# Patient Record
Sex: MALE | Race: BLACK OR AFRICAN AMERICAN | Employment: OTHER | URBAN - METROPOLITAN AREA
[De-identification: names, ages, dates, MRNs, and addresses within clinical notes are randomized per-mention and may not be internally consistent; named-entity substitution may affect disease eponyms.]

---

## 2019-03-20 ENCOUNTER — APPOINTMENT (OUTPATIENT)
Dept: CT IMAGING | Age: 79
End: 2019-03-20
Payer: MEDICARE

## 2019-03-20 ENCOUNTER — HOSPITAL ENCOUNTER (EMERGENCY)
Age: 79
Discharge: HOME | End: 2019-03-20
Attending: EMERGENCY MEDICINE
Payer: MEDICARE

## 2019-03-20 ENCOUNTER — APPOINTMENT (OUTPATIENT)
Dept: GENERAL RADIOLOGY | Age: 79
End: 2019-03-20
Payer: MEDICARE

## 2019-03-20 VITALS
HEART RATE: 72 BPM | SYSTOLIC BLOOD PRESSURE: 142 MMHG | OXYGEN SATURATION: 97 % | WEIGHT: 169 LBS | BODY MASS INDEX: 21.69 KG/M2 | RESPIRATION RATE: 18 BRPM | HEIGHT: 74 IN | DIASTOLIC BLOOD PRESSURE: 88 MMHG | TEMPERATURE: 98.1 F

## 2019-03-20 DIAGNOSIS — I95.1 ORTHOSTATIC HYPOTENSION: ICD-10-CM

## 2019-03-20 DIAGNOSIS — R42 DIZZINESS: Primary | ICD-10-CM

## 2019-03-20 DIAGNOSIS — R91.8 LUNG MASS: ICD-10-CM

## 2019-03-20 LAB
ALBUMIN SERPL-MCNC: 4 G/DL (ref 3.5–5.2)
ALP BLD-CCNC: 82 U/L (ref 40–129)
ALT SERPL-CCNC: 11 U/L (ref 0–40)
ANION GAP SERPL CALCULATED.3IONS-SCNC: 9 MMOL/L (ref 7–16)
AST SERPL-CCNC: 30 U/L (ref 0–39)
BILIRUB SERPL-MCNC: 0.5 MG/DL (ref 0–1.2)
BUN BLDV-MCNC: 18 MG/DL (ref 8–23)
CALCIUM SERPL-MCNC: 9 MG/DL (ref 8.6–10.2)
CHLORIDE BLD-SCNC: 101 MMOL/L (ref 98–107)
CO2: 29 MMOL/L (ref 22–29)
CREAT SERPL-MCNC: 1 MG/DL (ref 0.7–1.2)
D DIMER: 441 NG/ML DDU
GFR AFRICAN AMERICAN: >60
GFR NON-AFRICAN AMERICAN: >60 ML/MIN/1.73
GLUCOSE BLD-MCNC: 150 MG/DL (ref 74–99)
HCT VFR BLD CALC: 43.9 % (ref 37–54)
HEMOGLOBIN: 13.8 G/DL (ref 12.5–16.5)
MCH RBC QN AUTO: 29.5 PG (ref 26–35)
MCHC RBC AUTO-ENTMCNC: 31.4 % (ref 32–34.5)
MCV RBC AUTO: 93.8 FL (ref 80–99.9)
PDW BLD-RTO: 17.6 FL (ref 11.5–15)
PLATELET # BLD: 175 E9/L (ref 130–450)
PMV BLD AUTO: 11 FL (ref 7–12)
POTASSIUM SERPL-SCNC: 3.8 MMOL/L (ref 3.5–5)
RBC # BLD: 4.68 E12/L (ref 3.8–5.8)
SODIUM BLD-SCNC: 139 MMOL/L (ref 132–146)
TOTAL PROTEIN: 7.6 G/DL (ref 6.4–8.3)
TROPONIN: <0.01 NG/ML (ref 0–0.03)
WBC # BLD: 5.6 E9/L (ref 4.5–11.5)

## 2019-03-20 PROCEDURE — 2580000003 HC RX 258: Performed by: RADIOLOGY

## 2019-03-20 PROCEDURE — 71046 X-RAY EXAM CHEST 2 VIEWS: CPT

## 2019-03-20 PROCEDURE — 80053 COMPREHEN METABOLIC PANEL: CPT

## 2019-03-20 PROCEDURE — 36415 COLL VENOUS BLD VENIPUNCTURE: CPT

## 2019-03-20 PROCEDURE — 2580000003 HC RX 258: Performed by: EMERGENCY MEDICINE

## 2019-03-20 PROCEDURE — 99284 EMERGENCY DEPT VISIT MOD MDM: CPT

## 2019-03-20 PROCEDURE — 6360000004 HC RX CONTRAST MEDICATION: Performed by: RADIOLOGY

## 2019-03-20 PROCEDURE — 85378 FIBRIN DEGRADE SEMIQUANT: CPT

## 2019-03-20 PROCEDURE — 84484 ASSAY OF TROPONIN QUANT: CPT

## 2019-03-20 PROCEDURE — 85027 COMPLETE CBC AUTOMATED: CPT

## 2019-03-20 PROCEDURE — 71275 CT ANGIOGRAPHY CHEST: CPT

## 2019-03-20 RX ORDER — SODIUM CHLORIDE 0.9 % (FLUSH) 0.9 %
10 SYRINGE (ML) INJECTION ONCE
Status: COMPLETED | OUTPATIENT
Start: 2019-03-20 | End: 2019-03-20

## 2019-03-20 RX ORDER — AMLODIPINE BESYLATE 5 MG/1
5 TABLET ORAL DAILY
Refills: 0 | COMMUNITY
Start: 2019-02-21

## 2019-03-20 RX ORDER — 0.9 % SODIUM CHLORIDE 0.9 %
1000 INTRAVENOUS SOLUTION INTRAVENOUS ONCE
Status: COMPLETED | OUTPATIENT
Start: 2019-03-20 | End: 2019-03-20

## 2019-03-20 RX ADMIN — SODIUM CHLORIDE 1000 ML: 9 INJECTION, SOLUTION INTRAVENOUS at 12:25

## 2019-03-20 RX ADMIN — IOPAMIDOL 60 ML: 755 INJECTION, SOLUTION INTRAVENOUS at 11:24

## 2019-03-20 RX ADMIN — Medication 10 ML: at 11:24

## 2019-03-26 PROBLEM — R91.8 MASS OF LINGULA OF LUNG: Status: ACTIVE | Noted: 2019-03-26

## 2019-03-26 PROBLEM — J44.9 COPD, GROUP B, BY GOLD 2017 CLASSIFICATION (HCC): Status: ACTIVE | Noted: 2019-03-26

## 2019-03-26 LAB
EKG ATRIAL RATE: 85 BPM
EKG P AXIS: 70 DEGREES
EKG P-R INTERVAL: 168 MS
EKG Q-T INTERVAL: 354 MS
EKG QRS DURATION: 70 MS
EKG QTC CALCULATION (BAZETT): 421 MS
EKG R AXIS: 79 DEGREES
EKG T AXIS: 85 DEGREES
EKG VENTRICULAR RATE: 85 BPM

## 2019-04-01 RX ORDER — SODIUM CHLORIDE 0.9 % (FLUSH) 0.9 %
10 SYRINGE (ML) INJECTION PRN
Status: CANCELLED | OUTPATIENT
Start: 2019-04-01

## 2019-04-04 ENCOUNTER — HOSPITAL ENCOUNTER (OUTPATIENT)
Dept: GENERAL RADIOLOGY | Age: 79
Discharge: HOME | End: 2019-04-06
Payer: MEDICARE

## 2019-04-04 ENCOUNTER — HOSPITAL ENCOUNTER (OUTPATIENT)
Dept: CT IMAGING | Age: 79
Setting detail: OBSERVATION
Discharge: HOME | End: 2019-04-06
Attending: INTERNAL MEDICINE | Admitting: INTERNAL MEDICINE
Payer: MEDICARE

## 2019-04-04 DIAGNOSIS — R91.8 MASS OF LINGULA OF LUNG: ICD-10-CM

## 2019-04-04 DIAGNOSIS — Z01.812 ENCOUNTER FOR PREPROCEDURAL LABORATORY EXAMINATION: ICD-10-CM

## 2019-04-04 DIAGNOSIS — J94.2 HEMOTHORAX ON LEFT: ICD-10-CM

## 2019-04-04 PROBLEM — I10 HTN (HYPERTENSION): Status: ACTIVE | Noted: 2019-04-04

## 2019-04-04 LAB
ALBUMIN SERPL-MCNC: 4.1 G/DL (ref 3.5–5.2)
ALP BLD-CCNC: 89 U/L (ref 40–129)
ALT SERPL-CCNC: 16 U/L (ref 0–40)
ANION GAP SERPL CALCULATED.3IONS-SCNC: 9 MMOL/L (ref 7–16)
APTT: 37.1 SEC (ref 24.5–35.1)
AST SERPL-CCNC: 37 U/L (ref 0–39)
BILIRUB SERPL-MCNC: 0.3 MG/DL (ref 0–1.2)
BUN BLDV-MCNC: 15 MG/DL (ref 8–23)
CALCIUM SERPL-MCNC: 9.1 MG/DL (ref 8.6–10.2)
CHLORIDE BLD-SCNC: 98 MMOL/L (ref 98–107)
CO2: 30 MMOL/L (ref 22–29)
CREAT SERPL-MCNC: 1 MG/DL (ref 0.7–1.2)
GFR AFRICAN AMERICAN: >60
GFR NON-AFRICAN AMERICAN: >60 ML/MIN/1.73
GLUCOSE BLD-MCNC: 113 MG/DL (ref 74–99)
INR BLD: 1.1
PLATELET # BLD: 176 E9/L (ref 130–450)
POTASSIUM SERPL-SCNC: 4.1 MMOL/L (ref 3.5–5)
PROTHROMBIN TIME: 12 SEC (ref 9.3–12.4)
SODIUM BLD-SCNC: 137 MMOL/L (ref 132–146)
TOTAL PROTEIN: 8.2 G/DL (ref 6.4–8.3)

## 2019-04-04 PROCEDURE — 85610 PROTHROMBIN TIME: CPT

## 2019-04-04 PROCEDURE — 7100000010 HC PHASE II RECOVERY - FIRST 15 MIN

## 2019-04-04 PROCEDURE — 36415 COLL VENOUS BLD VENIPUNCTURE: CPT

## 2019-04-04 PROCEDURE — 32405 CT NEEDLE BIOPSY LUNG PERCUTANEOUS: CPT

## 2019-04-04 PROCEDURE — 88341 IMHCHEM/IMCYTCHM EA ADD ANTB: CPT

## 2019-04-04 PROCEDURE — 88305 TISSUE EXAM BY PATHOLOGIST: CPT

## 2019-04-04 PROCEDURE — 88342 IMHCHEM/IMCYTCHM 1ST ANTB: CPT

## 2019-04-04 PROCEDURE — G0378 HOSPITAL OBSERVATION PER HR: HCPCS

## 2019-04-04 PROCEDURE — 71045 X-RAY EXAM CHEST 1 VIEW: CPT

## 2019-04-04 PROCEDURE — 85730 THROMBOPLASTIN TIME PARTIAL: CPT

## 2019-04-04 PROCEDURE — 2580000003 HC RX 258: Performed by: INTERNAL MEDICINE

## 2019-04-04 PROCEDURE — 85049 AUTOMATED PLATELET COUNT: CPT

## 2019-04-04 PROCEDURE — 77012 CT SCAN FOR NEEDLE BIOPSY: CPT

## 2019-04-04 PROCEDURE — 6360000002 HC RX W HCPCS: Performed by: RADIOLOGY

## 2019-04-04 PROCEDURE — 80053 COMPREHEN METABOLIC PANEL: CPT

## 2019-04-04 PROCEDURE — 7100000011 HC PHASE II RECOVERY - ADDTL 15 MIN

## 2019-04-04 RX ORDER — FENTANYL CITRATE 50 UG/ML
50 INJECTION, SOLUTION INTRAMUSCULAR; INTRAVENOUS ONCE
Status: COMPLETED | OUTPATIENT
Start: 2019-04-04 | End: 2019-04-04

## 2019-04-04 RX ORDER — ONDANSETRON 2 MG/ML
4 INJECTION INTRAMUSCULAR; INTRAVENOUS EVERY 6 HOURS PRN
Status: DISCONTINUED | OUTPATIENT
Start: 2019-04-04 | End: 2019-04-06 | Stop reason: HOSPADM

## 2019-04-04 RX ORDER — MIDAZOLAM HYDROCHLORIDE 1 MG/ML
1 INJECTION INTRAMUSCULAR; INTRAVENOUS ONCE
Status: COMPLETED | OUTPATIENT
Start: 2019-04-04 | End: 2019-04-04

## 2019-04-04 RX ORDER — SODIUM CHLORIDE 0.9 % (FLUSH) 0.9 %
10 SYRINGE (ML) INJECTION EVERY 12 HOURS SCHEDULED
Status: DISCONTINUED | OUTPATIENT
Start: 2019-04-04 | End: 2019-04-06 | Stop reason: HOSPADM

## 2019-04-04 RX ORDER — SODIUM CHLORIDE 9 MG/ML
INJECTION, SOLUTION INTRAVENOUS CONTINUOUS
Status: DISCONTINUED | OUTPATIENT
Start: 2019-04-04 | End: 2019-04-05

## 2019-04-04 RX ORDER — AMLODIPINE BESYLATE 5 MG/1
5 TABLET ORAL DAILY
Status: DISCONTINUED | OUTPATIENT
Start: 2019-04-05 | End: 2019-04-06 | Stop reason: HOSPADM

## 2019-04-04 RX ORDER — SODIUM CHLORIDE 0.9 % (FLUSH) 0.9 %
10 SYRINGE (ML) INJECTION PRN
Status: DISCONTINUED | OUTPATIENT
Start: 2019-04-04 | End: 2019-04-06 | Stop reason: HOSPADM

## 2019-04-04 RX ORDER — ACETAMINOPHEN 325 MG/1
650 TABLET ORAL EVERY 4 HOURS PRN
Status: DISCONTINUED | OUTPATIENT
Start: 2019-04-04 | End: 2019-04-06 | Stop reason: HOSPADM

## 2019-04-04 RX ADMIN — Medication 10 ML: at 21:27

## 2019-04-04 RX ADMIN — FENTANYL CITRATE 50 MCG: 50 INJECTION INTRAMUSCULAR; INTRAVENOUS at 12:56

## 2019-04-04 RX ADMIN — MIDAZOLAM HYDROCHLORIDE 1 MG: 1 INJECTION, SOLUTION INTRAMUSCULAR; INTRAVENOUS at 12:56

## 2019-04-04 RX ADMIN — SODIUM CHLORIDE: 9 INJECTION, SOLUTION INTRAVENOUS at 21:27

## 2019-04-04 ASSESSMENT — PAIN SCALES - GENERAL
PAINLEVEL_OUTOF10: 0
PAINLEVEL_OUTOF10: 4
PAINLEVEL_OUTOF10: 0

## 2019-04-04 NOTE — BRIEF OP NOTE
Brief Postoperative Note    Janette Torres  YOB: 1940  [id number]    Pre-operative Diagnosis: Left lung nodule    Post-operative Diagnosis: Same    Procedure: Left lung nodule biopsy    Anesthesia: Moderate Sedation    Surgeons/Assistants: Elio    Estimated Blood Loss: less than 187 mL    Complications: None    Specimens: Was Obtained: Cores    Findings: See PACS    Electronically signed by Trena Otero MD on 4/4/2019 at 1:54 PM

## 2019-04-04 NOTE — PROGRESS NOTES
1030 Patient here for left lung biopsy. Patient identified chart reviewed allergies verified. VSS. Blood work taken, sent to lab and IV placed. 46 Dr. Mariama Bernal in to speak to patient regarding procedure, risks and benefits and a physical assessment was completed  1150 Dr. Abraham Skinner called and speaking to patient via telephone per Dr. Cheko Goldberg request.  2306+ Consent signed by patient. Patient positioned supine on Cat Scan table @ 1250  with O2 and monitoring devices attached. Patient scanned and images reviewed by Dr Mariama Bernal . Time out called @ 1315. Patient prepped and draped. With the guidance of Cat Scan, needle inserted left lung and  biopsy x3 taken by . Patient coughing up bright red blood. Patient suctioned. Mask applied. Patient re-scanned and images reviewed. Patient assisted off ct table. .  Puncture site cleansed and dry dressing applied. Procedure completed @4405  Patient transported to recovery. Report given to RP RN  Biopsy sample taken to laboratory. 1410 Report received from RP RN. CXR completed  1600 2nd CXR completed and reviewed by Dr. Mariama Bernal spoke to Dr. Abraham Skinner. Decision to admit patient made. Spoke with Dr. Steffan Eisenmenger, he will admit patient under observation  Report called to [facility] on 8WE.  Transport requested
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
## 2019-04-04 NOTE — PRE SEDATION
(Versed) intravenously and fentanyl intravenously    Patient is an appropriate candidate for plan of sedation: yes    Electronically signed by Jocelyn Farris MD on 4/4/2019 at 11:36 AM

## 2019-04-04 NOTE — PROGRESS NOTES
1340 Received pt via cart to angio recovery room. O2 per non rebreather in use  1350 Dr Linda Kevin visits  [id number] 1914 O2 per simple mask.  Pt with moist non productive cough

## 2019-04-04 NOTE — POST SEDATION
Sedation Post Procedure Note    Patient Name: Conner Herr   YOB: 1940  Room/Bed: Room/bed info not found  Medical Record Number: [id number]  Date: 4/4/2019   Time: 2:04 PM         Physicians/Assistants: Jocelyn Farris MD    Procedure Performed:  Left lung nodule biopsy    Post-Sedation Vital Signs:  Vitals:    04/04/19 1355   BP: (!) 154/73   Pulse: 67   Resp: 22   Temp:    SpO2: 97%               Post-Sedation Exam: Hemoptysis. Otherwise stable. Complications: None. Pt. denies SOB and chest pain.      Electronically signed by Jocelyn Farris MD on 4/4/2019 at 2:04 PM

## 2019-04-05 ENCOUNTER — APPOINTMENT (OUTPATIENT)
Dept: GENERAL RADIOLOGY | Age: 79
End: 2019-04-05
Attending: INTERNAL MEDICINE
Payer: MEDICARE

## 2019-04-05 LAB
ANION GAP SERPL CALCULATED.3IONS-SCNC: 8 MMOL/L (ref 7–16)
BUN BLDV-MCNC: 19 MG/DL (ref 8–23)
CALCIUM SERPL-MCNC: 8.7 MG/DL (ref 8.6–10.2)
CHLORIDE BLD-SCNC: 101 MMOL/L (ref 98–107)
CO2: 28 MMOL/L (ref 22–29)
CREAT SERPL-MCNC: 1 MG/DL (ref 0.7–1.2)
GFR AFRICAN AMERICAN: >60
GFR NON-AFRICAN AMERICAN: >60 ML/MIN/1.73
GLUCOSE BLD-MCNC: 102 MG/DL (ref 74–99)
HCT VFR BLD CALC: 39.3 % (ref 37–54)
HEMOGLOBIN: 12.7 G/DL (ref 12.5–16.5)
MCH RBC QN AUTO: 30.3 PG (ref 26–35)
MCHC RBC AUTO-ENTMCNC: 32.3 % (ref 32–34.5)
MCV RBC AUTO: 93.8 FL (ref 80–99.9)
PDW BLD-RTO: 17.2 FL (ref 11.5–15)
PLATELET # BLD: 153 E9/L (ref 130–450)
PMV BLD AUTO: 10.8 FL (ref 7–12)
POTASSIUM REFLEX MAGNESIUM: 4.4 MMOL/L (ref 3.5–5)
RBC # BLD: 4.19 E12/L (ref 3.8–5.8)
SODIUM BLD-SCNC: 137 MMOL/L (ref 132–146)
WBC # BLD: 8.9 E9/L (ref 4.5–11.5)

## 2019-04-05 PROCEDURE — 96372 THER/PROPH/DIAG INJ SC/IM: CPT

## 2019-04-05 PROCEDURE — 80048 BASIC METABOLIC PNL TOTAL CA: CPT

## 2019-04-05 PROCEDURE — 85027 COMPLETE CBC AUTOMATED: CPT

## 2019-04-05 PROCEDURE — 2580000003 HC RX 258: Performed by: INTERNAL MEDICINE

## 2019-04-05 PROCEDURE — 71045 X-RAY EXAM CHEST 1 VIEW: CPT

## 2019-04-05 PROCEDURE — 36415 COLL VENOUS BLD VENIPUNCTURE: CPT

## 2019-04-05 PROCEDURE — G0378 HOSPITAL OBSERVATION PER HR: HCPCS

## 2019-04-05 PROCEDURE — 6360000002 HC RX W HCPCS: Performed by: INTERNAL MEDICINE

## 2019-04-05 RX ADMIN — SODIUM CHLORIDE: 9 INJECTION, SOLUTION INTRAVENOUS at 13:46

## 2019-04-05 RX ADMIN — ENOXAPARIN SODIUM 40 MG: 40 INJECTION, SOLUTION INTRAVENOUS; SUBCUTANEOUS at 12:43

## 2019-04-05 RX ADMIN — Medication 10 ML: at 21:09

## 2019-04-05 RX ADMIN — Medication 10 ML: at 12:43

## 2019-04-05 ASSESSMENT — PAIN SCALES - GENERAL: PAINLEVEL_OUTOF10: 0

## 2019-04-05 NOTE — PLAN OF CARE
Patient knows to ask for help when getting up to restroom and to keep hospital socks on. Will continue to monitor patient's skin integrity. Problem: Risk for Impaired Skin Integrity  Goal: Tissue integrity - skin and mucous membranes  Description  Structural intactness and normal physiological function of skin and  mucous membranes.   4/5/2019 1325 by Edu Yang RN  Outcome: Met This Shift     Problem: Falls - Risk of:  Goal: Will remain free from falls  Description  Will remain free from falls  4/5/2019 1325 by Edu Yang RN  Outcome: Met This Shift     Problem: Falls - Risk of:  Goal: Absence of physical injury  Description  Absence of physical injury  4/5/2019 1325 by Edu Yang RN  Outcome: Met This Shift

## 2019-04-05 NOTE — PROGRESS NOTES
Patient is willing to have home oxygen now after speaking with him numerous times today and social work spoke with him as well.

## 2019-04-05 NOTE — PROGRESS NOTES
Follow up from IR regarding lung biopsy completed on 4/4. Per chart, patient is still on 4 L nasal cannula. Pulm consulted and needs to see. Spoke with nurse today, no apparent issues relayed. CXR ordered and needs to be done. Please call ext [phone] with any questions.

## 2019-04-05 NOTE — H&P
cyanosis or edema  Pulses: 2+ and symmetric  Skin: Skin color, texture, turgor normal. No rashes or lesions  Neurologic: Grossly normal    Results      Component Value Units   XR CHEST PORTABLE [[id number]] Resulted: 19 3467   Updated: 19 0907    Narrative:     Patient MRN:  [id number]  : 1940  Age: [de-identified] years  Gender: Male    Order Date:  2019 7:45 AM    Exam: XR CHEST PORTABLE    Number of views: Portable upright AP view of the chest, 1 image(s)    Indication: Status post biopsy of left lingular lung mass. Comparison: Radiographs dated 2019 and 3/20/2019. Procedural CT  images dated 2019. Chest CT dated 3/20/2019. FINDINGS:    There is improving interstitial and airspace opacification in the  lingular region of the left lung, compatible with postbiopsy  hemorrhage, as seen on the postbiopsy CT images from 2019 and  subsequently followed radiographically the same day. The relatively  dense residual focal opacity in this region appears to be due to the  underlying lung mass itself, which can be appreciated by comparison  with the chest radiographs of 3/20/2019 and coronal CT reconstructions  of 3/20/2019. There is no apparent pneumothorax or radiographically  significant hemothorax. No pleural effusion is seen. The cardiac  silhouette is unremarkable. Impression:       Improving postbiopsy opacities in the left lung. Left lingular lung mass. No pneumothorax. Basic Metabolic Panel w/ Reflex to MG [[id number]] (Abnormal) Collected: 19 0649   Updated: 19 0836     Sodium 137 mmol/L    Potassium reflex Magnesium 4.4 mmol/L    Chloride 101 mmol/L    CO2 28 mmol/L    Anion Gap 8 mmol/L    Glucose 102High  mg/dL    BUN 19 mg/dL    CREATININE 1.0 mg/dL    GFR Non-African American >60 mL/min/1.73    Comment: Chronic Kidney Disease: less than 60 ml/min/1.73 sq. m.         Kidney Failure: less than 15 ml/min/1.73 sq.m. Results valid for patients 18 years and older. GFR African American >60    Calcium 8.7 mg/dL   CBC [[id number]] (Abnormal) Collected: 19 0649   Updated: 1912     WBC 8.9 E9/L    RBC 4.19 E12/L    Hemoglobin 12.7 g/dL    Hematocrit 39.3 %    MCV 93.8 fL    MCH 30.3 pg    MCHC 32.3 %    RDW 17.2High  fL    Platelets 312 O9/T    MPV 10.8 fL   Surgical Pathology [[id number]] Resulted: 19 0807   Updated: 19    XR CHEST PORTABLE [[id number]] Resulted: 19 1619   Updated: 19 1621    Narrative:     Patient MRN:  [id number]  : 1940  Age: [de-identified] years  Gender: Male    Order Date:  2019 4:00 PM    Exam: XR CHEST PORTABLE    Number of views: Portable upright AP view of the chest, 1 image(s)    Indication: Post left lung nodule biopsy. Comparison: Chest radiograph dated 2019 1406 hours. FINDINGS:    Seen again is increased density in the lingula, compatible with  postbiopsy hemorrhage and the presence of a pulmonary nodule, without  significant change in appearance from the prior study. No pneumothorax  is seen. There is no pleural effusion. The cardiomediastinal  silhouette is unremarkable. Impression:       Stable lingular density, compatible with postbiopsy hemorrhage and  underlying lung nodule. No pneumothorax. Findings discussed with Dr. Gloria Nova at approximately 33 64 74 hours on  2019. CT NEEDLE BIOPSY LUNG PERCUTANEOUS [[id number]] Resulted: 19 1517   Updated: 19 151    Narrative:     Patient MRN:  [id number]  : 1940  Age: [de-identified] years  Gender: Male    Order Date:  2019 10:43 AM    Exam: CT GUIDED NEEDLE PLACEMENT, CT NEEDLE BIOPSY LUNG PERCUTANEOUS    Procedure: CT-guided core biopsy of the left lung nodule    Number of Images:  109    Indication: R91.8 Mass of lingula of lung   left lung mass     Comparison: CT study dated 3/20/2019. Anesthesia: Moderate sedation. Medications: Midazolam 1 mg IV, fentanyl 50 mcg IV. Lidocaine (2%)  subcutaneous.     Contrast: None.    Intraprocedural time: 30 minutes    PROCEDURE DETAILS AND FINDINGS:   Informed consent for the procedure was obtained from the patient. The  details of the procedure and its risks, benefits, and alternatives  were explained. These risks include, but are not limited to,  pneumothorax (possibly requiring chest tube insertion), bleeding,  hemoptysis, and infection. The patient was given the opportunity to  ask questions, which were answered, and indicated understanding. The  consent form was signed and witnessed. The procedure was performed under moderate sedation. The patient's  vital signs were visually displayed, and the patient was continuously  monitored throughout the procedure. Axial CT images of the chest  were obtained, again demonstrating a  spiculated nodule in the lingula of the left lung. Based on the CT  images, a biopsy trajectory was planned, and the appropriate site was  marked on the skin. The left chest  was sterilely prepped and draped. A preprocedure  timeout was performed. The intended percutaneous entry site was  locally anesthetized with subcutaneously administered 2% lidocaine,  and a small incision was made in the skin. Under CT guidance, a  17-gauge coaxial needle guide was advanced into the lung nodule. Core  biopsy of the lesion was performed through the coaxial needle guide  with a VIOSOince 18-gauge biopsy needle. The core specimens were  immediately placed in formalin solution for submission to the  pathology lab. To ensure hemostasis, Gelfoam mixed with sterile normal  saline was injected through the coaxial needle guide as the needle  guide was removed. A sterile, occlusive dressing was applied. A  postprocedure CT scan of the chest was performed, demonstrating  postbiopsy changes, including lingular airspace opacification  secondary to hemorrhage, and no pneumothorax. Impression:       CT-guided core biopsy of a left lung nodule, as described.    CT GUIDED NEEDLE 18-gauge biopsy needle. The core specimens were  immediately placed in formalin solution for submission to the  pathology lab. To ensure hemostasis, Gelfoam mixed with sterile normal  saline was injected through the coaxial needle guide as the needle  guide was removed. A sterile, occlusive dressing was applied. A  postprocedure CT scan of the chest was performed, demonstrating  postbiopsy changes, including lingular airspace opacification  secondary to hemorrhage, and no pneumothorax. Impression:       CT-guided core biopsy of a left lung nodule, as described. XR CHEST PORTABLE [[id number]] Resulted: 19 1436   Updated: 19    Narrative:     Patient MRN:  [id number]  : 1940  Age: [de-identified] years  Gender: Male    Order Date:  2019 1:45 PM    Exam: XR CHEST PORTABLE    Number of views: Portable upright AP view of the chest, 1 image(s)    Indication: Post lung biopsy. Comparison: Procedural CT images dated 2019. Chest radiographs  dated 3/20/2019. FINDINGS:    The cardiomediastinal silhouette is unremarkable. There is increased lingular density, compatible with pulmonary  hemorrhage, as seen on the postbiopsy CT images, and the presence of a  pulmonary nodule seen on prior studies. No pleural effusion or  pneumothorax is seen. Impression:       Lingular density, compatible with postbiopsy pulmonary hemorrhage. No  pneumothorax. Platelet count [[id number]] Collected: 19   Updated: 19 1201     Platelets 346 E7/M   Comprehensive Metabolic Panel [[id number]] (Abnormal) Collected: 19 104   Updated: 19 1128    Specimen Source: Blood     Sodium 137 mmol/L    Potassium 4.1 mmol/L    Comment: Specimen is moderately Hemolyzed. Result may be artificially increased.        Chloride 98 mmol/L    CO2 30High  mmol/L    Anion Gap 9 mmol/L    Glucose 113High  mg/dL    BUN 15 mg/dL    CREATININE 1.0 mg/dL    GFR Non-African American >60 mL/min/1.73    Comment: Chronic Kidney Disease: less than 60 ml/min/1.73 sq. m.         Kidney Failure: less than 15 ml/min/1.73 sq.m. Results valid for patients 18 years and older. GFR  >60    Calcium 9.1 mg/dL    Total Protein 8.2 g/dL    Alb 4.1 g/dL    Total Bilirubin 0.3 mg/dL    Alkaline Phosphatase 89 U/L    ALT 16 U/L    AST 37 U/L    Comment: Specimen is moderately Hemolyzed. Result may be artificially increased. APTT [[id number]] (Abnormal) Collected: 04/04/19 1045   Updated: 04/04/19 1116     aPTT 37.1High  sec   Protime-INR [[id number]] Collected: 04/04/19 1045   Updated: 04/04/19 1114     Protime 12.0 sec    INR 1.1           Problem list:    Patient Active Problem List   Diagnosis    Mass of lingula of lung    COPD, group B, by GOLD 2017 classification ([facility] [address]    Hemothorax on left    HTN (hypertension)         ASSESSMENT:      1. Traumatic left hemothorax secondary to lung biopsy with hypoxia    2. Mass of lingula of lung status post biopsy, path pending    3. COPD, group B, by Gold 2017 classification    4. Essential hypertension    5. History of prostate cancer 2009    PLAN:     1. Continue oxygen supplementation    2. Discussed with radiology, actually improving    3. Pulmonary consultation pending    4.  Check pulse oximetry on room air with ambulation to assess for home oxygen    Martina Hawkins D.O.  10:06 AM  4/5/2019

## 2019-04-05 NOTE — PROGRESS NOTES
Physical Therapy    Facility/Department: [facility] MED SURG ONC      NAME: Tonya Mcburney  : 1940  MRN: [id number]     PT order received. Evaluation attempted but pt refused adamantly. Will hold and check later.         Priscila Sorensen PT, DPT [id number]
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
## 2019-04-05 NOTE — CARE COORDINATION
Social work made aware patient needs home O2 per pulse ox testing. Will need DME order. Social work went to discuss with patient. Patient was agitated. Social work provided patient with DME list.  Patient not agreeable to O2 at home. He will call his sister to discuss. SW/MAMTA can follow up if patient agrees.   Electronically signed by WENDY Jacobson on 4/5/2019 at 3:59 PM

## 2019-04-05 NOTE — CARE COORDINATION
Social work went to meet with patient to discuss discharge planning and transition of care. Patient was agitated. Patient is independent. If needs Home O2, needs DME order and pulse ox testing.     Electronically signed by WENDY Ch on 4/5/2019 at 1:56 PM

## 2019-04-06 VITALS
TEMPERATURE: 98.5 F | DIASTOLIC BLOOD PRESSURE: 73 MMHG | OXYGEN SATURATION: 94 % | RESPIRATION RATE: 16 BRPM | SYSTOLIC BLOOD PRESSURE: 136 MMHG | HEART RATE: 86 BPM

## 2019-04-06 PROCEDURE — 6370000000 HC RX 637 (ALT 250 FOR IP): Performed by: INTERNAL MEDICINE

## 2019-04-06 PROCEDURE — 6360000002 HC RX W HCPCS: Performed by: INTERNAL MEDICINE

## 2019-04-06 PROCEDURE — 96372 THER/PROPH/DIAG INJ SC/IM: CPT

## 2019-04-06 PROCEDURE — G0378 HOSPITAL OBSERVATION PER HR: HCPCS

## 2019-04-06 PROCEDURE — 2700000000 HC OXYGEN THERAPY PER DAY

## 2019-04-06 PROCEDURE — 2580000003 HC RX 258: Performed by: INTERNAL MEDICINE

## 2019-04-06 RX ADMIN — Medication 10 ML: at 09:31

## 2019-04-06 RX ADMIN — AMLODIPINE BESYLATE 5 MG: 5 TABLET ORAL at 09:30

## 2019-04-06 RX ADMIN — ENOXAPARIN SODIUM 40 MG: 40 INJECTION, SOLUTION INTRAVENOUS; SUBCUTANEOUS at 09:30

## 2019-04-06 ASSESSMENT — PAIN SCALES - GENERAL
PAINLEVEL_OUTOF10: 0

## 2019-04-06 NOTE — PROGRESS NOTES
PULSE OX ON ROOM AIR SITTING 75%   PULSE OX ON ROOM AIR AMBULATING 80%  PULSE OX ON 4 LITERS SITTING RECOVERY 92%  PULSE OX ON 4 LITERS AMBULATING RECOVERY 93%

## 2019-04-06 NOTE — DISCHARGE SUMMARY
Physician Discharge Summary     Patient ID:  Conner Herr  [id number]  86 y.o.  1940    Admit date: 4/4/2019    Discharge date and time: 4/6/2019  6:56 PM     Admission Diagnoses: Hemothorax on left [J94.2]    Discharge Diagnoses:        1. Traumatic left hemothorax secondary to lung biopsy with hypoxia     2. Mass of lingula of lung status post biopsy, path pending     3. COPD, group B, by Gold 2017 classification     4. Essential hypertension     5. History of prostate cancer 2009        Consults: pulmonary/intensive care    Procedures:     Jocelyn Farris MD   Physician   Radiology   Brief Op Note   Signed   Date of Service:  4/4/2019  1:54 PM               Signed                 Show:Clear all  [x]Manual[x]Template[]Copied    Added by:  [x]Chong Suazo Ace, MD      []Moi for details      Brief Postoperative Note     Conner Herr  YOB: 1940  [id number]     Pre-operative Diagnosis: Left lung nodule     Post-operative Diagnosis: Same     Procedure: Left lung nodule biopsy     Anesthesia: Moderate Sedation     Surgeons/Assistants: Elio     Estimated Blood Loss: less than 639 mL     Complications: None     Specimens: Was Obtained: Cores     Findings: See PACS     Electronically signed by Jocelyn Farris MD on 4/4/2019 at 1:54 PM                      Laboratory:   Last 3 BMP  Recent Labs     04/04/19  1045 04/05/19  0649    137   K 4.1 4.4   CL 98 101   CO2 30* 28   BUN 15 19   CREATININE 1.0 1.0   GLUCOSE 113* 102*   CALCIUM 9.1 8.7       Last 3 CBC:  Recent Labs     04/04/19  1045 04/05/19  0649   WBC  --  8.9   RBC  --  4.19   HGB  --  12.7   HCT  --  39.3   MCV  --  93.8   MCH  --  30.3   MCHC  --  32.3   RDW  --  17.2*    153   MPV  --  10.8           Hospital Course: The patient is a [de-identified] y.o. male patient of Dr Mustapha Christian who presents with cough and shortness of breath after lung biopsy. He originally presented to the emergency room on March 20 with dizziness.  In the

## 2019-04-06 NOTE — PROGRESS NOTES
Upon assessment patient had nasal cannula off. Pt was satting low 70's. Pt stated that somebody took him off of oxygen. Pt education provided and patient placed on Nasal cannula. Pt returned to 92%. Pt maintained nasal cannula in place.

## 2019-04-06 NOTE — PROGRESS NOTES
Amira Gonzalez M.D.,[facility]  Azul Real D.O., F.A.C.O.I., Coco Ortega M.D. Gracy Boast, M.D., Chuck Beavers M.D. Daily Pulmonary Progress Note    Patient:  Alexia Dash [de-identified] y.o. male MRN: [id number]     Date of Service: 4/6/2019      Synopsis     We are following patient for post left lingula node biopsy and hypoxia    \"CC\" hemoptysis    Code status: Full code      Subjective      Patient was seen and examined. He is laying in bed in no acute distress. He has had no further hemoptysis this morning. Has slight discomfort at the site of his biopsy which is improving. He is currently on 4 L of oxygen saturating 94%. .      Review of Systems:  Constitutional: Denies fever, weight loss, night sweats, and fatigue  Skin: Denies pigmentation, dark lesions, and rashes   HEENT: Denies hearing loss, tinnitus, ear drainage, epistaxis, sore throat, and hoarseness. Cardiovascular: Denies palpitations, chest pain, and chest pressure. Respiratory: Denies cough, dyspnea at rest, hemoptysis, apnea, and choking.   Gastrointestinal: Denies nausea, vomiting, poor appetite, diarrhea, heartburn or reflux  Genitourinary: Denies dysuria, frequency, urgency or hematuria  Musculoskeletal: Denies myalgias, muscle weakness, and bone pain  Neurological: Denies dizziness, vertigo, headache, and focal weakness  Psychological: Denies anxiety and depression  Endocrine: Denies heat intolerance and cold intolerance  Hematopoietic/Lymphatic: Denies bleeding problems and blood transfusions    24-hour events:  No acute events overnight    Objective   Vitals: /73   Pulse 86   Temp 98.5 °F (36.9 °C) (Temporal)   Resp 16   SpO2 94%     I/O:    Intake/Output Summary (Last 24 hours) at 4/6/2019 1548  Last data filed at 4/6/2019 1421  Gross per 24 hour   Intake 905 ml   Output 550 ml   Net 355 ml       Vent Information  FiO2 : 15 %                Physical Exam:  General Appearance: appears comfortable in no acute distress. HEENT: Normocephalic atraumatic without obvious abnormality   Neck: Lips, mucosa, and tongue normal.  Supple, symmetrical, trachea midline, no adenopathy;thyroid:  no enlargement/tenderness/nodules or JVD. Lung: Breath sounds CTA. Respirations   unlabored. Symmetrical expansion. Heart: RRR, normal S1, S2. No MRG  Abdomen: Soft, NT, ND. BS present x 4 quadrants. No bruit or organomegaly. Extremities: Pedal pulses 2+ symmetric b/l. Extremities normal, no cyanosis, clubbing, or edema. Musculokeletal: No joint swelling, no muscle tenderness. ROM normal in all joints of extremities. Neurologic: Mental status: Alert and Oriented X3 . Pertinent/ New Labs and Imaging Studies     Imaging Personally Reviewed:        Labs:  Lab Results   Component Value Date    WBC 8.9 04/05/2019    HGB 12.7 04/05/2019    HCT 39.3 04/05/2019    MCV 93.8 04/05/2019    MCH 30.3 04/05/2019    MCHC 32.3 04/05/2019    RDW 17.2 04/05/2019     04/05/2019    MPV 10.8 04/05/2019     Lab Results   Component Value Date     04/05/2019    K 4.4 04/05/2019     04/05/2019    CO2 28 04/05/2019    BUN 19 04/05/2019    CREATININE 1.0 04/05/2019    LABALBU 4.1 04/04/2019    CALCIUM 8.7 04/05/2019    GFRAA >60 04/05/2019    LABGLOM >60 04/05/2019     Lab Results   Component Value Date    PROTIME 12.0 04/04/2019    INR 1.1 04/04/2019     No results for input(s): PROBNP in the last 72 hours. No results for input(s): PROCAL in the last 72 hours. This SmartLink has not been configured with any valid records. Micro:  No results for input(s): CULTRESP in the last 72 hours. No results for input(s): LABGRAM in the last 72 hours. No results for input(s): LEGUR in the last 72 hours. No results for input(s): STREPNEUMAGU in the last 72 hours. No results for input(s): LP1UAG in the last 72 hours. Assessment:    1. Mass of the left lingula lung status post biopsy 4/4/19  2.  Hemoptysis- resolved  3. COPD  4. Active tobacco abuse, 55-pack-year smoker  5. Unintentional weight loss  6. Previous occupational exposures, including asbestos  7. Hx prostate cancer treated with radiation therapy          Plan:   1. Discussed with our nursing staff and we repeated his ambulatory pulse oximetry. Patient will require 4 L of supplemental oxygen at rest and with ambulation. 2. He can be discharged from a pulmonary standpoint  3. Follow-up in office with Dr. Jeffrey Suazo for discussion of his biopsy results on 4/23/19.       This plan of care was reviewed in collaboration with    Electronically signed by Judd Núñez MD on 4/6/2019 at 3:48 PM

## 2019-04-06 NOTE — CARE COORDINATION
Social work made aware patient decided he would like O2 at d/c.  DME order and pulse ox test noted. Patient had list for DME from yesterday but unsure what agency. SW made referral to [facility] with [facility] ([phone]).   Electronically signed by WENDY Lopez on 4/6/2019 at 12:32 PM

## 2019-04-23 ENCOUNTER — HOSPITAL ENCOUNTER (OUTPATIENT)
Dept: NUCLEAR MEDICINE | Age: 79
Discharge: HOME | End: 2019-04-25
Payer: MEDICARE

## 2019-04-23 DIAGNOSIS — C34.92 MALIGNANT NEOPLASM OF LEFT LUNG, UNSPECIFIED PART OF LUNG (HCC): ICD-10-CM

## 2019-04-23 DIAGNOSIS — C34.12 MALIGNANT NEOPLASM OF UPPER LOBE, LEFT BRONCHUS OR LUNG (HCC): ICD-10-CM

## 2019-04-23 PROCEDURE — 78815 PET IMAGE W/CT SKULL-THIGH: CPT

## 2019-04-23 PROCEDURE — 3430000000 HC RX DIAGNOSTIC RADIOPHARMACEUTICAL: Performed by: RADIOLOGY

## 2019-04-23 PROCEDURE — A9552 F18 FDG: HCPCS | Performed by: RADIOLOGY

## 2019-04-23 RX ORDER — FLUDEOXYGLUCOSE F 18 200 MCI/ML
10 INJECTION, SOLUTION INTRAVENOUS
Status: COMPLETED | OUTPATIENT
Start: 2019-04-23 | End: 2019-04-23

## 2019-04-23 RX ADMIN — FLUDEOXYGLUCOSE F 18 10 MILLICURIE: 200 INJECTION, SOLUTION INTRAVENOUS at 09:34

## 2019-05-13 PROBLEM — C34.12 MALIGNANT NEOPLASM OF UPPER LOBE OF LEFT LUNG (HCC): Status: ACTIVE | Noted: 2019-05-13

## 2019-05-28 ENCOUNTER — INITIAL CONSULT (OUTPATIENT)
Dept: CARDIOTHORACIC SURGERY | Age: 79
End: 2019-05-28
Payer: MEDICARE

## 2019-05-28 VITALS
DIASTOLIC BLOOD PRESSURE: 86 MMHG | HEIGHT: 73 IN | BODY MASS INDEX: 19.88 KG/M2 | SYSTOLIC BLOOD PRESSURE: 143 MMHG | WEIGHT: 150 LBS | HEART RATE: 81 BPM

## 2019-05-28 DIAGNOSIS — C34.12 MALIGNANT NEOPLASM OF UPPER LOBE OF LEFT LUNG (HCC): Primary | ICD-10-CM

## 2019-05-28 PROCEDURE — 4004F PT TOBACCO SCREEN RCVD TLK: CPT | Performed by: THORACIC SURGERY (CARDIOTHORACIC VASCULAR SURGERY)

## 2019-05-28 PROCEDURE — 99204 OFFICE O/P NEW MOD 45 MIN: CPT | Performed by: THORACIC SURGERY (CARDIOTHORACIC VASCULAR SURGERY)

## 2019-05-28 PROCEDURE — G8427 DOCREV CUR MEDS BY ELIG CLIN: HCPCS | Performed by: THORACIC SURGERY (CARDIOTHORACIC VASCULAR SURGERY)

## 2019-05-28 PROCEDURE — G8420 CALC BMI NORM PARAMETERS: HCPCS | Performed by: THORACIC SURGERY (CARDIOTHORACIC VASCULAR SURGERY)

## 2019-05-28 PROCEDURE — 4040F PNEUMOC VAC/ADMIN/RCVD: CPT | Performed by: THORACIC SURGERY (CARDIOTHORACIC VASCULAR SURGERY)

## 2019-05-28 PROCEDURE — 1123F ACP DISCUSS/DSCN MKR DOCD: CPT | Performed by: THORACIC SURGERY (CARDIOTHORACIC VASCULAR SURGERY)

## 2019-05-28 NOTE — PROGRESS NOTES
Subjective:      Chief Complaint   Patient presents with    Consultation     referral Dr Antonia Schmidt lung mass       Patient ID: Damian East is a [de-identified] y.o. male who presents to office upon referral for continued evaluation and recommendations regarding his MICHELA lung CA. Patient states he had a routine CXR done which showed a suspected left lung mass, he then underwent CT can which showed a 3cm  Peripheral left lung mass. 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 PET scan on 4/23/19 showed increased activity in the MICHELA as well as left hilum  He does admit to intermittent cough which at times is productive of clear sputum. He denies SOB at rest but does admit to JACKSON with mild activity. He denies wheezing, fevers, chills, night sweats, hemoptysis, CP. The patient currently smokes one packs of cigarettes per day, and  denied having set a quit date. . 55 pkyr history. HPI    Review of Systems   Constitutional: Positive for unexpected weight change. Negative for chills and fever. Respiratory: Positive for cough and shortness of breath. Negative for wheezing and stridor. Cardiovascular: Negative for chest pain and palpitations. Objective:   Physical Exam   Constitutional: He is oriented to person, place, and time. He appears well-developed and well-nourished. Cardiovascular: Normal rate, regular rhythm and normal heart sounds. Pulmonary/Chest: Effort normal and breath sounds normal.   Abdominal: Soft. Bowel sounds are normal.   Musculoskeletal: Normal range of motion. Neurological: He is alert and oriented to person, place, and time. Assessment:      Lung CA MICHELA, positive hilum      Plan:      His lung function is quite poor and he states he gets SOB with mild to moderate activity. I do not feel that taking half his left lung with a positive hilum is in his best interest.  I feel he should be evaluated for SBRT and chemo.

## 2019-05-29 ASSESSMENT — ENCOUNTER SYMPTOMS
SHORTNESS OF BREATH: 1
COUGH: 1
WHEEZING: 0
STRIDOR: 0

## 2019-06-10 ENCOUNTER — HOSPITAL ENCOUNTER (OUTPATIENT)
Dept: RADIATION ONCOLOGY | Age: 79
Discharge: HOME | End: 2019-06-10
Payer: MEDICARE

## 2019-06-10 ENCOUNTER — TELEPHONE (OUTPATIENT)
Dept: CASE MANAGEMENT | Age: 79
End: 2019-06-10

## 2019-06-10 DIAGNOSIS — C34.92 MALIGNANT NEOPLASM OF LEFT LUNG, UNSPECIFIED PART OF LUNG (HCC): Primary | ICD-10-CM

## 2019-06-10 PROCEDURE — 99205 OFFICE O/P NEW HI 60 MIN: CPT

## 2019-06-10 PROCEDURE — 99205 OFFICE O/P NEW HI 60 MIN: CPT | Performed by: RADIOLOGY
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
## 2019-06-10 NOTE — PATIENT INSTRUCTIONS
LAYTON Parish.  Martir Nunes MD [address] [place] [address]  Radiation Oncology  [place]:  [phone]   FAX:    [phone] [facility] Street: [address] [place] [address] Road:  [phone]
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
## 2019-06-10 NOTE — TELEPHONE ENCOUNTER
Met with patient and sister during his  initial consultation with Dr. Osbaldo Agudelo  for his  recent Lung cancer diagnosis. Introduced myself and explained my role with patients receiving treatment at our center. Patient was friendly and receptive. He states he is doing ok. He denies any questions or needs at this time. He does have his sister to help with things and a couple close friends that help. He states he is on SSI and Disability and has limited income. Given information on Financial Navigator. Will forward to [facility]. Instructed in detail on his pathology findings including cancer type. Instructed on next steps including potential radiation treatments per Dr. Contreras Fan recommendations and follow up care. Provided with  literature Patient Resource Lung Cancer, Patient Point-Non-Small Cell Lung Cancer and Lung Cancer Nutrition Tips. Provided with my contact information and instructed patient to call me with questions or concerns. Verbalizes understanding. Patient appreciative of visit. Will continue to follow.

## 2019-06-11 ENCOUNTER — TELEPHONE (OUTPATIENT)
Dept: CASE MANAGEMENT | Age: 79
End: 2019-06-11

## 2019-06-17 ENCOUNTER — TELEPHONE (OUTPATIENT)
Dept: RADIATION ONCOLOGY | Age: 79
End: 2019-06-17

## 2019-06-17 NOTE — TELEPHONE ENCOUNTER
SW received return call from pt today who states that his greatest concern is his financial issues that he is having. Notes reviewed which indicate financial navigator did reach out to pt, but he was not receptive at that time. He states that he needs help with his hospital bills otherwise he will not be able to pay them. He states that he contacted the number for assistance but was told he would need to obtain a copy of his SS statement which he did have at one point, but gave it to [facility] when he was applying for assistance there. He would like to speak with navigator more to determine if there are other documents he can provide including bank statements. SW will forward request to financial navigator. Pt states that he has a close relationship with his sister who has been helping him as needed and he is anxious to start radiation treatment. SW discussed role and scope of practice and pt is receptive to speaking further as needed. He will retain SW contact information for follow up.  Howard Berg, CIERA, ELVIA-S  Oncology Social Worker

## 2019-06-19 ENCOUNTER — TELEPHONE (OUTPATIENT)
Dept: INFUSION THERAPY | Age: 79
End: 2019-06-19

## 2019-06-19 ENCOUNTER — HOSPITAL ENCOUNTER (OUTPATIENT)
Dept: RADIATION ONCOLOGY | Age: 79
Discharge: HOME | End: 2019-06-19
Attending: RADIOLOGY
Payer: MEDICARE

## 2019-06-19 ENCOUNTER — TELEPHONE (OUTPATIENT)
Dept: RADIATION ONCOLOGY | Age: 79
End: 2019-06-19

## 2019-06-19 PROCEDURE — 77334 RADIATION TREATMENT AID(S): CPT | Performed by: RADIOLOGY

## 2019-06-19 NOTE — TELEPHONE ENCOUNTER
Attempted to contact patient per referral, re: weight loss. Patient unavailable, voicemail left, encouraged return call as able.  Lenny Dandy, RD,,lD

## 2019-06-19 NOTE — TELEPHONE ENCOUNTER
Augusta Payton  6/19/2019  Wt Readings from Last 10 Encounters:   05/28/19 150 lb (68 kg)   05/13/19 150 lb (68 kg)   03/26/19 152 lb (68.9 kg)   03/20/19 169 lb (76.7 kg)   05/01/12 169 lb (76.7 kg)     Ht Readings from Last 1 Encounters:   05/28/19 6' 1\" (1.854 m)     There is no height or weight on file to calculate BMI. This clinician met with patient today prior to CT Sim. He is here with his sister. He reports he is doing fair. He has noted weight loss of about 15# in the past 3mo. He reports his hunger/appetite is not as strong and he can easily skip meals. He also does not cook for himself, so he relies on family to make extra food. He does do easy foods, but when he is not as hungry, this is not as important to him. He denies n/v/d/c. He is not drinking a protein supplement because it is too expensive. Spoke with patient about the goal for weight maintenance during radiation, and the need for his body to get the proper calories, protein and vitamins for strength and healing. Reviewed the importance of a supplement to take between meals to aid in this, and provided him with samples of Boost. Also reviewed cheaper options, such as whole milk, non-fat powdered milk and Enoree Instant breakfast. Reviewed options for easy meals such as pot-pies, canned soup, beans, etc. He was encouraged to make each meal a priority and be mindful of not skipping meals. He will be followed through treatment for compliance and need for further education. Contact information provided for any questions that may arise. Weight change: 15# loss in 3mo (9%)  Appetite: fair  N/V/D/C: none  Calculated Needs if applicable: 60-4120XITO (68x32), 88-95gm PRO (68x1.30), 22-2300ml (68x32)    Pre-Hab Eligible?: no        Recommendations: Add Ensure Enlive, boost Plus or Enoree Instant Breakfast BID between meals in supplement- provides ~480-700kcal, 20-40gm PRO in supplement;  Aim for routine meals and add protein through

## 2019-07-03 ENCOUNTER — HOSPITAL ENCOUNTER (OUTPATIENT)
Dept: RADIATION ONCOLOGY | Age: 79
Discharge: HOME | End: 2019-07-03
Attending: RADIOLOGY
Payer: MEDICARE

## 2019-07-03 PROCEDURE — 77301 RADIOTHERAPY DOSE PLAN IMRT: CPT | Performed by: RADIOLOGY

## 2019-07-03 PROCEDURE — 77300 RADIATION THERAPY DOSE PLAN: CPT | Performed by: RADIOLOGY

## 2019-07-03 PROCEDURE — 77293 RESPIRATOR MOTION MGMT SIMUL: CPT | Performed by: RADIOLOGY

## 2019-07-03 PROCEDURE — 77338 DESIGN MLC DEVICE FOR IMRT: CPT | Performed by: RADIOLOGY

## 2019-07-08 ENCOUNTER — APPOINTMENT (OUTPATIENT)
Dept: RADIATION ONCOLOGY | Age: 79
End: 2019-07-08
Attending: RADIOLOGY
Payer: MEDICARE

## 2019-07-08 ENCOUNTER — HOSPITAL ENCOUNTER (OUTPATIENT)
Dept: RADIATION ONCOLOGY | Age: 79
Discharge: HOME | End: 2019-07-08
Attending: RADIOLOGY
Payer: MEDICARE

## 2019-07-08 PROCEDURE — 77373 STRTCTC BDY RAD THER TX DLVR: CPT | Performed by: RADIOLOGY

## 2019-07-09 ENCOUNTER — APPOINTMENT (OUTPATIENT)
Dept: RADIATION ONCOLOGY | Age: 79
End: 2019-07-09
Attending: RADIOLOGY
Payer: MEDICARE

## 2019-07-10 ENCOUNTER — HOSPITAL ENCOUNTER (OUTPATIENT)
Dept: RADIATION ONCOLOGY | Age: 79
Discharge: HOME | End: 2019-07-10
Attending: RADIOLOGY
Payer: MEDICARE

## 2019-07-10 VITALS
HEART RATE: 81 BPM | BODY MASS INDEX: 19.39 KG/M2 | SYSTOLIC BLOOD PRESSURE: 128 MMHG | WEIGHT: 147 LBS | OXYGEN SATURATION: 91 % | DIASTOLIC BLOOD PRESSURE: 72 MMHG

## 2019-07-10 DIAGNOSIS — C34.90 MALIGNANT NEOPLASM OF LUNG, UNSPECIFIED LATERALITY, UNSPECIFIED PART OF LUNG (HCC): Primary | ICD-10-CM

## 2019-07-10 PROCEDURE — 77373 STRTCTC BDY RAD THER TX DLVR: CPT | Performed by: RADIOLOGY

## 2019-07-10 PROCEDURE — 99999 PR OFFICE/OUTPT VISIT,PROCEDURE ONLY: CPT | Performed by: RADIOLOGY

## 2019-07-10 NOTE — PROGRESS NOTES
Radiation Oncology          Mr.William LULY Payton underwent fractionated external beam radiation therapy using a SBRT / SABR technique. I was personally present for the treatment planning (+/- 4D CT, W/WO Ab compression) imaging and pt setup to ensure appropriate immobilization to meet current TATO standards. After a detailed review of the treatment plan and appropriate physics QA / oversight this pt was scheduled and underwent hypo fractionated treatment as noted per the D/I in [facility]. Typical fractionation schemes include but are not limited to 50 Gy in 5 fractions. The NCCN guidelines and pt workup including a detailed discussion of the risks, benefits and alternative were discussed previously [RTOG dose constraints met per plan- see Mosaiq]. The pt verbalized understanding for the risks of this procedure today prior to Tx. Today, after a dual identification time out (including a brief plan overview )- I personally reviewed the complex multifaceted immobilization apparatus W/WO compression +/- Synergy (PRN), patient position, and 4D imaging (if applicable) prior to the treatment delivery to ensure accuracy. The SBRT team, including myself, were all present for the set up CT scan and delivery of the entire fraction (the latter on a PRN basis). The patient successfully completed the procedure today in stable condition; this procedure was well tolerated today. Pancho Pruitt has now received 2000 cGy in 2/5 fractions directed to the left lung mass. Nicol Onofre.  Thalia Tsang MD [place] [address] Oncology  Cell: [phone]    [facility]:  [phone]   FAX: [phone]  [facility]:  [address] [place] [address] Avenue:    [phone]  [address] [place] [address] Road:  [address] [place] [address] Road:  [phone]

## 2019-07-11 ENCOUNTER — APPOINTMENT (OUTPATIENT)
Dept: RADIATION ONCOLOGY | Age: 79
End: 2019-07-11
Attending: RADIOLOGY
Payer: MEDICARE

## 2019-07-12 ENCOUNTER — HOSPITAL ENCOUNTER (OUTPATIENT)
Dept: RADIATION ONCOLOGY | Age: 79
Discharge: HOME | End: 2019-07-12
Attending: RADIOLOGY
Payer: MEDICARE

## 2019-07-12 ENCOUNTER — APPOINTMENT (OUTPATIENT)
Dept: RADIATION ONCOLOGY | Age: 79
End: 2019-07-12
Attending: RADIOLOGY
Payer: MEDICARE

## 2019-07-12 PROCEDURE — 77373 STRTCTC BDY RAD THER TX DLVR: CPT | Performed by: RADIOLOGY

## 2019-07-15 ENCOUNTER — HOSPITAL ENCOUNTER (OUTPATIENT)
Dept: RADIATION ONCOLOGY | Age: 79
Discharge: HOME | End: 2019-07-15
Attending: RADIOLOGY
Payer: MEDICARE

## 2019-07-15 PROCEDURE — 77373 STRTCTC BDY RAD THER TX DLVR: CPT | Performed by: RADIOLOGY

## 2019-07-17 ENCOUNTER — HOSPITAL ENCOUNTER (OUTPATIENT)
Dept: RADIATION ONCOLOGY | Age: 79
Discharge: HOME | End: 2019-07-17
Attending: RADIOLOGY
Payer: MEDICARE

## 2019-07-17 ENCOUNTER — TELEPHONE (OUTPATIENT)
Dept: CASE MANAGEMENT | Age: 79
End: 2019-07-17

## 2019-07-17 VITALS
WEIGHT: 147 LBS | BODY MASS INDEX: 19.39 KG/M2 | DIASTOLIC BLOOD PRESSURE: 68 MMHG | OXYGEN SATURATION: 95 % | SYSTOLIC BLOOD PRESSURE: 110 MMHG | HEART RATE: 72 BPM

## 2019-07-17 DIAGNOSIS — C34.10 MALIGNANT NEOPLASM OF UPPER LOBE OF LUNG, UNSPECIFIED LATERALITY (HCC): Primary | ICD-10-CM

## 2019-07-17 PROCEDURE — 77336 RADIATION PHYSICS CONSULT: CPT | Performed by: RADIOLOGY

## 2019-07-17 PROCEDURE — 77373 STRTCTC BDY RAD THER TX DLVR: CPT | Performed by: RADIOLOGY

## 2019-07-17 PROCEDURE — 99999 PR OFFICE/OUTPT VISIT,PROCEDURE ONLY: CPT | Performed by: RADIOLOGY

## 2019-07-17 NOTE — TELEPHONE ENCOUNTER
Met with patient prior to his daily radiation therapy treatment for follow up for radiation therapy completion. Patient has 1 more treatment to go today. Upon inquiring, states that he is doing well with the treatments but is tired. He will see Dr Cristofer Bella in an office visit today after treatment. He will also be given discharge instructions and a follow up appointment. Answered  questions regarding follow up schedule to his apparent satisfaction. Provided support and encouragement. Aware that he will receive a follow up appointment with Danielle Acosta NP or Dr. Cristofer Bella after completion today. Encouraged to call with any questions or concerns between office visits. Declines any current needs for assistance. Patient appreciative of visit. Will continue to follow.

## 2019-07-18 ENCOUNTER — APPOINTMENT (OUTPATIENT)
Dept: RADIATION ONCOLOGY | Age: 79
End: 2019-07-18
Attending: RADIOLOGY
Payer: MEDICARE

## 2019-07-19 ENCOUNTER — APPOINTMENT (OUTPATIENT)
Dept: RADIATION ONCOLOGY | Age: 79
End: 2019-07-19
Attending: RADIOLOGY
Payer: MEDICARE

## 2019-08-08 PROBLEM — I71.40 ABDOMINAL AORTIC ANEURYSM (AAA): Status: ACTIVE | Noted: 2019-08-08

## 2019-08-08 PROBLEM — C61 CA OF PROSTATE (HCC): Status: ACTIVE | Noted: 2019-08-08

## 2019-08-08 PROBLEM — N40.0 BENIGN PROSTATIC HYPERPLASIA: Status: ACTIVE | Noted: 2019-08-08

## 2019-08-15 ENCOUNTER — HOSPITAL ENCOUNTER (OUTPATIENT)
Dept: CT IMAGING | Age: 79
Discharge: HOME | End: 2019-08-17
Payer: MEDICARE

## 2019-08-15 DIAGNOSIS — C79.9 METASTATIC DISEASE (HCC): ICD-10-CM

## 2019-08-15 PROCEDURE — 70450 CT HEAD/BRAIN W/O DYE: CPT

## 2019-08-26 ENCOUNTER — HOSPITAL ENCOUNTER (OUTPATIENT)
Dept: RADIATION ONCOLOGY | Age: 79
Discharge: HOME | End: 2019-08-26
Attending: RADIOLOGY
Payer: MEDICARE

## 2019-08-26 VITALS
DIASTOLIC BLOOD PRESSURE: 60 MMHG | HEIGHT: 73 IN | HEART RATE: 86 BPM | BODY MASS INDEX: 19.12 KG/M2 | WEIGHT: 144.25 LBS | SYSTOLIC BLOOD PRESSURE: 92 MMHG | TEMPERATURE: 98.2 F

## 2019-08-26 DIAGNOSIS — C34.10 MALIGNANT NEOPLASM OF UPPER LOBE OF LUNG, UNSPECIFIED LATERALITY (HCC): Primary | ICD-10-CM

## 2019-08-26 PROCEDURE — 99999 PR OFFICE/OUTPT VISIT,PROCEDURE ONLY: CPT | Performed by: NURSE PRACTITIONER

## 2019-08-27 ENCOUNTER — TELEPHONE (OUTPATIENT)
Dept: RADIATION ONCOLOGY | Age: 79
End: 2019-08-27

## 2019-10-11 ENCOUNTER — HOSPITAL ENCOUNTER (OUTPATIENT)
Dept: CT IMAGING | Age: 79
Discharge: HOME | End: 2019-10-13
Payer: MEDICARE

## 2019-10-11 DIAGNOSIS — C34.92 MALIGNANT NEOPLASM OF LEFT LUNG, UNSPECIFIED PART OF LUNG (HCC): ICD-10-CM

## 2019-10-11 PROCEDURE — 71250 CT THORAX DX C-: CPT

## 2019-10-18 ENCOUNTER — HOSPITAL ENCOUNTER (OUTPATIENT)
Dept: CT IMAGING | Age: 79
Discharge: HOME | End: 2019-10-20
Payer: MEDICARE

## 2019-10-18 ENCOUNTER — HOSPITAL ENCOUNTER (OUTPATIENT)
Age: 79
Discharge: HOME | End: 2019-10-18
Payer: MEDICARE

## 2019-10-18 DIAGNOSIS — R31.0 HEMATURIA, GROSS: ICD-10-CM

## 2019-10-18 LAB
BUN BLDV-MCNC: 21 MG/DL (ref 8–23)
CREAT SERPL-MCNC: 1.2 MG/DL (ref 0.7–1.2)
GFR AFRICAN AMERICAN: >60
GFR NON-AFRICAN AMERICAN: >60 ML/MIN/1.73

## 2019-10-18 PROCEDURE — 36415 COLL VENOUS BLD VENIPUNCTURE: CPT

## 2019-10-18 PROCEDURE — 84520 ASSAY OF UREA NITROGEN: CPT

## 2019-10-18 PROCEDURE — 6360000004 HC RX CONTRAST MEDICATION: Performed by: RADIOLOGY

## 2019-10-18 PROCEDURE — 74177 CT ABD & PELVIS W/CONTRAST: CPT

## 2019-10-18 PROCEDURE — 2580000003 HC RX 258: Performed by: RADIOLOGY

## 2019-10-18 PROCEDURE — 82565 ASSAY OF CREATININE: CPT

## 2019-10-18 RX ORDER — SODIUM CHLORIDE 0.9 % (FLUSH) 0.9 %
10 SYRINGE (ML) INJECTION ONCE
Status: COMPLETED | OUTPATIENT
Start: 2019-10-18 | End: 2019-10-18

## 2019-10-18 RX ADMIN — Medication 10 ML: at 19:14

## 2019-10-18 RX ADMIN — IOPAMIDOL 110 ML: 755 INJECTION, SOLUTION INTRAVENOUS at 19:14

## 2019-11-11 ENCOUNTER — HOSPITAL ENCOUNTER (OUTPATIENT)
Age: 79
Discharge: HOME | End: 2019-11-13
Payer: MEDICARE

## 2019-11-11 LAB — PROSTATE SPECIFIC ANTIGEN: 0.92 NG/ML (ref 0–4)

## 2019-11-11 PROCEDURE — 84153 ASSAY OF PSA TOTAL: CPT

## 2019-11-29 ENCOUNTER — APPOINTMENT (OUTPATIENT)
Dept: RADIATION ONCOLOGY | Age: 79
End: 2019-11-29
Attending: RADIOLOGY
Payer: MEDICARE

## 2019-12-04 ENCOUNTER — TELEPHONE (OUTPATIENT)
Dept: RADIATION ONCOLOGY | Age: 79
End: 2019-12-04

## 2019-12-05 ENCOUNTER — HOSPITAL ENCOUNTER (OUTPATIENT)
Dept: RADIATION ONCOLOGY | Age: 79
Discharge: HOME | End: 2019-12-05
Attending: RADIOLOGY
Payer: MEDICARE

## 2019-12-05 VITALS
RESPIRATION RATE: 20 BRPM | BODY MASS INDEX: 19.33 KG/M2 | WEIGHT: 148.56 LBS | SYSTOLIC BLOOD PRESSURE: 138 MMHG | TEMPERATURE: 98.1 F | DIASTOLIC BLOOD PRESSURE: 80 MMHG | HEART RATE: 83 BPM

## 2019-12-05 DIAGNOSIS — C34.12 MALIGNANT NEOPLASM OF UPPER LOBE OF LEFT LUNG (HCC): ICD-10-CM

## 2019-12-05 DIAGNOSIS — R91.8 MASS OF LINGULA OF LUNG: Primary | ICD-10-CM

## 2019-12-05 PROCEDURE — 99214 OFFICE O/P EST MOD 30 MIN: CPT | Performed by: RADIOLOGY

## 2019-12-05 PROCEDURE — 99212 OFFICE O/P EST SF 10 MIN: CPT

## 2020-01-03 RX ORDER — SOLIFENACIN SUCCINATE 10 MG/1
10 TABLET, FILM COATED ORAL EVERY OTHER DAY
Refills: 0 | COMMUNITY
Start: 2019-11-11

## 2020-01-08 ENCOUNTER — PREP FOR PROCEDURE (OUTPATIENT)
Dept: UROLOGY | Age: 80
End: 2020-01-08

## 2020-01-08 RX ORDER — SODIUM CHLORIDE 0.9 % (FLUSH) 0.9 %
10 SYRINGE (ML) INJECTION EVERY 12 HOURS SCHEDULED
Status: CANCELLED | OUTPATIENT
Start: 2020-01-08

## 2020-01-08 RX ORDER — SODIUM CHLORIDE 0.9 % (FLUSH) 0.9 %
10 SYRINGE (ML) INJECTION PRN
Status: CANCELLED | OUTPATIENT
Start: 2020-01-08

## 2020-01-09 ENCOUNTER — APPOINTMENT (OUTPATIENT)
Dept: GENERAL RADIOLOGY | Age: 80
End: 2020-01-09
Attending: UROLOGY
Payer: MEDICARE

## 2020-01-09 ENCOUNTER — ANESTHESIA EVENT (OUTPATIENT)
Dept: OPERATING ROOM | Age: 80
End: 2020-01-09
Payer: MEDICARE

## 2020-01-09 ENCOUNTER — ANESTHESIA (OUTPATIENT)
Dept: OPERATING ROOM | Age: 80
End: 2020-01-09
Payer: MEDICARE

## 2020-01-09 ENCOUNTER — HOSPITAL ENCOUNTER (OUTPATIENT)
Age: 80
Setting detail: OUTPATIENT SURGERY
Discharge: HOME | End: 2020-01-09
Attending: UROLOGY | Admitting: UROLOGY
Payer: MEDICARE

## 2020-01-09 VITALS
HEIGHT: 74 IN | SYSTOLIC BLOOD PRESSURE: 186 MMHG | TEMPERATURE: 97.4 F | OXYGEN SATURATION: 99 % | DIASTOLIC BLOOD PRESSURE: 80 MMHG | HEART RATE: 54 BPM | RESPIRATION RATE: 16 BRPM | BODY MASS INDEX: 19.12 KG/M2 | WEIGHT: 149 LBS

## 2020-01-09 VITALS
SYSTOLIC BLOOD PRESSURE: 113 MMHG | RESPIRATION RATE: 11 BRPM | DIASTOLIC BLOOD PRESSURE: 74 MMHG | OXYGEN SATURATION: 100 %

## 2020-01-09 PROBLEM — N30.41 IRRADIATION CYSTITIS WITH HEMATURIA: Status: ACTIVE | Noted: 2020-01-09

## 2020-01-09 PROCEDURE — 6360000002 HC RX W HCPCS: Performed by: UROLOGY

## 2020-01-09 PROCEDURE — 6370000000 HC RX 637 (ALT 250 FOR IP): Performed by: UROLOGY

## 2020-01-09 PROCEDURE — 2709999900 HC NON-CHARGEABLE SUPPLY: Performed by: UROLOGY

## 2020-01-09 PROCEDURE — 88112 CYTOPATH CELL ENHANCE TECH: CPT

## 2020-01-09 PROCEDURE — 74420 UROGRAPHY RTRGR +-KUB: CPT

## 2020-01-09 PROCEDURE — 2580000003 HC RX 258: Performed by: UROLOGY

## 2020-01-09 PROCEDURE — 7100000010 HC PHASE II RECOVERY - FIRST 15 MIN: Performed by: UROLOGY

## 2020-01-09 PROCEDURE — 87088 URINE BACTERIA CULTURE: CPT

## 2020-01-09 PROCEDURE — 87186 SC STD MICRODIL/AGAR DIL: CPT

## 2020-01-09 PROCEDURE — 3600000012 HC SURGERY LEVEL 2 ADDTL 15MIN: Performed by: UROLOGY

## 2020-01-09 PROCEDURE — C1758 CATHETER, URETERAL: HCPCS | Performed by: UROLOGY

## 2020-01-09 PROCEDURE — 3700000000 HC ANESTHESIA ATTENDED CARE: Performed by: UROLOGY

## 2020-01-09 PROCEDURE — 7100000011 HC PHASE II RECOVERY - ADDTL 15 MIN: Performed by: UROLOGY

## 2020-01-09 PROCEDURE — 6360000002 HC RX W HCPCS: Performed by: ANESTHESIOLOGIST ASSISTANT

## 2020-01-09 PROCEDURE — 3700000001 HC ADD 15 MINUTES (ANESTHESIA): Performed by: UROLOGY

## 2020-01-09 PROCEDURE — 87077 CULTURE AEROBIC IDENTIFY: CPT

## 2020-01-09 PROCEDURE — 3600000002 HC SURGERY LEVEL 2 BASE: Performed by: UROLOGY

## 2020-01-09 RX ORDER — FENTANYL CITRATE 50 UG/ML
INJECTION, SOLUTION INTRAMUSCULAR; INTRAVENOUS PRN
Status: DISCONTINUED | OUTPATIENT
Start: 2020-01-09 | End: 2020-01-09 | Stop reason: SDUPTHER

## 2020-01-09 RX ORDER — LIDOCAINE HYDROCHLORIDE 20 MG/ML
INJECTION, SOLUTION INTRAVENOUS PRN
Status: DISCONTINUED | OUTPATIENT
Start: 2020-01-09 | End: 2020-01-09 | Stop reason: SDUPTHER

## 2020-01-09 RX ORDER — PHENAZOPYRIDINE HYDROCHLORIDE 200 MG/1
200 TABLET, FILM COATED ORAL 2 TIMES DAILY PRN
Qty: 10 TABLET | Refills: 2 | Status: SHIPPED | OUTPATIENT
Start: 2020-01-09 | End: 2021-01-06 | Stop reason: ALTCHOICE

## 2020-01-09 RX ORDER — CEFAZOLIN SODIUM 2 G/50ML
2 SOLUTION INTRAVENOUS
Status: COMPLETED | OUTPATIENT
Start: 2020-01-09 | End: 2020-01-09

## 2020-01-09 RX ORDER — SODIUM CHLORIDE 9 MG/ML
INJECTION INTRAVENOUS PRN
Status: DISCONTINUED | OUTPATIENT
Start: 2020-01-09 | End: 2020-01-09 | Stop reason: ALTCHOICE

## 2020-01-09 RX ORDER — PROPOFOL 10 MG/ML
INJECTION, EMULSION INTRAVENOUS CONTINUOUS PRN
Status: DISCONTINUED | OUTPATIENT
Start: 2020-01-09 | End: 2020-01-09 | Stop reason: SDUPTHER

## 2020-01-09 RX ORDER — MIDAZOLAM HYDROCHLORIDE 1 MG/ML
INJECTION INTRAMUSCULAR; INTRAVENOUS PRN
Status: DISCONTINUED | OUTPATIENT
Start: 2020-01-09 | End: 2020-01-09 | Stop reason: SDUPTHER

## 2020-01-09 RX ORDER — ATROPA BELLADONNA AND OPIUM 16.2; 6 MG/1; MG/1
SUPPOSITORY RECTAL PRN
Status: DISCONTINUED | OUTPATIENT
Start: 2020-01-09 | End: 2020-01-09 | Stop reason: ALTCHOICE

## 2020-01-09 RX ORDER — SODIUM CHLORIDE 9 MG/ML
INJECTION, SOLUTION INTRAVENOUS CONTINUOUS
Status: DISCONTINUED | OUTPATIENT
Start: 2020-01-09 | End: 2020-01-09 | Stop reason: HOSPADM

## 2020-01-09 RX ORDER — SODIUM CHLORIDE 0.9 % (FLUSH) 0.9 %
10 SYRINGE (ML) INJECTION EVERY 12 HOURS SCHEDULED
Status: DISCONTINUED | OUTPATIENT
Start: 2020-01-09 | End: 2020-01-09 | Stop reason: HOSPADM

## 2020-01-09 RX ORDER — CEPHALEXIN 500 MG/1
500 CAPSULE ORAL 2 TIMES DAILY
Qty: 10 CAPSULE | Refills: 1 | Status: SHIPPED | OUTPATIENT
Start: 2020-01-09 | End: 2020-01-14

## 2020-01-09 RX ORDER — SODIUM CHLORIDE 0.9 % (FLUSH) 0.9 %
10 SYRINGE (ML) INJECTION PRN
Status: DISCONTINUED | OUTPATIENT
Start: 2020-01-09 | End: 2020-01-09 | Stop reason: HOSPADM

## 2020-01-09 RX ORDER — ONDANSETRON 2 MG/ML
4 INJECTION INTRAMUSCULAR; INTRAVENOUS EVERY 6 HOURS PRN
Status: DISCONTINUED | OUTPATIENT
Start: 2020-01-09 | End: 2020-01-09 | Stop reason: HOSPADM

## 2020-01-09 RX ORDER — MEPERIDINE HYDROCHLORIDE 50 MG/ML
12.5 INJECTION INTRAMUSCULAR; INTRAVENOUS; SUBCUTANEOUS EVERY 5 MIN PRN
Status: DISCONTINUED | OUTPATIENT
Start: 2020-01-09 | End: 2020-01-09 | Stop reason: HOSPADM

## 2020-01-09 RX ADMIN — MIDAZOLAM 2 MG: 1 INJECTION INTRAMUSCULAR; INTRAVENOUS at 12:53

## 2020-01-09 RX ADMIN — SODIUM CHLORIDE: 9 INJECTION, SOLUTION INTRAVENOUS at 12:43

## 2020-01-09 RX ADMIN — FENTANYL CITRATE 50 MCG: 50 INJECTION, SOLUTION INTRAMUSCULAR; INTRAVENOUS at 12:53

## 2020-01-09 RX ADMIN — FENTANYL CITRATE 25 MCG: 50 INJECTION, SOLUTION INTRAMUSCULAR; INTRAVENOUS at 12:57

## 2020-01-09 RX ADMIN — PROPOFOL 75 MCG/KG/MIN: 10 INJECTION, EMULSION INTRAVENOUS at 12:53

## 2020-01-09 RX ADMIN — LIDOCAINE HYDROCHLORIDE 100 MG: 20 INJECTION, SOLUTION INTRAVENOUS at 12:53

## 2020-01-09 RX ADMIN — SODIUM CHLORIDE: 9 INJECTION, SOLUTION INTRAVENOUS at 10:21

## 2020-01-09 RX ADMIN — FENTANYL CITRATE 25 MCG: 50 INJECTION, SOLUTION INTRAMUSCULAR; INTRAVENOUS at 13:03

## 2020-01-09 RX ADMIN — CEFAZOLIN SODIUM 2 G: 2 SOLUTION INTRAVENOUS at 12:54

## 2020-01-09 ASSESSMENT — PULMONARY FUNCTION TESTS
PIF_VALUE: 0
PIF_VALUE: 1
PIF_VALUE: 0

## 2020-01-09 ASSESSMENT — PAIN - FUNCTIONAL ASSESSMENT: PAIN_FUNCTIONAL_ASSESSMENT: 0-10

## 2020-01-09 ASSESSMENT — PAIN SCALES - GENERAL
PAINLEVEL_OUTOF10: 0
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
## 2020-01-09 NOTE — PROGRESS NOTES
Dr Claudio Billings notified patient does not response to verbal commands, VSS, respirations unlabored.  HOB elevated

## 2020-01-10 NOTE — OP NOTE
[address]                  [address] [place],  [facility]                                OPERATIVE REPORT    PATIENT NAME: Ortega Pang                :        1940  MED REC NO:   [id number]                            ROOM:  ACCOUNT NO:   [de-identified]                           ADMIT DATE: 2020  PROVIDER:     Sulema Llamas MD    DATE OF PROCEDURE:  2020    PREOPERATIVE DIAGNOSES:  History of radiation cystitis; stage 0  prostatic carcinoma; evaluate upper tract overactive bladder, urgency  and stress incontinence. POSTOPERATIVE DIAGNOSES:  Normal upper tract, resolved hemorrhagic  cystitis, overactive bladder associated with radiation cystitis, stage 0  prostate cancer. OPERATION:  Cystopanendoscopy, bilateral retrograde pyelograms, and  Botox injection 100 units. ANESTHESIA:  Monitored sedation and B and O suppository given at the end  of the case. CONDITION:  Stable. BLOOD LOSS:  5 mL. DISPOSITION:  PACU, then home. DRAINS:    No Contreras. SURGEON:  Sulema Llamas MD    DESCRIPTION OF PROCEDURE:  The timeout was read by me, the Anesthesia  and the operating staff; reviewed history, physical, allergy and  medications. All in agreement. 2 gm of Ancef upon induction. The  patient was placed in lithotomy position, prepped and draped in usual  fashion. No undue tension on the hips, knees, or buttocks. #21  panendoscope and obturator inserted into the urethra. No strictures,  false passages, abrasions, ulcerations, or cystic or solid lesions. The  verumontanum was intact. Prostatic fossa was patulous. The bladder was  entered and urine obtained for culture and cytology. Careful  examination of the bladder demonstrates no stone, clots or foreign  bodies in the lumen. Bladder is trabeculated with early cellule, but no  diverticular formation. Bladder capacity is under 300 mL.   There was no  cystitis cystica, enterovesical fistula or extrinsic imprints. There  was nothing in the mucosa needed to be biopsied. There was no lesions  _____ that were inflamed or solid or cystic. The trigone was poorly  developed. The right ureteral orifice and left ureteral orifice had  around rather a slit-like appearance.  film of the abdomen  demonstrates no stones in the estimated area of the kidneys or ureters. An Olive-tip catheter was used on the right and I was able to  demonstrate somewhat of a tortuous but normal ureter. No intrinsic or  extrinsic abnormalities. The UPJ, the infundibulum and the calyces were  intact. No splaying of the infundibulum calyces on the left side. I  had difficulty instrumenting the orifice. I tried a Glidewire and an  open-ended catheter, eventually I used a cone catheter and I was able to  inject contrast.  Again, a somewhat tortuous ureter. No ureterectasis,  no ureteral deviation, no upper tract abnormalities, nothing that would  be accounted for hematuria. There was no splaying of the calyces,  infundibulum, and the UPJ, and all was intact. The patient had Botox  injected 0.5 mL, total of 100 units was done and _____. No Contreras was  necessary. Rectal:  Good anal tone. No hemorrhoids, no mass, no  impaction. I felt the patient might be uncomfortable with the  instrumentation and given a B and O suppository. He was advised that he  would not expect the improvement with the Botox for about two weeks. His prostate is involuted and no sign of nodules or residual prostate  cancer.         Michelle Zavaleta MD    D: 01/09/2020 13:47:21       T: 01/09/2020 13:56:07     [email or web address]  Job#: [id number]     Doc#: [id number]    CC:  Melody Reddy MD

## 2020-01-11 LAB
ORGANISM: ABNORMAL
URINE CULTURE, ROUTINE: ABNORMAL

## 2020-02-24 ENCOUNTER — HOSPITAL ENCOUNTER (OUTPATIENT)
Age: 80
Discharge: HOME | End: 2020-02-26
Payer: MEDICARE

## 2020-02-24 PROCEDURE — 87088 URINE BACTERIA CULTURE: CPT

## 2020-02-24 PROCEDURE — 87186 SC STD MICRODIL/AGAR DIL: CPT

## 2020-02-24 PROCEDURE — 87077 CULTURE AEROBIC IDENTIFY: CPT

## 2020-02-27 LAB
ORGANISM: ABNORMAL
URINE CULTURE, ROUTINE: ABNORMAL
URINE CULTURE, ROUTINE: ABNORMAL

## 2020-03-10 ENCOUNTER — TELEPHONE (OUTPATIENT)
Dept: RADIATION ONCOLOGY | Age: 80
End: 2020-03-10

## 2020-03-10 NOTE — TELEPHONE ENCOUNTER
Patient's sister called regarding follow up appointment with Dr Tere Torres on 3/19 @ 1pm-prior to follow up, patient will need to have blood work drawn with CT of Chest completed on Tuesday, 3/17/2020 to arrive @ [facility] at 2pm-instruction r/t npo 4 hours prior to imaging given to patient's sister, Stormy Rogers. All questions answered with Stormy Rogers able to reiterate appointment times and instructions without difficulty-office phone number given should there be any further questions.

## 2020-03-16 ENCOUNTER — HOSPITAL ENCOUNTER (OUTPATIENT)
Age: 80
Discharge: HOME | End: 2020-03-16
Payer: MEDICARE

## 2020-03-16 LAB
BUN BLDV-MCNC: 24 MG/DL (ref 8–23)
CREAT SERPL-MCNC: 1.2 MG/DL (ref 0.7–1.2)
GFR AFRICAN AMERICAN: >60
GFR NON-AFRICAN AMERICAN: >60 ML/MIN/1.73

## 2020-03-16 PROCEDURE — 36415 COLL VENOUS BLD VENIPUNCTURE: CPT

## 2020-03-16 PROCEDURE — 84520 ASSAY OF UREA NITROGEN: CPT

## 2020-03-16 PROCEDURE — 82565 ASSAY OF CREATININE: CPT

## 2020-03-17 ENCOUNTER — HOSPITAL ENCOUNTER (OUTPATIENT)
Dept: CT IMAGING | Age: 80
Discharge: HOME | End: 2020-03-19
Payer: MEDICARE

## 2020-03-17 PROCEDURE — 71260 CT THORAX DX C+: CPT

## 2020-03-17 PROCEDURE — 6360000004 HC RX CONTRAST MEDICATION: Performed by: RADIOLOGY

## 2020-03-17 PROCEDURE — 2580000003 HC RX 258: Performed by: RADIOLOGY

## 2020-03-17 RX ORDER — SODIUM CHLORIDE 0.9 % (FLUSH) 0.9 %
10 SYRINGE (ML) INJECTION ONCE
Status: COMPLETED | OUTPATIENT
Start: 2020-03-17 | End: 2020-03-17

## 2020-03-17 RX ADMIN — Medication 10 ML: at 14:15

## 2020-03-17 RX ADMIN — IOPAMIDOL 90 ML: 755 INJECTION, SOLUTION INTRAVENOUS at 14:13

## 2020-05-18 ENCOUNTER — HOSPITAL ENCOUNTER (OUTPATIENT)
Age: 80
Discharge: HOME | End: 2020-05-20
Payer: MEDICARE

## 2020-05-18 PROCEDURE — 87088 URINE BACTERIA CULTURE: CPT

## 2020-05-20 LAB — URINE CULTURE, ROUTINE: NORMAL

## 2020-06-23 ENCOUNTER — TELEPHONE (OUTPATIENT)
Dept: RADIATION ONCOLOGY | Age: 80
End: 2020-06-23

## 2020-06-23 ENCOUNTER — HOSPITAL ENCOUNTER (OUTPATIENT)
Dept: RADIATION ONCOLOGY | Age: 80
Discharge: HOME | End: 2020-06-23
Payer: MEDICARE

## 2020-06-23 VITALS — WEIGHT: 149 LBS | BODY MASS INDEX: 19.13 KG/M2

## 2020-06-23 PROCEDURE — 99442 PR PHYS/QHP TELEPHONE EVALUATION 11-20 MIN: CPT | Performed by: RADIOLOGY

## 2020-06-23 NOTE — PROGRESS NOTES
Procedure Laterality Date    COLONOSCOPY      COLONOSCOPY  2012    CYSTOSCOPY N/A 2020    CYSTOSCOPY RETROGRADE, BOTOX  INJECTION 100 UNITS performed by Eddie Garnica MD at [address] OTHER SURGICAL HISTORY  13    cysto         Social History     Socioeconomic History    Marital status: Single     Spouse name: Not on file    Number of children: Not on file    Years of education: Not on file    Highest education level: Not on file   Occupational History    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[address] resource strain: Not on file    Food insecurity     Worry: Not on file     Inability: Not on file   [employer] needs     Medical: Not on file     Non-medical: Not on file   Tobacco Use    Smoking status: Former Smoker     Packs/day: 0.75     Years: 60.00     Pack years: 45.00     Types: Cigarettes     Start date: 1959     Last attempt to quit: 2019     Years since quittin.9    Smokeless tobacco: Never Used   Substance and Sexual Activity    Alcohol use: No    Drug use: Never    Sexual activity: Not Currently   Lifestyle    Physical activity     Days per week: Not on file     Minutes per session: Not on file    Stress: Not on file   Relationships    Social connections     Talks on phone: Not on file     Gets together: Not on file     Attends Hinduism service: Not on file     Active member of club or organization: Not on file     Attends meetings of clubs or organizations: Not on file     Relationship status: Not on file    Intimate partner violence     Fear of current or ex partner: Not on file     Emotionally abused: Not on file     Physically abused: Not on file     Forced sexual activity: Not on file   Other Topics Concern    Not on file   Social History Narrative    Lives in [facility] alone. Retired from [employer].          Family History   Problem Relation Age of Onset    Heart Failure Mother         aortic anyeursm    Cancer Father         prostate ca    Cancer Maternal Aunt 60        breast       Allergies:   Codeine      Current Outpatient Medications   Medication Sig Dispense Refill    tamsulosin (FLOMAX) 0.4 MG capsule Take 0.4 mg by mouth 4 times daily as needed      baclofen (LIORESAL) 20 MG tablet Take 20 mg by mouth nightly      phenazopyridine (PYRIDIUM) 200 MG tablet Take 1 tablet by mouth 2 times daily as needed for Pain 10 tablet 2    solifenacin (VESICARE) 10 MG tablet Take 10 mg by mouth every other day   0    budesonide-formoterol (SYMBICORT) 160-4.5 MCG/ACT AERO Inhale 2 puffs into the lungs 2 times daily 1 Inhaler 6    albuterol (PROVENTIL) (2.5 MG/3ML) 0.083% nebulizer solution Take 3 mLs by nebulization every 6 hours as needed for Wheezing DX: COPD J44.9, Lung Cancer 120 each 3    vitamin D (CHOLECALCIFEROL) 1000 UNIT TABS tablet Take 2,000 Units by mouth daily       amLODIPine (NORVASC) 5 MG tablet Take 5 mg by mouth daily  0     No current facility-administered medications for this encounter. REVIEW OF SYSTEMS    Negative except for JACKSON. -PE   -NA        A/P:        -indication / CC: Lung CA  -Pt status:  New lesion   -SE:  -  -FU plans:  PET   -orders to sched: PET and FU with me  1 mo. *I spent at least 15 MIN on this case  (as above) and with this patient today including personally performing/reviewing the chart review, history, ROS, and providing a summary and description of the detailed medical decision making as a basis for any and all recommendations made today (+/- a pertinent RBA discussion): literature and radiology reviews were performed as noted and applicable (00% or more time was spent in direct patient ) - as well as appropriate FU orders. -PET and FU  after PET to evaluate new lesion. Efrem Patel.  Ekaterina Montanez MD [facility] [address]  Radiation Oncology  Cell:

## 2020-06-23 NOTE — TELEPHONE ENCOUNTER
RN placed call to The [address] [place] [address] to request most recent office visit/physician's note-patient was a no-call/no-show last November-patient has not been seen by Dr Misa Roberts since August 2019. Dr Negra Gomez updated.

## 2020-06-29 ENCOUNTER — HOSPITAL ENCOUNTER (OUTPATIENT)
Dept: PET IMAGING | Age: 80
Discharge: HOME | End: 2020-07-01
Payer: MEDICARE

## 2020-06-29 LAB — METER GLUCOSE: 109 MG/DL (ref 74–99)

## 2020-06-29 PROCEDURE — 78815 PET IMAGE W/CT SKULL-THIGH: CPT

## 2020-06-29 PROCEDURE — A9552 F18 FDG: HCPCS | Performed by: RADIOLOGY

## 2020-06-29 PROCEDURE — 82962 GLUCOSE BLOOD TEST: CPT

## 2020-06-29 PROCEDURE — 3430000000 HC RX DIAGNOSTIC RADIOPHARMACEUTICAL: Performed by: RADIOLOGY

## 2020-06-29 RX ORDER — FLUDEOXYGLUCOSE F 18 200 MCI/ML
15 INJECTION, SOLUTION INTRAVENOUS
Status: COMPLETED | OUTPATIENT
Start: 2020-06-29 | End: 2020-06-29

## 2020-06-29 RX ADMIN — FLUDEOXYGLUCOSE F 18 15 MILLICURIE: 200 INJECTION, SOLUTION INTRAVENOUS at 10:54

## 2020-08-18 PROBLEM — R41.3 MEMORY IMPAIRMENT: Status: ACTIVE | Noted: 2020-08-18

## 2020-08-27 ENCOUNTER — HOSPITAL ENCOUNTER (OUTPATIENT)
Dept: RADIATION ONCOLOGY | Age: 80
Discharge: HOME | End: 2020-08-27
Payer: MEDICARE

## 2020-08-27 VITALS — BODY MASS INDEX: 19.13 KG/M2 | WEIGHT: 149 LBS

## 2020-08-27 PROCEDURE — 99442 PR PHYS/QHP TELEPHONE EVALUATION 11-20 MIN: CPT | Performed by: RADIOLOGY
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
## 2020-08-27 NOTE — PATIENT INSTRUCTIONS
PULM    FU, possible SBRT      Lenny Nassar. Tadeo Gonzales MD [place] [address] Oncology  Cell: [phone]    [facility]:  [phone]   FAX: [phone] [address] [facility]:  [address] [place] [address] Avenue:    [phone]  [address] [place] [address] Road:  [phone]   FAX:  [phone]  Email: [email or web address]

## 2020-09-22 ENCOUNTER — HOSPITAL ENCOUNTER (OUTPATIENT)
Age: 80
Discharge: HOME | End: 2020-09-22
Payer: MEDICARE

## 2020-09-22 LAB
ANION GAP SERPL CALCULATED.3IONS-SCNC: 10 MMOL/L (ref 7–16)
BUN BLDV-MCNC: 26 MG/DL (ref 8–23)
CALCIUM SERPL-MCNC: 9.4 MG/DL (ref 8.6–10.2)
CHLORIDE BLD-SCNC: 99 MMOL/L (ref 98–107)
CO2: 30 MMOL/L (ref 22–29)
CREAT SERPL-MCNC: 1.1 MG/DL (ref 0.7–1.2)
GFR AFRICAN AMERICAN: >60
GFR NON-AFRICAN AMERICAN: >60 ML/MIN/1.73
GLUCOSE BLD-MCNC: 141 MG/DL (ref 74–99)
HCT VFR BLD CALC: 46.1 % (ref 37–54)
HEMOGLOBIN: 14.1 G/DL (ref 12.5–16.5)
MCH RBC QN AUTO: 25.8 PG (ref 26–35)
MCHC RBC AUTO-ENTMCNC: 30.6 % (ref 32–34.5)
MCV RBC AUTO: 84.4 FL (ref 80–99.9)
PDW BLD-RTO: 20.9 FL (ref 11.5–15)
PLATELET # BLD: 102 E9/L (ref 130–450)
PMV BLD AUTO: ABNORMAL FL (ref 7–12)
POTASSIUM SERPL-SCNC: 3.8 MMOL/L (ref 3.5–5)
PROSTATE SPECIFIC ANTIGEN: 0.46 NG/ML (ref 0–4)
RBC # BLD: 5.46 E12/L (ref 3.8–5.8)
SODIUM BLD-SCNC: 139 MMOL/L (ref 132–146)
TESTOSTERONE TOTAL: 432.4 NG/DL
WBC # BLD: 4 E9/L (ref 4.5–11.5)

## 2020-09-22 PROCEDURE — 85027 COMPLETE CBC AUTOMATED: CPT

## 2020-09-22 PROCEDURE — 84153 ASSAY OF PSA TOTAL: CPT

## 2020-09-22 PROCEDURE — 36415 COLL VENOUS BLD VENIPUNCTURE: CPT

## 2020-09-22 PROCEDURE — 80048 BASIC METABOLIC PNL TOTAL CA: CPT

## 2020-09-22 PROCEDURE — 84403 ASSAY OF TOTAL TESTOSTERONE: CPT

## 2020-10-08 ENCOUNTER — APPOINTMENT (OUTPATIENT)
Dept: RADIATION ONCOLOGY | Age: 80
End: 2020-10-08
Payer: MEDICARE

## 2020-10-09 ENCOUNTER — APPOINTMENT (OUTPATIENT)
Dept: RADIATION ONCOLOGY | Age: 80
End: 2020-10-09
Payer: MEDICARE

## 2020-10-12 ENCOUNTER — HOSPITAL ENCOUNTER (OUTPATIENT)
Dept: RADIATION ONCOLOGY | Age: 80
Discharge: HOME | End: 2020-10-12
Payer: MEDICARE

## 2020-10-12 VITALS
HEART RATE: 60 BPM | BODY MASS INDEX: 19.79 KG/M2 | WEIGHT: 150 LBS | RESPIRATION RATE: 18 BRPM | DIASTOLIC BLOOD PRESSURE: 70 MMHG | OXYGEN SATURATION: 86 % | SYSTOLIC BLOOD PRESSURE: 120 MMHG

## 2020-10-12 PROCEDURE — 99215 OFFICE O/P EST HI 40 MIN: CPT | Performed by: RADIOLOGY

## 2020-10-12 PROCEDURE — 99215 OFFICE O/P EST HI 40 MIN: CPT

## 2020-10-12 NOTE — PROGRESS NOTES
Julius Pantera Payton  1940 78 y.o. Referring Physician: Dr Roxanna Yanez    PCP: Shania Stafford MD     There were no vitals filed for this visit. Wt Readings from Last 3 Encounters:   09/21/20 150 lb (68 kg)   08/27/20 149 lb (67.6 kg)   06/23/20 149 lb (67.6 kg)        There is no height or weight on file to calculate BMI. Chief Complaint: No chief complaint on file. Cancer Staging  No matching staging information was found for the patient. Prior Radiation Therapy? 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 NO    Prior Chemotherapy? NO    Prior Hormonal Therapy? NO, pt unaware at this time    Head and Neck Cancer? No, patient does NOT have HN cancer. Current Outpatient Medications   Medication Sig Dispense Refill    Cholecalciferol (VITAMIN D3) 50 MCG (2000 UT) TABS Take 2,000 Units by mouth daily      tamsulosin (FLOMAX) 0.4 MG capsule Take 0.4 mg by mouth 4 times daily as needed      baclofen (LIORESAL) 20 MG tablet Take 20 mg by mouth nightly      phenazopyridine (PYRIDIUM) 200 MG tablet Take 1 tablet by mouth 2 times daily as needed for Pain 10 tablet 2    solifenacin (VESICARE) 10 MG tablet Take 10 mg by mouth every other day   0    budesonide-formoterol (SYMBICORT) 160-4.5 MCG/ACT AERO Inhale 2 puffs into the lungs 2 times daily 1 Inhaler 6    albuterol (PROVENTIL) (2.5 MG/3ML) 0.083% nebulizer solution Take 3 mLs by nebulization every 6 hours as needed for Wheezing DX: COPD J44.9, Lung Cancer 120 each 3    amLODIPine (NORVASC) 5 MG tablet Take 5 mg by mouth daily  0     No current facility-administered medications for this encounter.         Past Medical History:   Diagnosis Date    Cancer [facility]) 2009    Prostate treated with irradiation.     Hypertension     Lung disease     Memory impairment 2020    Other accident     PUNCTURED LUNG       Past Surgical History:   Procedure Laterality Date    COLONOSCOPY      COLONOSCOPY  2012    CYSTOSCOPY N/A 2020    CYSTOSCOPY RETROGRADE, BOTOX  INJECTION 100 UNITS performed by Janet Daley MD at [address]    RIGHT HAND TRAUMA    OTHER SURGICAL HISTORY  13    cysto       Family History   Problem Relation Age of Onset    Heart Failure Mother         aortic anyeursm    Cancer Father         prostate ca    Cancer Maternal Aunt 61        breast       Social History     Socioeconomic History    Marital status: Single     Spouse name: Not on file    Number of children: Not on file    Years of education: Not on file    Highest education level: Not on file   Occupational History    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[employer] resource strain: Not on file    Food insecurity     Worry: Not on file     Inability: Not on file   [employer] needs     Medical: Not on file     Non-medical: Not on file   Tobacco Use    Smoking status: Former Smoker     Packs/day: 0.75     Years: 60.00     Pack years: 45.00     Types: Cigarettes     Start date: 1959     Last attempt to quit: 2019     Years since quittin.2    Smokeless tobacco: Never Used   Substance and Sexual Activity    Alcohol use: No    Drug use: Never    Sexual activity: Not Currently   Lifestyle    Physical activity     Days per week: Not on file     Minutes per session: Not on file    Stress: Not on file   Relationships    Social connections     Talks on phone: Not on file     Gets together: Not on file     Attends Bahai service: Not on file     Active member of club or organization: Not on file     Attends meetings of clubs or organizations: Not on file     Relationship status: Not on file   [employer] Intimate partner violence     Fear of current or ex partner: Not on file     Emotionally abused: Not on file     Physically abused: Not on file     Forced sexual activity: Not on file   Other Topics Concern    Not on file   Social History Narrative    [place]. Retired from [employer]. Occupation: disabled  Retired:  NO        REVIEW OF SYSTEMS: <<For Level 5, 10 or more systems>> Approximately 220 minutes was spent with patient and family member, Anthony Schooling slides and handouts, related to receiving radiation therapy to new Pet Positive Lung Lesion as noted on Pet Scan completed on 6/29/2020 to [facility] with peak SUV of 29.6. Patient follows with Dr Mily Duarte, last office visit being 9/21/2020 with next appointment now scheduled for 10/14/2020 as family member would like patient to have access to supplemental oxygen therapy in his home. Oxygen was previously ordered but patient did not feel an need for it at that time. As to receiving radiation therapy, patient is unsure if he is interested in again receiving treatment. Patient voices concerns regarding dying quickly and heaven verses that of living with current times. RN attempted encouraging patient regarding supplemental oxygen as patient's SaO2 registered 86%-patient denied feeling short of breath or any other difficulties related to low oxygen saturation. RN answered all patient's questions to the best of her ability, attempting to remain upbeat and encouraging. Patient expressed understanding regarding the radiation therapy treatment process. Pacemaker/Defibulator/ICD:  No    Mediport: No        FALLS RISK SCREENING ASSESSMENT    Instructions:  Assess the patient and enter the appropriate indicators that are present for fall risk identification. Total the numbers entered and assign a fall risk score from Table 2.  Reassess patient at a minimum every 12 weeks or with status change. Assessment   Date  10/12/2020     1.   Mental Ability: confusion/cognitively impaired No - 0       2. Elimination Issues: incontinence, frequency No - 0       3. Ambulatory: use of assistive devices (walker, cane, off-loading devices), attached to equipment (IV pole, oxygen) No - 0     4. Sensory Limitations: dizziness, vertigo, impaired vision Yes - 3       5. Age 72 years or greater - 1       10. Medication: diuretics, strong analgesics, hypnotics, sedatives, antihypertensive agents   No - 0   7. Falls:  recent history of falls within the last 3 months (not to include slipping or tripping)   No - 0   TOTAL 4    If score of 4 or greater was education given? Yes       TABLE 2   Risk Score Risk Level Plan of Care   0-3 Little or  No Risk 1. Provide assistance as indicated for ambulation activities  2. Reorient confused/cognitively impaired patient  3. Call-light/bell within patient's reach  4. Chair/bed in low position, stretcher/bed with siderails up except when performing patient care activities  5. Educate patient/family/caregiver on falls prevention  6.  Reassess in 12 weeks or with any noted change in patient condition which places them at a risk for a fall   4-6 Moderate Risk 1. Provide assistance as indicated for ambulation activities  2. Reorient confused/cognitively impaired patient  3. Call-light/bell within patient's reach  4. Chair/bed in low position, stretcher/bed with siderails up except when performing patient care activities  5. Educate patient/family/caregiver on falls prevention  6. Falls risk precaution (Yellow sticker Level II) placed on patient chart   7 or   Higher High Risk 1. Place patient in easily observable treatment room  2. Patient attended at all times by family member or staff  3. Provide assistance as indicated for ambulation activities  4. Reorient confused/cognitively impaired patient  5. Call-light/bell within patient's reach  6.   Chair/bed in low position, stretcher/bed with siderails up except when performing patient care activities  7. Educate patient/family/caregiver on falls prevention  8. Falls risk precaution (Yellow sticker Level III) placed on patient chart           MALNUTRITION RISK SCREENING ASSESSMENT    Instructions:  Assess the patient and enter the appropriate indicators that are present for nutrition risk identification. Total the numbers entered and assign a risk score. Follow the appropriate action for total score listed below. Assessment   Date  10/12/2020     1. Have you lost weight without trying? 3- Yes, 10.1 kg to 15 kg     2. Have you been eating poorly because of a decreased appetite? 1- Yes   3. Do you have a diagnosis of head and neck cancer?       0- No                                                                                    TOTAL 4          Score of 0-1: No action  Score 2 or greater:  · For Non-Diabetic Patient: Recommend adding Ensure Complete 2 x daily and provide patient with Ensure wellness bag with coupons  · For Diabetic Patient: Recommend adding Glucerna Shake 2 x daily and provide patient with Glucerna Wellness bag with coupons  · Route to the dietitian via [address]    · Are you having  difficulty performing daily routine tasks  due to fatigue or weakness (ie: bathing/showering, dressing, housework, meal prep, work, child Kristopher Angle): No     · Do you have any arm flexibility/ROM restrictions, swelling or pain that limit activity: No     · Any changes in memory, attention/focus that impact daily activities: No     · Do you avoid participation in leisure/social activity due weakness, fatigue or pain: No     ARE ANY OF THE ABOVE ARE ANSWERED YES: No          PT ASSESSMENT FOR REFERRAL    · Have you had any recent falls in past 2 months: No     · Do you have difficulty  going up/down stairs: No     · Are you having difficulty walking: No     · Do you often hold onto furniture/environmental supports or feel off balance when you are walking: Yes     · Do you need to take rest breaks when you are walking: Yes     · Any pain on scale of 1-10 that limits your mobility: No 0/10    ARE ANY OF THE ABOVE ARE ANSWERED YES: Yes - but NO PT referral request sent due to patient refusal.           LYMPHEDEMA SCREENING ASSESSMENT FOR PATIENTS WITH BREAST CANCER    The patient reports the following signs/symptoms of lymphedema: None    Please ask the provider to assess patient for lymphedema for any reported signs or symptoms so a referral to Lymphedema Therapy can be considered. PREHAB AUDIOLOGY REFERRAL    - Is patient planned to receive Cisplatin? No. This patient is not planned to start Cisplatin. - Is patient planned to receive radiation therapy that may be directed toward auditory canals or nerves? No. Patient is not planned to start radiation therapy to auditory canals or nerves. - Is patient complaining of new onset hearing loss? No. Patient is not complaining of new onset hearing loss. Patient education given on SBRT to Pet Positive MICHELA . The patient expresses understanding and acceptance of instructions.  Shannon Ball 10/12/2020 2:10 PM           Shannon Ball

## 2020-10-12 NOTE — PROGRESS NOTES
Radiation Oncology      Je Larson. Kerri Starr  [address] [place]   [phone]               Referring Physician: Dr. 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 This lesions was a SCC, cT1c cNo cMo / AJCC SG IA3 and responded well to definitive intent treatment with serial imaging per EPIC. Recently FU imaging noted a new left lung lesions, not the previously treated lesion. PET 6/29/20. The pt has seen PULM and declined a [address] consult. Jazmín Marti does not desires a biopsy or a surgical eval.  We did discuss the NCCN with the pt and his sister today. The patient presents today to discuss fractionated external beam radiation therapy as a component of multidisciplinary, definitive management. We reviewed the available medical records including the complete medical history of this pt today prior to consultation. Epic -CE and available scanned documents per the Epic Media tab were reviewed PRN. A complete ROS was also performed today and is noted below. During consultation today I personally discussed the pts workup to date; including but not limited to applicable imaging studies, Pathology reports, and interventions. The NCCN guidelines, as pertaining to the above diagnosis were also recapped for the pt today in brief. Today, Timbo Greene  notes Sx that include SOB and cough at BL. KPS 70. Past Medical History:   Diagnosis Date    Cancer [facility]) 2009    Prostate treated with irradiation.     Hypertension     Lung disease     Memory impairment 8/18/2020    Other accident 1999    PUNCTURED LUNG Past Surgical History:   Procedure Laterality Date    COLONOSCOPY  2003    COLONOSCOPY  05/01/2012    CYSTOSCOPY N/A 1/9/2020    CYSTOSCOPY RETROGRADE, BOTOX  INJECTION 100 UNITS performed by Aminta Killian MD at [address]    OTHER SURGICAL HISTORY  4/17/13    cysto       Family History   Problem Relation Age of Onset    Heart Failure Mother         aortic anyeursm    Cancer Father         prostate ca    Cancer Maternal Aunt 60        breast       Current Outpatient Medications   Medication Sig Dispense Refill    Cholecalciferol (VITAMIN D3) 50 MCG (2000 UT) TABS Take 2,000 Units by mouth daily      tamsulosin (FLOMAX) 0.4 MG capsule Take 0.4 mg by mouth 4 times daily as needed      baclofen (LIORESAL) 20 MG tablet Take 20 mg by mouth nightly      phenazopyridine (PYRIDIUM) 200 MG tablet Take 1 tablet by mouth 2 times daily as needed for Pain 10 tablet 2    solifenacin (VESICARE) 10 MG tablet Take 10 mg by mouth every other day   0    budesonide-formoterol (SYMBICORT) 160-4.5 MCG/ACT AERO Inhale 2 puffs into the lungs 2 times daily 1 Inhaler 6    albuterol (PROVENTIL) (2.5 MG/3ML) 0.083% nebulizer solution Take 3 mLs by nebulization every 6 hours as needed for Wheezing DX: COPD J44.9, Lung Cancer 120 each 3    amLODIPine (NORVASC) 5 MG tablet Take 5 mg by mouth daily  0     No current facility-administered medications for this encounter.         Allergies   Allergen Reactions    Codeine Dermatitis         Social History     Socioeconomic History    Marital status: Single     Spouse name: None    Number of children: None    Years of education: None    Highest education level: None   Occupational History    Occupation: retired- [employer] [address] [place] [address] Financial resource strain: None    Food insecurity     Worry: None     Inability: None    Transportation needs     Medical: None     Non-medical: None   Tobacco Use    Smoking Pupils are equal, round, and reactive to light. Neck:      Musculoskeletal: Normal range of motion. Cardiovascular:      Rate and Rhythm: Normal rate and regular rhythm. Pulses: Normal pulses. Pulmonary:      Effort: Pulmonary effort is normal.   Abdominal:      General: Abdomen is flat. Palpations: Abdomen is soft. Musculoskeletal: Normal range of motion. Skin:     General: Skin is warm. Neurological:      General: No focal deficit present. Mental Status: He is alert and oriented to person, place, and time. Psychiatric:         Mood and Affect: Mood normal.         Behavior: Behavior normal.         Thought Content: Thought content normal.         Judgment: Judgment normal.             Imaging reviewed:        PET 6/29/20:  Impression    1.  FDG avid tracer uptake at the level the lingula exceeding the    threshold SUV and ectatic pattern compatible with neoplasm.  There is    otherwise tracer uptake seen which is likely physiologic    2.  Abdominal aortic aneurysm, which appears to have increased    significantly in size on the nondiagnostic CT scan when compared to    the previous CT images               Radiation Safety and Treatment Support:  -previous Radiation history: SBRT left lung   -history of connective tissue disease: No  -history of autoimmune disease: No  -pregnant: not applicable  -fertility conservation and /or contraception discussed: no  -nutrition consult prior to [address]: Yes  -PEG: No  -Dental evaluation prior to treatment:No  -Social Work requested: No  -Oncology Nurse Navigator requested: Yes  -pre + post treatment PT / Rehab / PM+R evaluation considered: Yes  -ICD: No   -ICD brand: -  -[facility] patient navigator: Master Osborne  -Nurse Practitioners for Radiation Oncology:    ---Lynette Ayers, CATY, RN, FNP-C   ---Jimmy Henson, MSN, RN, FNP-BC        Assessment and Plan: Matteo Clark is a pleasant and cooperative 78year old with a recent diagnosis of AJCC stage group I left cancer, this is a second primary. We again recommend definitive intent fractionated external beam radiation therapy with a 3-5 fraction SBRT approach [NCCN NSCLC 4.2019 NSCLC-C 7/10, 3/10 ]. The risks, benefits, alternatives, process and logistics of stereotactic body radiation therapy (SBRT / SABR) were reviewed and specifically discussed in great detail - (risks discussed today include but are not limited to radiation pneumonitis, worsening PULM function, hemoptysis, lack of response, rib fracture, chest wall pain, oxygen dependence, esophagitis, esophageal structure, perforation, radiation induced neuropathies, vascular injury, bleeding and death, paralysis, second malignancy). SBRT is typically considered safe in terms of acute and chronic pulmonary toxicity, even for patients with severe PULM comobidities [Candy et al. Deon Craig. 2012 Mar; 7(3): 133-657 / Int J Radiat Oncol Biol Phys. 2018 Feb 1;100(2):462-469 ]. A second opinion was offered to this pt and was declined today at consult. Guidelines applicable to this pt reviewed and applied to HANCOCK and medical decision making as available and applicable [Pract Radiat Oncol. 2017 Sep - Oct;7(5):295-301]. We answered all of the patient's questions to the best of our ability. The patient verbalized understanding and seemed satisfied, desiring definitive intent SBRT. Radiation planning will commence within 7-14 days; the next step in management being the simulation scan, with external beam radiation to commence in a timely fashion thereafter. Светлана Taisha [facility]) declines OBS and a/another biopsy attempt [Lung Cancer. 2014 Sep;85(3):390-4] although the standard of care was discussed as noted above per NCCN and PRO. Please reference PFTs per the Media tab / PULM documentation. We asked the pt to be compliant with recs as he was difficult to schedule today.    It was a pleasure meeting Huntington today and we appreciate the referral and opportunity to be involved in his care. We had an extensive discussion today regarding the course to date (including a focused review of theapplicable radiographic and laboratory information), multidisciplinary approach to cancer care, and indications for external beam radiation therapy as a component therein. A literature review and multidisciplinary discussion was performed after seeing this patient due to the complexity of the medical decision making in this case. I personally spent greater than 85 minutes on this case and with this patient. I performed the complete history and physical as above at today's visit, at least 45 minutes was in direct discussion and  regarding disease management.          -note prev Tx  -sim for SBRT left lingula   [RN ELHAM]        Selvin Cage. Shashank Bal MD [place] [address] Oncology  Cell: [phone]    [facility]:  [facility]: [phone]  [address] [place] [address] Street:  [phone]   FAX:    [phone]  [address] [place] [address] Road:  [phone]   FAX:  [phone]        NOTE: This report was transcribed using voice recognition software. Every effort was made to ensure accuracy; however, inadvertent computerized transcription errors may be present.

## 2020-10-21 ENCOUNTER — HOSPITAL ENCOUNTER (OUTPATIENT)
Dept: RADIATION ONCOLOGY | Age: 80
Discharge: HOME | End: 2020-10-21
Attending: RADIOLOGY
Payer: MEDICARE

## 2020-10-21 PROCEDURE — 77334 RADIATION TREATMENT AID(S): CPT | Performed by: RADIOLOGY

## 2020-10-21 PROCEDURE — 77263 THER RADIOLOGY TX PLNG CPLX: CPT | Performed by: RADIOLOGY

## 2020-11-03 ENCOUNTER — HOSPITAL ENCOUNTER (OUTPATIENT)
Dept: RADIATION ONCOLOGY | Age: 80
Discharge: HOME | End: 2020-11-03
Attending: RADIOLOGY
Payer: MEDICARE

## 2020-11-03 PROCEDURE — 77293 RESPIRATOR MOTION MGMT SIMUL: CPT | Performed by: RADIOLOGY

## 2020-11-03 PROCEDURE — 99999 PR OFFICE/OUTPT VISIT,PROCEDURE ONLY: CPT | Performed by: RADIOLOGY

## 2020-11-03 PROCEDURE — 77338 DESIGN MLC DEVICE FOR IMRT: CPT | Performed by: RADIOLOGY

## 2020-11-03 PROCEDURE — 77301 RADIOTHERAPY DOSE PLAN IMRT: CPT | Performed by: RADIOLOGY

## 2020-11-03 PROCEDURE — 77300 RADIATION THERAPY DOSE PLAN: CPT | Performed by: RADIOLOGY

## 2020-11-19 ENCOUNTER — HOSPITAL ENCOUNTER (OUTPATIENT)
Dept: RADIATION ONCOLOGY | Age: 80
Discharge: HOME | End: 2020-11-19
Attending: RADIOLOGY
Payer: MEDICARE

## 2020-11-19 PROCEDURE — 77293 RESPIRATOR MOTION MGMT SIMUL: CPT | Performed by: RADIOLOGY

## 2020-11-19 PROCEDURE — 77300 RADIATION THERAPY DOSE PLAN: CPT | Performed by: RADIOLOGY

## 2020-11-19 PROCEDURE — 77338 DESIGN MLC DEVICE FOR IMRT: CPT | Performed by: RADIOLOGY

## 2020-11-19 PROCEDURE — 77301 RADIOTHERAPY DOSE PLAN IMRT: CPT | Performed by: RADIOLOGY

## 2020-11-30 ENCOUNTER — HOSPITAL ENCOUNTER (OUTPATIENT)
Dept: RADIATION ONCOLOGY | Age: 80
Discharge: HOME | End: 2020-11-30
Attending: RADIOLOGY
Payer: MEDICARE

## 2020-11-30 PROCEDURE — 77014 PR CT GUIDANCE PLACEMENT RAD THERAPY FIELDS: CPT | Performed by: RADIOLOGY

## 2020-11-30 PROCEDURE — 77386 HC NTSTY MODUL RAD TX DLVR CPLX: CPT | Performed by: RADIOLOGY

## 2020-12-01 ENCOUNTER — HOSPITAL ENCOUNTER (OUTPATIENT)
Dept: RADIATION ONCOLOGY | Age: 80
Discharge: HOME | End: 2020-12-01
Attending: RADIOLOGY
Payer: MEDICARE

## 2020-12-01 PROCEDURE — 77386 HC NTSTY MODUL RAD TX DLVR CPLX: CPT | Performed by: RADIOLOGY

## 2020-12-01 PROCEDURE — 77014 PR CT GUIDANCE PLACEMENT RAD THERAPY FIELDS: CPT | Performed by: RADIOLOGY

## 2020-12-02 ENCOUNTER — HOSPITAL ENCOUNTER (OUTPATIENT)
Dept: RADIATION ONCOLOGY | Age: 80
Discharge: HOME | End: 2020-12-02
Attending: RADIOLOGY
Payer: MEDICARE

## 2020-12-02 VITALS
WEIGHT: 147.9 LBS | TEMPERATURE: 97.1 F | DIASTOLIC BLOOD PRESSURE: 68 MMHG | SYSTOLIC BLOOD PRESSURE: 142 MMHG | BODY MASS INDEX: 19.25 KG/M2 | HEART RATE: 76 BPM | RESPIRATION RATE: 18 BRPM

## 2020-12-02 PROCEDURE — 77014 PR CT GUIDANCE PLACEMENT RAD THERAPY FIELDS: CPT | Performed by: RADIOLOGY

## 2020-12-02 PROCEDURE — 77386 HC NTSTY MODUL RAD TX DLVR CPLX: CPT | Performed by: RADIOLOGY

## 2020-12-02 NOTE — PROGRESS NOTES
Juan Payton  12/2/2020  Wt Readings from Last 3 Encounters:   12/02/20 147 lb 14.4 oz (67.1 kg)   10/14/20 150 lb 3.2 oz (68.1 kg)   10/12/20 150 lb (68 kg)     Body mass index is 19.25 kg/m². Treatment Area  lung    Patient was seen today for weekly visit. Comfort Alteration  KPS:70%  Fatigue: None    Ventilation Alterations  Cough: No  Hemoptysis: No  Mucus Color: white  Dyspnea: No  O2 Sat: unable to get, fingers very cold and multiple tries made. Pt said forget it for now    Nutritional Alteration  Anorexia: No  Nausea: No   Vomiting: No     Skin Alteration   Sensation:no complaints at this time    Radiation Dermatitis:  Same as above    Mucous Membrane Alteration  Voice Changes/ Stridor/Larynx: no  Pharynx & Esophagus: na    Elimination Alterations  Constipation: yes  Diarrhea:  no      Emotional  Coping: effective      Injury, potential bleeding or infection: na    Other:    Lab Results   Component Value Date    WBC 4.0 (L) 09/22/2020     (L) 09/22/2020         BP (!) 142/68   Pulse 76   Temp 97.1 °F (36.2 °C) (Skin)   Resp 18   Wt 147 lb 14.4 oz (67.1 kg)   BMI 19.25 kg/m²   BP within normal range?  yes          Assessment/Plan: 3/10 fx, 1500/5000cGY     Barbara Escalona

## 2020-12-02 NOTE — PROGRESS NOTES
Kostas Chun Tata  12/2/2020  2:29 PM      No chief complaint on file. Wt Readings from Last 3 Encounters:   12/02/20 147 lb 14.4 oz (67.1 kg)   10/14/20 150 lb 3.2 oz (68.1 kg)   10/12/20 150 lb (68 kg)       Comments: There was 1500 cGy with SBRT to the lung. Patient is doing good. She has no complaints. She has no hemoptysis. She has no dysphagia. He has no change in his breathing. He denies any headache nausea or vomiting. He has no pain. Patient is tolerating treatment well.     Radiation to continue as planned radiation to continue as planned      Plan: Patient to continue as planned      Electronically signed by Boyd Molina MD on 12/2/20 at 2:29 PM EST

## 2020-12-03 ENCOUNTER — HOSPITAL ENCOUNTER (OUTPATIENT)
Dept: RADIATION ONCOLOGY | Age: 80
Discharge: HOME | End: 2020-12-03
Attending: RADIOLOGY
Payer: MEDICARE

## 2020-12-03 PROCEDURE — 77014 PR CT GUIDANCE PLACEMENT RAD THERAPY FIELDS: CPT | Performed by: RADIOLOGY

## 2020-12-03 PROCEDURE — 77386 HC NTSTY MODUL RAD TX DLVR CPLX: CPT | Performed by: RADIOLOGY

## 2020-12-04 ENCOUNTER — HOSPITAL ENCOUNTER (OUTPATIENT)
Dept: RADIATION ONCOLOGY | Age: 80
Discharge: HOME | End: 2020-12-04
Attending: RADIOLOGY
Payer: MEDICARE

## 2020-12-04 PROCEDURE — 77386 HC NTSTY MODUL RAD TX DLVR CPLX: CPT | Performed by: RADIOLOGY

## 2020-12-04 PROCEDURE — 77427 RADIATION TX MANAGEMENT X5: CPT | Performed by: RADIOLOGY

## 2020-12-04 PROCEDURE — 77014 PR CT GUIDANCE PLACEMENT RAD THERAPY FIELDS: CPT | Performed by: RADIOLOGY

## 2020-12-04 PROCEDURE — 77336 RADIATION PHYSICS CONSULT: CPT | Performed by: RADIOLOGY

## 2020-12-07 ENCOUNTER — HOSPITAL ENCOUNTER (OUTPATIENT)
Dept: RADIATION ONCOLOGY | Age: 80
Discharge: HOME | End: 2020-12-07
Attending: RADIOLOGY
Payer: MEDICARE

## 2020-12-07 PROCEDURE — 77386 HC NTSTY MODUL RAD TX DLVR CPLX: CPT | Performed by: RADIOLOGY

## 2020-12-07 PROCEDURE — 77014 PR CT GUIDANCE PLACEMENT RAD THERAPY FIELDS: CPT | Performed by: RADIOLOGY

## 2020-12-08 ENCOUNTER — HOSPITAL ENCOUNTER (OUTPATIENT)
Dept: RADIATION ONCOLOGY | Age: 80
Discharge: HOME | End: 2020-12-08
Attending: RADIOLOGY
Payer: MEDICARE

## 2020-12-08 PROCEDURE — 77386 HC NTSTY MODUL RAD TX DLVR CPLX: CPT | Performed by: RADIOLOGY

## 2020-12-08 PROCEDURE — 77014 PR CT GUIDANCE PLACEMENT RAD THERAPY FIELDS: CPT | Performed by: RADIOLOGY

## 2020-12-09 ENCOUNTER — HOSPITAL ENCOUNTER (OUTPATIENT)
Dept: RADIATION ONCOLOGY | Age: 80
Discharge: HOME | End: 2020-12-09
Attending: RADIOLOGY
Payer: MEDICARE

## 2020-12-09 VITALS
DIASTOLIC BLOOD PRESSURE: 78 MMHG | RESPIRATION RATE: 18 BRPM | HEART RATE: 68 BPM | WEIGHT: 148.7 LBS | TEMPERATURE: 97.8 F | BODY MASS INDEX: 19.35 KG/M2 | SYSTOLIC BLOOD PRESSURE: 132 MMHG

## 2020-12-09 PROCEDURE — 77386 HC NTSTY MODUL RAD TX DLVR CPLX: CPT | Performed by: RADIOLOGY

## 2020-12-09 PROCEDURE — 77014 PR CT GUIDANCE PLACEMENT RAD THERAPY FIELDS: CPT | Performed by: RADIOLOGY

## 2020-12-09 NOTE — PROGRESS NOTES
Andrea Payton  12/9/2020  Wt Readings from Last 3 Encounters:   12/09/20 148 lb 11.2 oz (67.4 kg)   12/02/20 147 lb 14.4 oz (67.1 kg)   10/14/20 150 lb 3.2 oz (68.1 kg)     Body mass index is 19.35 kg/m². Treatment Area: itv lingula sbrt    Patient was seen today for weekly visit. Comfort Alteration  KPS:70%  Fatigue: Moderate    Ventilation Alterations  Cough: No  Hemoptysis: No  Mucus Color: clear  Dyspnea: No  O2 Sat: 70%  Pt states that his saturation is always low    Nutritional Alteration  Anorexia: No  Nausea: No   Vomiting: No     Skin Alteration   Sensation: instructed to use lotion, dry to area    Radiation Dermatitis:  Same as above    Mucous Membrane Alteration  Voice Changes/ Stridor/Larynx: no  Pharynx & Esophagus: na    Elimination Alterations  Constipation: no  Diarrhea:  no      Emotional  Coping: effective      Injury, potential bleeding or infection: na    Other:na    Lab Results   Component Value Date    WBC 4.0 (L) 09/22/2020     (L) 09/22/2020         /78   Pulse 68   Temp 97.8 °F (36.6 °C) (Skin)   Resp 18   Wt 148 lb 11.2 oz (67.4 kg)   BMI 19.35 kg/m²   BP within normal range?  yes          Assessment/Plan:   8/10fx     4000/5000cGY    Cristóbal Hernandez

## 2020-12-09 NOTE — PROGRESS NOTES
Julius Payton  12/9/2020  2:27 PM      No chief complaint on file. Wt Readings from Last 3 Encounters:   12/09/20 148 lb 11.2 oz (67.4 kg)   12/02/20 147 lb 14.4 oz (67.1 kg)   10/14/20 150 lb 3.2 oz (68.1 kg)       Comments: Dose 4000 cGy with SBRT to the lung. Patient is doing very well without any complaints. He still does have some shortness of breath. He has no cough or hemoptysis. He has no dysphagia. He has no headache nausea or vomiting. On examination the skin over the treated area looks good.     Patient is tolerating treatment well      Plan: Radiation to continue as planned      Electronically signed by Heidy Barrientos MD on 12/9/20 at 2:27 PM EST

## 2020-12-10 ENCOUNTER — HOSPITAL ENCOUNTER (OUTPATIENT)
Dept: RADIATION ONCOLOGY | Age: 80
Discharge: HOME | End: 2020-12-10
Attending: RADIOLOGY
Payer: MEDICARE

## 2020-12-10 PROCEDURE — 77014 PR CT GUIDANCE PLACEMENT RAD THERAPY FIELDS: CPT | Performed by: RADIOLOGY

## 2020-12-10 PROCEDURE — 77386 HC NTSTY MODUL RAD TX DLVR CPLX: CPT | Performed by: RADIOLOGY

## 2020-12-11 ENCOUNTER — HOSPITAL ENCOUNTER (OUTPATIENT)
Dept: RADIATION ONCOLOGY | Age: 80
Discharge: HOME | End: 2020-12-11
Attending: RADIOLOGY
Payer: MEDICARE

## 2020-12-11 PROCEDURE — 77014 PR CT GUIDANCE PLACEMENT RAD THERAPY FIELDS: CPT | Performed by: RADIOLOGY

## 2020-12-11 PROCEDURE — 77336 RADIATION PHYSICS CONSULT: CPT | Performed by: RADIOLOGY

## 2020-12-11 PROCEDURE — 77386 HC NTSTY MODUL RAD TX DLVR CPLX: CPT | Performed by: RADIOLOGY

## 2020-12-11 PROCEDURE — 77427 RADIATION TX MANAGEMENT X5: CPT | Performed by: RADIOLOGY

## 2020-12-11 NOTE — PROGRESS NOTES
Barak Carvajalchris Payton  12/11/2020  7:29 AM          Current Outpatient Medications   Medication Sig Dispense Refill    Cholecalciferol (VITAMIN D3) 50 MCG (2000 UT) TABS Take 2,000 Units by mouth daily      tamsulosin (FLOMAX) 0.4 MG capsule Take 0.4 mg by mouth 4 times daily as needed      baclofen (LIORESAL) 20 MG tablet Take 20 mg by mouth nightly      phenazopyridine (PYRIDIUM) 200 MG tablet Take 1 tablet by mouth 2 times daily as needed for Pain 10 tablet 2    solifenacin (VESICARE) 10 MG tablet Take 10 mg by mouth every other day   0    budesonide-formoterol (SYMBICORT) 160-4.5 MCG/ACT AERO Inhale 2 puffs into the lungs 2 times daily 1 Inhaler 6    albuterol (PROVENTIL) (2.5 MG/3ML) 0.083% nebulizer solution Take 3 mLs by nebulization every 6 hours as needed for Wheezing DX: COPD J44.9, Lung Cancer (Patient not taking: Reported on 10/14/2020) 120 each 3    amLODIPine (NORVASC) 5 MG tablet Take 5 mg by mouth daily  0     No current facility-administered medications for this encounter. This is an up-to-date medication list.    Please take this list to your next care provider, and discard any previous medication lists.

## 2020-12-31 ENCOUNTER — APPOINTMENT (OUTPATIENT)
Dept: RADIATION ONCOLOGY | Age: 80
End: 2020-12-31
Attending: RADIOLOGY
Payer: MEDICARE

## 2021-01-06 ENCOUNTER — HOSPITAL ENCOUNTER (OUTPATIENT)
Dept: RADIATION ONCOLOGY | Age: 81
Discharge: HOME | End: 2021-01-06
Attending: RADIOLOGY
Payer: MEDICARE

## 2021-01-06 VITALS
WEIGHT: 146.2 LBS | HEART RATE: 86 BPM | TEMPERATURE: 97.1 F | RESPIRATION RATE: 18 BRPM | SYSTOLIC BLOOD PRESSURE: 138 MMHG | BODY MASS INDEX: 19.03 KG/M2 | OXYGEN SATURATION: 81 % | DIASTOLIC BLOOD PRESSURE: 68 MMHG

## 2021-01-06 DIAGNOSIS — C34.82 MALIGNANT NEOPLASM OF OVERLAPPING SITES OF LEFT LUNG (HCC): Primary | ICD-10-CM

## 2021-01-06 DIAGNOSIS — C34.12 MALIGNANT NEOPLASM OF LINGULA OF LEFT LUNG (HCC): ICD-10-CM

## 2021-01-06 DIAGNOSIS — C34.12 MALIGNANT NEOPLASM OF LINGULA OF LEFT LUNG (HCC): Primary | ICD-10-CM

## 2021-01-06 DIAGNOSIS — Z01.812 BLOOD TESTS PRIOR TO TREATMENT OR PROCEDURE: ICD-10-CM

## 2021-01-06 DIAGNOSIS — C34.12 MALIGNANT NEOPLASM OF UPPER LOBE OF LEFT LUNG (HCC): ICD-10-CM

## 2021-01-06 DIAGNOSIS — R91.8 MASS OF LINGULA OF LUNG: ICD-10-CM

## 2021-01-06 PROCEDURE — 99999 PR OFFICE/OUTPT VISIT,PROCEDURE ONLY: CPT | Performed by: NURSE PRACTITIONER

## 2021-01-06 NOTE — PROGRESS NOTES
Alexandra Altamiranos  1/6/2021  1:35 PM      Vitals:    01/06/21 1329   BP: 138/68   Pulse: 86   Resp: 18   Temp: 97.1 °F (36.2 °C)   SpO2: (!) 81%    : Wt Readings from Last 3 Encounters:   01/06/21 146 lb 3.2 oz (66.3 kg)   12/09/20 148 lb 11.2 oz (67.4 kg)   12/02/20 147 lb 14.4 oz (67.1 kg)                Current Outpatient Medications:     Cholecalciferol (VITAMIN D3) 50 MCG (2000 UT) TABS, Take 2,000 Units by mouth daily, Disp: , Rfl:     tamsulosin (FLOMAX) 0.4 MG capsule, Take 0.4 mg by mouth 4 times daily as needed, Disp: , Rfl:     baclofen (LIORESAL) 20 MG tablet, Take 20 mg by mouth nightly, Disp: , Rfl:     phenazopyridine (PYRIDIUM) 200 MG tablet, Take 1 tablet by mouth 2 times daily as needed for Pain, Disp: 10 tablet, Rfl: 2    solifenacin (VESICARE) 10 MG tablet, Take 10 mg by mouth every other day , Disp: , Rfl: 0    budesonide-formoterol (SYMBICORT) 160-4.5 MCG/ACT AERO, Inhale 2 puffs into the lungs 2 times daily, Disp: 1 Inhaler, Rfl: 6    albuterol (PROVENTIL) (2.5 MG/3ML) 0.083% nebulizer solution, Take 3 mLs by nebulization every 6 hours as needed for Wheezing DX: COPD J44.9, Lung Cancer (Patient not taking: Reported on 10/14/2020), Disp: 120 each, Rfl: 3    amLODIPine (NORVASC) 5 MG tablet, Take 5 mg by mouth daily, Disp: , Rfl: 0      Patient is seen today in follow up for ITV lingula SBRT with Holger Bautista NP. Completed treatment 12/11/2020 and patient has lost weight and is refusing to wear O2 even though his oximeter is reading 81%. His sister is with him and he is very discouraged and feels wearing the oxygen is a waste of his time and feels nothing from using it. Encouraged to use it and he gets very agitated with the thoughts of it.         FALLS RISK SCREENING ASSESSMENT Higher High Risk 1. Place patient in easily observable treatment room  2. Patient attended at all times by family member or staff  3. Provide assistance as indicated for ambulation activities  4. Reorient confused/cognitively impaired patient  5. Call-light/bell within patient's reach  6. Chair/bed in low position, stretcher/bed with siderails up except when performing patient care activities  7. Educate patient/family/caregiver on falls prevention  8. Falls risk precaution (Yellow sticker Level III) placed on patient chart           MALNUTRITION RISK SCREENING ASSESSMENT    1/6/2021   Patient:  Mouna Mathew  Sex:  male    Instructions:  Assess the patient and enter the appropriate indicators that are present for nutrition risk identification. Total the numbers entered and assign a risk score. Follow the appropriate action for total score listed below. Assessment   Date  1/6/2021     1. Have you lost weight without trying? 2- Unsure     2. Have you been eating poorly because of a decreased appetite? 0- No   3. Do you have a diagnosis of head and neck cancer?       0- No                                                                                    TOTAL 2          Score of 0-1: No action  Score 2 or greater:  · For Non-Diabetic Patient: Recommend adding Ensure Complete 2 x daily and provide patient with Ensure wellness bag with coupons  · For Diabetic Patient: Recommend adding Glucerna Shake 2 x daily and provide patient with Glucerna Wellness bag with coupons  · Route to the dietitian via [address] [place] [address]

## 2021-01-06 NOTE — PROGRESS NOTES
RADIATION ONCOLOGY  4 week follow up         1/7/2021      NAME:  Vidal Payton    YOB: 1940    CC: Pt returns today for re-assessment of radiation treatment area. Diagnosis:  cT1 left lingula lung CA, metachronous primary     Subjective: Lyubov Ramirez completed SBRT directed to left lingula lung CA on 12/11/2020. Received 5000 cGy/ 10 Fx. Lyubov Ramirez who likes to be called \"Federico\" is seen today in post SBRT completion symptom management. He is accompanied by his sister Kevyn Huerta into exam room. The patient denies skin complaints or pain complaints to treatment site. No chest pain, pleuritic pain, wheezing or dyspnea at rest. He reports dyspnea with moderate exertion at baseline no progression. Sputum productive cough white mucous  ongoing, unchanged. No hemoptysis. The patient is not wearing oxygen as prescribed at home or utilizing portable tank when leaving house. On initial check today his pulse oximetry reading 81% per oncology RN check. Of note the patient's fingers were cold on arrival, later recheck pulse oximetry reading 89-90% on RA. The patient is using his Rx nebulizer at home but he never filled and/or received Rx Symbicort inhaler. The patient medical history includes prostate cancer, post XRT 04/08/2009. The patient continues to complain of nocturia. No dysuria or hematuria. No flank pain, fevers or chills. Most recent PSA: 0.46- WNL on 09/22/2020 per EMR. Patient is following with:    Primary Care - Dr. Selena Anaya. Appt March 2021. Urology- Dr. Michael Loyd. Appt March 2021. Pulmonary - Dr. Briana Mccoy. No follow-up scheduled at this time. Colon Laura Conti Fraction last visit 08/28/2019. Verified today again patient is not following with Medical Oncology (per patient's choice) and states further he will not receive chemo/ immunotherapy if clinically indicated. Pain: No pain complaints. Past medical, surgical, social and family histories reviewed and updated as indicated. Significant Medical History-  · Prostate cancer. Post XRT completion 04/08/2009. Follows with Dr. Patricia Morris. · 60 pack years tobacco cigarette smoking, quit 07/11/2019   · COPD   · MICHELA lung nodule: post bronchoscopy with biopsy, 04/04/2019. Biopsy: invasive poorly differentiated squamous cell carcinoma. PD-L1 status 62%. · Medically unresectable. · SBRT to MICHELA lung nodule completed, 07/17/2019 (5000 cGy/ 5 Fx). · Mass of lingula of left lung noted on CT chest, 03/17/2020. 3.7 x 4.4 cm. · PET avid lesion, peak SUV 29.6 on PET/CT 06/29/2020. Increased size 5 x 4.4 cm. ALLERGIES:  Codeine         Current Outpatient Medications   Medication Sig Dispense Refill    Cholecalciferol (VITAMIN D3) 50 MCG (2000 UT) TABS Take 2,000 Units by mouth daily      tamsulosin (FLOMAX) 0.4 MG capsule Take 0.4 mg by mouth 4 times daily as needed      baclofen (LIORESAL) 20 MG tablet Take 20 mg by mouth nightly      solifenacin (VESICARE) 10 MG tablet Take 10 mg by mouth every other day   0    albuterol (PROVENTIL) (2.5 MG/3ML) 0.083% nebulizer solution Take 3 mLs by nebulization every 6 hours as needed for Wheezing DX: COPD J44.9, Lung Cancer 120 each 3    amLODIPine (NORVASC) 5 MG tablet Take 5 mg by mouth daily  0    budesonide-formoterol (SYMBICORT) 160-4.5 MCG/ACT AERO Inhale 2 puffs into the lungs 2 times daily (Patient not taking: Reported on 1/6/2021) 1 Inhaler 6     No current facility-administered medications for this encounter. Physical Examination:     Vitals:    01/06/21 1329   BP: 138/68   Pulse: 86   Resp: 18   Temp: 97.1 °F (36.2 °C)   SpO2: (!) 81%     SpO2: 89-90% room air    Wt Readings from Last 3 Encounters:   01/06/21 146 lb 3.2 oz (66.3 kg)   12/09/20 148 lb 11.2 oz (67.4 kg)   12/02/20 147 lb 14.4 oz (67.1 kg)       Alert and fully ambulatory. Pleasant and conversant. Irradiated skin shows no change. Lungs diminished breath sound. No wheezes, rhonchi or crackles. Breathing non labored, no accessory muscle use. No tachypnea. ASSESSMENT/PLAN:     Patient completed SBRT to lingula of left lung on 12/11/2020. PET avid mass with biopsy confirmed SCC to MICHELA in recent past.     Medical noncompliance with oxygen therapy and follow-up visits with all specialist. Discussed need for Medical compliance with Oxygen therapy and need to follow with all specialist. Cady Richeys says she will call to schedule Pulmonary follow-up visit for patient. NCCN guidelines NSCLC version 2.2021: (primary Tx included RT) H+P + chest CT +/- contrast every 3-6 mo for 3 yr, then chest CT +/- contrast every 6 month for 2 years then a low dose non contrast enhanced chest CT annually. Residual or new radiographic abnormalities may require more frequent imaging. Chest CT to be completed between dates March 12-20, 2021 (order placed). Radiation Oncology follow-up visit 3 months (1 week after surveillance imaging completed). The patient was given our contact number in the event that if at any time they change their mind and would like to return to the clinic to see either myself or one of the Radiation Oncologists, they can simply call us and we would be happy to see them sooner. Thank you for involving us in the management of this extremely pleasant patient. More than 15 min was in direct contact with pt coordinating/giving care. >50% of the visit was spent in counseling the pt on the following: Follow up care    Questions answered to apparent satisfaction.       Malcolm Quarles, MSN, APRN-CNP  Certified Nurse Practitioner for [address] [place] [address]   Phone: [phone]/ Fax: [phone]

## 2021-02-16 ENCOUNTER — APPOINTMENT (OUTPATIENT)
Dept: CT IMAGING | Age: 81
DRG: 175 | End: 2021-02-16
Payer: MEDICARE

## 2021-02-16 ENCOUNTER — HOSPITAL ENCOUNTER (INPATIENT)
Age: 81
LOS: 10 days | Discharge: NURSING, REHAB OR LONG-TERM CARE | DRG: 175 | End: 2021-02-26
Attending: EMERGENCY MEDICINE | Admitting: FAMILY MEDICINE
Payer: MEDICARE

## 2021-02-16 ENCOUNTER — APPOINTMENT (OUTPATIENT)
Dept: GENERAL RADIOLOGY | Age: 81
DRG: 175 | End: 2021-02-16
Payer: MEDICARE

## 2021-02-16 DIAGNOSIS — I71.40 AAA (ABDOMINAL AORTIC ANEURYSM) WITHOUT RUPTURE: ICD-10-CM

## 2021-02-16 DIAGNOSIS — N30.01 ACUTE CYSTITIS WITH HEMATURIA: ICD-10-CM

## 2021-02-16 DIAGNOSIS — E87.20 LACTIC ACIDOSIS: ICD-10-CM

## 2021-02-16 DIAGNOSIS — N17.9 AKI (ACUTE KIDNEY INJURY) (HCC): ICD-10-CM

## 2021-02-16 DIAGNOSIS — I26.94 MULTIPLE SUBSEGMENTAL PULMONARY EMBOLI WITHOUT ACUTE COR PULMONALE (HCC): Primary | ICD-10-CM

## 2021-02-16 DIAGNOSIS — R74.01 TRANSAMINITIS: ICD-10-CM

## 2021-02-16 DIAGNOSIS — R91.8 MASS OF LINGULA OF LUNG: ICD-10-CM

## 2021-02-16 PROBLEM — I26.99 PULMONARY EMBOLISM (HCC): Status: ACTIVE | Noted: 2021-02-16

## 2021-02-16 LAB
ALBUMIN SERPL-MCNC: 3.6 G/DL (ref 3.5–5.2)
ALP BLD-CCNC: 81 U/L (ref 40–129)
ALT SERPL-CCNC: 667 U/L (ref 0–40)
ANION GAP SERPL CALCULATED.3IONS-SCNC: 13 MMOL/L (ref 7–16)
ANISOCYTOSIS: ABNORMAL
AST SERPL-CCNC: 794 U/L (ref 0–39)
B.E.: -2.5 MMOL/L (ref -3–3)
BACTERIA: ABNORMAL /HPF
BASOPHILS ABSOLUTE: 0.04 E9/L (ref 0–0.2)
BASOPHILS RELATIVE PERCENT: 0.6 % (ref 0–2)
BILIRUB SERPL-MCNC: 1.3 MG/DL (ref 0–1.2)
BILIRUBIN URINE: ABNORMAL
BLOOD, URINE: ABNORMAL
BUN BLDV-MCNC: 43 MG/DL (ref 8–23)
BURR CELLS: ABNORMAL
CALCIUM SERPL-MCNC: 8.3 MG/DL (ref 8.6–10.2)
CHLORIDE BLD-SCNC: 101 MMOL/L (ref 98–107)
CLARITY: CLEAR
CO2: 26 MMOL/L (ref 22–29)
COHB: 1.7 % (ref 0–1.5)
COLOR: YELLOW
CREAT SERPL-MCNC: 1.6 MG/DL (ref 0.7–1.2)
CRITICAL: ABNORMAL
D DIMER: >5250 NG/ML DDU
DATE ANALYZED: ABNORMAL
DATE OF COLLECTION: ABNORMAL
EOSINOPHILS ABSOLUTE: 0 E9/L (ref 0.05–0.5)
EOSINOPHILS RELATIVE PERCENT: 0 % (ref 0–6)
EPITHELIAL CELLS, UA: ABNORMAL /HPF
GFR AFRICAN AMERICAN: 50
GFR NON-AFRICAN AMERICAN: 50 ML/MIN/1.73
GLUCOSE BLD-MCNC: 137 MG/DL (ref 74–99)
GLUCOSE URINE: NEGATIVE MG/DL
HCO3: 24.6 MMOL/L (ref 22–26)
HCT VFR BLD CALC: 42.2 % (ref 37–54)
HEMOGLOBIN: 12.4 G/DL (ref 12.5–16.5)
HHB: 27.3 % (ref 0–5)
IMMATURE GRANULOCYTES #: 0.26 E9/L
IMMATURE GRANULOCYTES %: 3.8 % (ref 0–5)
KETONES, URINE: NEGATIVE MG/DL
LAB: ABNORMAL
LACTIC ACID, SEPSIS: 3.2 MMOL/L (ref 0.5–1.9)
LACTIC ACID, SEPSIS: 9.2 MMOL/L (ref 0.5–1.9)
LEUKOCYTE ESTERASE, URINE: ABNORMAL
LYMPHOCYTES ABSOLUTE: 0.28 E9/L (ref 1.5–4)
LYMPHOCYTES RELATIVE PERCENT: 4.1 % (ref 20–42)
Lab: ABNORMAL
MCH RBC QN AUTO: 25.8 PG (ref 26–35)
MCHC RBC AUTO-ENTMCNC: 29.4 % (ref 32–34.5)
MCV RBC AUTO: 87.7 FL (ref 80–99.9)
METER GLUCOSE: 105 MG/DL (ref 74–99)
METHB: 0.3 % (ref 0–1.5)
MODE: ABNORMAL
MONOCYTES ABSOLUTE: 0.94 E9/L (ref 0.1–0.95)
MONOCYTES RELATIVE PERCENT: 13.7 % (ref 2–12)
NEUTROPHILS ABSOLUTE: 5.35 E9/L (ref 1.8–7.3)
NEUTROPHILS RELATIVE PERCENT: 77.8 % (ref 43–80)
NITRITE, URINE: NEGATIVE
O2 CONTENT: 11.9 ML/DL
O2 SATURATION: 72.1 % (ref 92–98.5)
O2HB: 70.7 % (ref 94–97)
OPERATOR ID: 7490
OVALOCYTES: ABNORMAL
PATIENT TEMP: 37 C
PCO2: 52.5 MMHG (ref 35–45)
PDW BLD-RTO: 22.7 FL (ref 11.5–15)
PH BLOOD GAS: 7.29 (ref 7.35–7.45)
PH UA: 5.5 (ref 5–9)
PLATELET # BLD: 123 E9/L (ref 130–450)
PMV BLD AUTO: ABNORMAL FL (ref 7–12)
PO2: 43.5 MMHG (ref 75–100)
POIKILOCYTES: ABNORMAL
POLYCHROMASIA: ABNORMAL
POTASSIUM REFLEX MAGNESIUM: 4.8 MMOL/L (ref 3.5–5)
PROTEIN UA: 100 MG/DL
RBC # BLD: 4.81 E12/L (ref 3.8–5.8)
RBC UA: ABNORMAL /HPF (ref 0–2)
REASON FOR REJECTION: NORMAL
REJECTED TEST: NORMAL
SARS-COV-2, NAAT: NOT DETECTED
SODIUM BLD-SCNC: 140 MMOL/L (ref 132–146)
SOURCE, BLOOD GAS: ABNORMAL
SPECIFIC GRAVITY UA: >=1.03 (ref 1–1.03)
TARGET CELLS: ABNORMAL
THB: 12 G/DL (ref 11.5–16.5)
TIME ANALYZED: 1643
TOTAL PROTEIN: 7.3 G/DL (ref 6.4–8.3)
TROPONIN: 0.03 NG/ML (ref 0–0.03)
UROBILINOGEN, URINE: 2 E.U./DL
WBC # BLD: 6.9 E9/L (ref 4.5–11.5)
WBC UA: ABNORMAL /HPF (ref 0–5)

## 2021-02-16 PROCEDURE — 6360000004 HC RX CONTRAST MEDICATION: Performed by: RADIOLOGY

## 2021-02-16 PROCEDURE — 87635 SARS-COV-2 COVID-19 AMP PRB: CPT

## 2021-02-16 PROCEDURE — 71045 X-RAY EXAM CHEST 1 VIEW: CPT

## 2021-02-16 PROCEDURE — 74177 CT ABD & PELVIS W/CONTRAST: CPT

## 2021-02-16 PROCEDURE — 83605 ASSAY OF LACTIC ACID: CPT

## 2021-02-16 PROCEDURE — 70450 CT HEAD/BRAIN W/O DYE: CPT

## 2021-02-16 PROCEDURE — 2060000000 HC ICU INTERMEDIATE R&B

## 2021-02-16 PROCEDURE — 84484 ASSAY OF TROPONIN QUANT: CPT

## 2021-02-16 PROCEDURE — 99284 EMERGENCY DEPT VISIT MOD MDM: CPT

## 2021-02-16 PROCEDURE — 80053 COMPREHEN METABOLIC PANEL: CPT

## 2021-02-16 PROCEDURE — 6360000002 HC RX W HCPCS: Performed by: STUDENT IN AN ORGANIZED HEALTH CARE EDUCATION/TRAINING PROGRAM

## 2021-02-16 PROCEDURE — 85378 FIBRIN DEGRADE SEMIQUANT: CPT

## 2021-02-16 PROCEDURE — 85025 COMPLETE CBC W/AUTO DIFF WBC: CPT

## 2021-02-16 PROCEDURE — 2580000003 HC RX 258: Performed by: STUDENT IN AN ORGANIZED HEALTH CARE EDUCATION/TRAINING PROGRAM

## 2021-02-16 PROCEDURE — 93005 ELECTROCARDIOGRAM TRACING: CPT | Performed by: STUDENT IN AN ORGANIZED HEALTH CARE EDUCATION/TRAINING PROGRAM

## 2021-02-16 PROCEDURE — 87077 CULTURE AEROBIC IDENTIFY: CPT

## 2021-02-16 PROCEDURE — 82962 GLUCOSE BLOOD TEST: CPT

## 2021-02-16 PROCEDURE — 96365 THER/PROPH/DIAG IV INF INIT: CPT

## 2021-02-16 PROCEDURE — 87088 URINE BACTERIA CULTURE: CPT

## 2021-02-16 PROCEDURE — 87040 BLOOD CULTURE FOR BACTERIA: CPT

## 2021-02-16 PROCEDURE — 82805 BLOOD GASES W/O2 SATURATION: CPT

## 2021-02-16 PROCEDURE — 71275 CT ANGIOGRAPHY CHEST: CPT

## 2021-02-16 PROCEDURE — 87186 SC STD MICRODIL/AGAR DIL: CPT

## 2021-02-16 PROCEDURE — 81001 URINALYSIS AUTO W/SCOPE: CPT

## 2021-02-16 PROCEDURE — 87150 DNA/RNA AMPLIFIED PROBE: CPT

## 2021-02-16 RX ORDER — ACETAMINOPHEN 650 MG/1
650 SUPPOSITORY RECTAL EVERY 6 HOURS PRN
Status: DISCONTINUED | OUTPATIENT
Start: 2021-02-16 | End: 2021-02-17

## 2021-02-16 RX ORDER — ALBUTEROL SULFATE 2.5 MG/3ML
2.5 SOLUTION RESPIRATORY (INHALATION) EVERY 6 HOURS PRN
Status: DISCONTINUED | OUTPATIENT
Start: 2021-02-16 | End: 2021-02-26 | Stop reason: HOSPADM

## 2021-02-16 RX ORDER — SODIUM CHLORIDE 0.9 % (FLUSH) 0.9 %
10 SYRINGE (ML) INJECTION ONCE
Status: DISCONTINUED | OUTPATIENT
Start: 2021-02-16 | End: 2021-02-16

## 2021-02-16 RX ORDER — AMLODIPINE BESYLATE 5 MG/1
5 TABLET ORAL DAILY
Status: DISCONTINUED | OUTPATIENT
Start: 2021-02-17 | End: 2021-02-26 | Stop reason: HOSPADM

## 2021-02-16 RX ORDER — BACLOFEN 10 MG/1
20 TABLET ORAL NIGHTLY
Status: DISCONTINUED | OUTPATIENT
Start: 2021-02-16 | End: 2021-02-26 | Stop reason: HOSPADM

## 2021-02-16 RX ORDER — BUDESONIDE 0.5 MG/2ML
0.5 INHALANT ORAL 2 TIMES DAILY
Status: DISCONTINUED | OUTPATIENT
Start: 2021-02-16 | End: 2021-02-26 | Stop reason: HOSPADM

## 2021-02-16 RX ORDER — SODIUM CHLORIDE 0.9 % (FLUSH) 0.9 %
10 SYRINGE (ML) INJECTION PRN
Status: DISCONTINUED | OUTPATIENT
Start: 2021-02-16 | End: 2021-02-26 | Stop reason: HOSPADM

## 2021-02-16 RX ORDER — TROSPIUM CHLORIDE 20 MG/1
20 TABLET, FILM COATED ORAL
Status: DISCONTINUED | OUTPATIENT
Start: 2021-02-17 | End: 2021-02-26 | Stop reason: HOSPADM

## 2021-02-16 RX ORDER — ARFORMOTEROL TARTRATE 15 UG/2ML
15 SOLUTION RESPIRATORY (INHALATION) 2 TIMES DAILY
Status: DISCONTINUED | OUTPATIENT
Start: 2021-02-16 | End: 2021-02-26 | Stop reason: HOSPADM

## 2021-02-16 RX ORDER — ACETAMINOPHEN 325 MG/1
650 TABLET ORAL EVERY 6 HOURS PRN
Status: DISCONTINUED | OUTPATIENT
Start: 2021-02-16 | End: 2021-02-17

## 2021-02-16 RX ORDER — BUDESONIDE AND FORMOTEROL FUMARATE DIHYDRATE 160; 4.5 UG/1; UG/1
2 AEROSOL RESPIRATORY (INHALATION) 2 TIMES DAILY
Status: DISCONTINUED | OUTPATIENT
Start: 2021-02-16 | End: 2021-02-16 | Stop reason: CLARIF

## 2021-02-16 RX ORDER — 0.9 % SODIUM CHLORIDE 0.9 %
500 INTRAVENOUS SOLUTION INTRAVENOUS ONCE
Status: COMPLETED | OUTPATIENT
Start: 2021-02-16 | End: 2021-02-16

## 2021-02-16 RX ORDER — PROMETHAZINE HYDROCHLORIDE 25 MG/1
12.5 TABLET ORAL EVERY 6 HOURS PRN
Status: DISCONTINUED | OUTPATIENT
Start: 2021-02-16 | End: 2021-02-26 | Stop reason: HOSPADM

## 2021-02-16 RX ORDER — SODIUM CHLORIDE 0.9 % (FLUSH) 0.9 %
10 SYRINGE (ML) INJECTION EVERY 12 HOURS SCHEDULED
Status: DISCONTINUED | OUTPATIENT
Start: 2021-02-16 | End: 2021-02-26 | Stop reason: HOSPADM

## 2021-02-16 RX ORDER — SODIUM CHLORIDE 9 MG/ML
INJECTION, SOLUTION INTRAVENOUS CONTINUOUS
Status: DISCONTINUED | OUTPATIENT
Start: 2021-02-16 | End: 2021-02-19

## 2021-02-16 RX ORDER — ONDANSETRON 2 MG/ML
4 INJECTION INTRAMUSCULAR; INTRAVENOUS EVERY 6 HOURS PRN
Status: DISCONTINUED | OUTPATIENT
Start: 2021-02-16 | End: 2021-02-26 | Stop reason: HOSPADM

## 2021-02-16 RX ORDER — CHOLECALCIFEROL (VITAMIN D3) 50 MCG
2000 TABLET ORAL DAILY
Status: DISCONTINUED | OUTPATIENT
Start: 2021-02-17 | End: 2021-02-26 | Stop reason: HOSPADM

## 2021-02-16 RX ORDER — POLYETHYLENE GLYCOL 3350 17 G/17G
17 POWDER, FOR SOLUTION ORAL DAILY PRN
Status: DISCONTINUED | OUTPATIENT
Start: 2021-02-16 | End: 2021-02-22

## 2021-02-16 RX ADMIN — PIPERACILLIN AND TAZOBACTAM 3375 MG: 3; .375 INJECTION, POWDER, LYOPHILIZED, FOR SOLUTION INTRAVENOUS; PARENTERAL at 20:30

## 2021-02-16 RX ADMIN — IOPAMIDOL 90 ML: 755 INJECTION, SOLUTION INTRAVENOUS at 19:35

## 2021-02-16 RX ADMIN — SODIUM CHLORIDE 500 ML: 9 INJECTION, SOLUTION INTRAVENOUS at 16:55

## 2021-02-16 ASSESSMENT — ENCOUNTER SYMPTOMS
DIARRHEA: 0
WHEEZING: 0
CONSTIPATION: 0
SHORTNESS OF BREATH: 1
TROUBLE SWALLOWING: 0
VOMITING: 0
COUGH: 1
NAUSEA: 0
ABDOMINAL PAIN: 0

## 2021-02-16 NOTE — ED PROVIDER NOTES
Appearance: He is not ill-appearing or diaphoretic. Comments: Thin, elderly -American male laying in bed comfortably. He is in mild respiratory distress, tachypneic, with accessory muscle use   HENT:      Head: Normocephalic and atraumatic. Right Ear: External ear normal.      Left Ear: External ear normal.      Nose: Nose normal.      Mouth/Throat:      Mouth: Mucous membranes are moist.   Eyes:      Extraocular Movements: Extraocular movements intact. Conjunctiva/sclera: Conjunctivae normal.      Pupils: Pupils are equal, round, and reactive to light. Neck:      Musculoskeletal: Normal range of motion and neck supple. Cardiovascular:      Rate and Rhythm: Normal rate and regular rhythm. Heart sounds: Normal heart sounds. Pulmonary:      Effort: Respiratory distress present. Breath sounds: Rales present. No wheezing or rhonchi. Comments: Mildly tachypneic, round 28 respirations per minute. Accessory muscle use with abdominal and intercostal muscle use. Patient is on 3 L of O2, his normal home oxygen. Chest:      Chest wall: No tenderness. Abdominal:      General: Abdomen is flat. There is no distension. Palpations: Abdomen is soft. Tenderness: There is no abdominal tenderness. There is no guarding or rebound. Musculoskeletal:         General: No swelling or deformity. Right lower leg: No edema. Left lower leg: No edema. Skin:     General: Skin is warm and dry. Capillary Refill: Capillary refill takes less than 2 seconds. Neurological:      General: No focal deficit present. Mental Status: He is alert and oriented to person, place, and time. Motor: No weakness. Psychiatric:         Mood and Affect: Mood normal.         Behavior: Behavior normal.         Thought Content:  Thought content normal.          Procedures     MDM  Number of Diagnoses or Management Options  AAA (abdominal aortic aneurysm) without rupture ([address] Acute cystitis with hematuria  CRISTEL (acute kidney injury) ([facility]  Lactic acidosis  Multiple subsegmental pulmonary emboli without acute cor pulmonale (HCC)  Transaminitis  Diagnosis management comments: Is an 49-year-old male with history of lung cancer, COPD, on home oxygen, presents emergency department for increasing shortness of breath. His sister is concerned of altered mental status, given concern for possible metastasis from his lung cancer, will obtain CT of head. Will evaluate for infectious or respiratory or metabolic causes of altered mental status. Work-up remarkable for bilateral subsegmental pulmonary emboli with no evidence of heart strain. Patient also has significant lactic acidosis of 9.2 with transaminitis, likely related to hypoxia. Lactic acidosis may also be related to sepsis secondary to acute cystitis. Patient does have an acute kidney injury, was given IV fluids. Patient was started on 1 mg/kg dose of Lovenox for his pulmonary emboli, started on Zosyn for treatment of his urinary tract infection, empiric coverage of possible intra-abdominal infection prior to results of CT of abdomen were resulted. Review of micro results reveal the patient has had prior urinary tract infections are resistant to ampicillin and cefazolin, there was no mention of resistance to Rocephin, however given uncertainty, severity of patient's illness, patient was started on Zosyn. Patient was incidentally found to have an abdominal aortic aneurysm as well, was made aware of this and necessity for potential further monitoring and/or treatment for this. patient will be admitted to the hospital for further treatment of his acute cystitis, CRISTEL, pulmonary emboli. Patient agreeable with this plan and all questions were answered.         ED Course as of Feb 17 0141   Tue Feb 16, 2021 Social History:  reports that he quit smoking about 19 months ago. His smoking use included cigarettes. He started smoking about 61 years ago. He has a 45.00 pack-year smoking history. He has never used smokeless tobacco. He reports that he does not drink alcohol or use drugs. Family History: family history includes Cancer in his father; Cancer (age of onset: 61) in his maternal aunt; Heart Failure in his mother. The patients home medications have been reviewed.     Allergies: Codeine    -------------------------------------------------- RESULTS -------------------------------------------------    LABS:  Results for orders placed or performed during the hospital encounter of 02/16/21   COVID-19, Rapid    Specimen: Nasopharyngeal Swab   Result Value Ref Range    SARS-CoV-2, NAAT Not Detected Not Detected   CBC Auto Differential   Result Value Ref Range    WBC 6.9 4.5 - 11.5 E9/L    RBC 4.81 3.80 - 5.80 E12/L    Hemoglobin 12.4 (L) 12.5 - 16.5 g/dL    Hematocrit 42.2 37.0 - 54.0 %    MCV 87.7 80.0 - 99.9 fL    MCH 25.8 (L) 26.0 - 35.0 pg    MCHC 29.4 (L) 32.0 - 34.5 %    RDW 22.7 (H) 11.5 - 15.0 fL    Platelets 110 (L) 341 - 450 E9/L    MPV NOT CALC 7.0 - 12.0 fL    Neutrophils % 77.8 43.0 - 80.0 %    Immature Granulocytes % 3.8 0.0 - 5.0 %    Lymphocytes % 4.1 (L) 20.0 - 42.0 %    Monocytes % 13.7 (H) 2.0 - 12.0 %    Eosinophils % 0.0 0.0 - 6.0 %    Basophils % 0.6 0.0 - 2.0 %    Neutrophils Absolute 5.35 1.80 - 7.30 E9/L    Immature Granulocytes # 0.26 E9/L    Lymphocytes Absolute 0.28 (L) 1.50 - 4.00 E9/L    Monocytes Absolute 0.94 0.10 - 0.95 E9/L    Eosinophils Absolute 0.00 (L) 0.05 - 0.50 E9/L    Basophils Absolute 0.04 0.00 - 0.20 E9/L    Anisocytosis 2+     Polychromasia 1+     Poikilocytes 2+     Somis Cells 2+     Ovalocytes 2+     Target Cells 1+    Urinalysis, reflex to microscopic   Result Value Ref Range    Color, UA Yellow Straw/Yellow    Clarity, UA Clear Clear Glucose, Ur Negative Negative mg/dL    Bilirubin Urine SMALL (A) Negative    Ketones, Urine Negative Negative mg/dL    Specific Gravity, UA >=1.030 1.005 - 1.030    Blood, Urine MODERATE (A) Negative    pH, UA 5.5 5.0 - 9.0    Protein,  (A) Negative mg/dL    Urobilinogen, Urine 2.0 (A) <2.0 E.U./dL    Nitrite, Urine Negative Negative    Leukocyte Esterase, Urine TRACE (A) Negative   Lactate, Sepsis   Result Value Ref Range    Lactic Acid, Sepsis 9.2 (HH) 0.5 - 1.9 mmol/L   Lactate, Sepsis   Result Value Ref Range    Lactic Acid, Sepsis 3.2 (H) 0.5 - 1.9 mmol/L   SPECIMEN REJECTION   Result Value Ref Range    Rejected Test dimer     Reason for Rejection see below    D-dimer, quantitative   Result Value Ref Range    D-Dimer, Quant >5250 ng/mL DDU   Comprehensive Metabolic Panel w/ Reflex to MG   Result Value Ref Range    Sodium 140 132 - 146 mmol/L    Potassium reflex Magnesium 4.8 3.5 - 5.0 mmol/L    Chloride 101 98 - 107 mmol/L    CO2 26 22 - 29 mmol/L    Anion Gap 13 7 - 16 mmol/L    Glucose 137 (H) 74 - 99 mg/dL    BUN 43 (H) 8 - 23 mg/dL    CREATININE 1.6 (H) 0.7 - 1.2 mg/dL    GFR Non-African American 50 >=60 mL/min/1.73    GFR African American 50     Calcium 8.3 (L) 8.6 - 10.2 mg/dL    Total Protein 7.3 6.4 - 8.3 g/dL    Albumin 3.6 3.5 - 5.2 g/dL    Total Bilirubin 1.3 (H) 0.0 - 1.2 mg/dL    Alkaline Phosphatase 81 40 - 129 U/L     (H) 0 - 40 U/L     (H) 0 - 39 U/L   Troponin   Result Value Ref Range    Troponin 0.03 0.00 - 0.03 ng/mL   Blood Gas, Arterial   Result Value Ref Range    Date Analyzed [id number]     Time Analyzed 1643     Source: Blood Arterial     pH, Blood Gas 7.288 (L) 7.350 - 7.450    PCO2 52.5 (H) 35.0 - 45.0 mmHg    PO2 43.5 (L) 75.0 - 100.0 mmHg    HCO3 24.6 22.0 - 26.0 mmol/L    B.E. -2.5 -3.0 - 3.0 mmol/L    O2 Sat 72.1 (L) 92.0 - 98.5 %    O2Hb 70.7 (L) 94.0 - 97.0 %    COHb 1.7 (H) 0.0 - 1.5 %    MetHb 0.3 0.0 - 1.5 %    O2 Content 11.9 mL/dL HHb 27.3 (H) 0.0 - 5.0 %    tHb (est) 12.0 11.5 - 16.5 g/dL    Mode NC-3  L     Date Of Collection      Time Collected      Pt Temp 37.0 C     ID [id number]     Lab [id number]     Critical(s) Notified . No Critical Values    Microscopic Urinalysis   Result Value Ref Range    WBC, UA 10-20 (A) 0 - 5 /HPF    RBC, UA 5-10 (A) 0 - 2 /HPF    Epithelial Cells, UA RARE /HPF    Bacteria, UA MANY (A) None Seen /HPF   POCT Glucose   Result Value Ref Range    Meter Glucose 105 (H) 74 - 99 mg/dL   EKG 12 Lead   Result Value Ref Range    Ventricular Rate 97 BPM    Atrial Rate 97 BPM    P-R Interval 162 ms    QRS Duration 82 ms    Q-T Interval 320 ms    QTc Calculation (Bazett) 406 ms    P Axis 72 degrees    R Axis 90 degrees    T Axis 49 degrees       RADIOLOGY:  CT ABDOMEN PELVIS W IV CONTRAST Additional Contrast? None   Final Result   Constipation with mild nonspecific thickening of the colonic wall. Clinical   assessment is recommended. Mild thickening of the urinary bladder. Cystitis is considered. Abdominal aortic aneurysm measuring 4.1 x 5 cm. RECOMMENDATIONS:   Abdominal aortic aneurysm. 6 month surveillance and vascular surgical   consult is recommended. CTA PULMONARY W CONTRAST   Final Result   Constipation with mild nonspecific thickening of the colonic wall. Clinical   assessment is recommended. Mild thickening of the urinary bladder. Cystitis is considered. Abdominal aortic aneurysm measuring 4.1 x 5 cm. RECOMMENDATIONS:   Abdominal aortic aneurysm. 6 month surveillance and vascular surgical   consult is recommended. CT HEAD WO CONTRAST   Final Result   1. No acute intracranial abnormality. 2. Near complete opacification of the partially visualized right maxillary   sinus. XR CHEST PORTABLE   Final Result   Advanced COPD with patchy bibasilar infiltrates concerning for pneumonia. Poor visualization of the previously noted nodule in the lingula.           EKG: This EKG is signed and interpreted by me. Rate: 97  Rhythm: Sinus  Interpretation: no acute changes  Comparison: stable as compared to patient's most recent EKG      ------------------------- NURSING NOTES AND VITALS REVIEWED ---------------------------  Date / Time Roomed:  2/16/2021  2:48 PM  ED Bed Assignment:  [id number]    The nursing notes within the ED encounter and vital signs as below have been reviewed. Patient Vitals for the past 24 hrs:   BP Temp Temp src Pulse Resp SpO2 Weight   02/16/21 2244 110/74 98.1 °F (36.7 °C) Oral 81 26 98 %    02/16/21 2130 122/79 97.6 °F (36.4 °C) Temporal 84 18 100 %    02/16/21 2021       155 lb (70.3 kg)   02/16/21 1855 114/69   87 16 98 %    02/16/21 1510 126/73         02/16/21 1509  97.5 °F (36.4 °C)  103 28 98 %        Oxygen Saturation Interpretation: Abnormal - but at baseline    ------------------------------------------ PROGRESS NOTES ------------------------------------------  Re-evaluation(s):  See ED COURSE    Counseling:  I have spoken with the patient and discussed todays results, in addition to providing specific details for the plan of care and counseling regarding the diagnosis and prognosis. Their questions are answered at this time and they are agreeable with the plan of admission.    --------------------------------- ADDITIONAL PROVIDER NOTES ---------------------------------  Consultations:  See ED COURSE  This patient's ED course included: a personal history and physicial examination, re-evaluation prior to disposition, multiple bedside re-evaluations, IV medications, cardiac monitoring, continuous pulse oximetry and complex medical decision making and emergency management    This patient has remained hemodynamically stable during their ED course. Diagnosis:  1. Multiple subsegmental pulmonary emboli without acute cor pulmonale (HCC)    2. CRISTEL (acute kidney injury) ([facility] [address]    3.  Transaminitis 4. Acute cystitis with hematuria    5. Lactic acidosis    6. AAA (abdominal aortic aneurysm) without rupture (HCC)        Disposition:  Patient's disposition: Admit to telemetry  Patient's condition is serious. [address] Kebede DO  Resident  02/17/21 1975    ATTENDING PROVIDER ATTESTATION:     Elena Rosado presented to the emergency department for evaluation of Shortness of Breath (hx of lung CA, non-compliant with 02, states he gets winded when he gets up to make food.)   and was initially evaluated by the Medical Resident. See Original ED Note for H&P and ED course above. I have reviewed and discussed the case, including pertinent history (medical, surgical, family and social) and exam findings with the Medical Resident assigned to Cassandrapoonam richmond. I have personally performed and/or participated in the history, exam, medical decision making, and procedures and agree with all pertinent clinical information. I have reviewed my findings and recommendations with the assigned Medical Resident, Elena Rosado and members of family present at the time of disposition. My findings/plan: The primary encounter diagnosis was Multiple subsegmental pulmonary emboli without acute cor pulmonale ([address] Diagnoses of CRISTEL (acute kidney injury) ([address] Transaminitis, Acute cystitis with hematuria, Lactic acidosis, and AAA (abdominal aortic aneurysm) without rupture ([address] were also pertinent to this visit.   Current Discharge Medication List        Critical Care Time: 33 minutes     MD Carol Nolasco MD  02/18/21 [id number]

## 2021-02-16 NOTE — ED NOTES
Bed: 10  Expected date:   Expected time:   Means of arrival:   Comments:  Cleve Hernandes 29, RN  02/16/21 [id number]

## 2021-02-17 ENCOUNTER — APPOINTMENT (OUTPATIENT)
Dept: ULTRASOUND IMAGING | Age: 81
DRG: 175 | End: 2021-02-17
Payer: MEDICARE

## 2021-02-17 LAB
ACINETOBACTER BAUMANNII BY PCR: NOT DETECTED
ACINETOBACTER BAUMANNII BY PCR: NOT DETECTED
ALBUMIN SERPL-MCNC: 3.5 G/DL (ref 3.5–5.2)
ALP BLD-CCNC: 94 U/L (ref 40–129)
ALT SERPL-CCNC: 1272 U/L (ref 0–40)
ANION GAP SERPL CALCULATED.3IONS-SCNC: 5 MMOL/L (ref 7–16)
ANISOCYTOSIS: ABNORMAL
AST SERPL-CCNC: 1801 U/L (ref 0–39)
BASOPHILS ABSOLUTE: 0 E9/L (ref 0–0.2)
BASOPHILS RELATIVE PERCENT: 0.7 % (ref 0–2)
BILIRUB SERPL-MCNC: 0.8 MG/DL (ref 0–1.2)
BOTTLE TYPE: ABNORMAL
BOTTLE TYPE: ABNORMAL
BUN BLDV-MCNC: 48 MG/DL (ref 8–23)
C-REACTIVE PROTEIN: 10.7 MG/DL (ref 0–0.4)
CALCIUM SERPL-MCNC: 8.4 MG/DL (ref 8.6–10.2)
CANDIDA ALBICANS BY PCR: NOT DETECTED
CANDIDA ALBICANS BY PCR: NOT DETECTED
CANDIDA GLABRATA BY PCR: NOT DETECTED
CANDIDA GLABRATA BY PCR: NOT DETECTED
CANDIDA KRUSEI BY PCR: NOT DETECTED
CANDIDA KRUSEI BY PCR: NOT DETECTED
CANDIDA PARAPSILOSIS BY PCR: NOT DETECTED
CANDIDA PARAPSILOSIS BY PCR: NOT DETECTED
CANDIDA TROPICALIS BY PCR: NOT DETECTED
CANDIDA TROPICALIS BY PCR: NOT DETECTED
CHLORIDE BLD-SCNC: 102 MMOL/L (ref 98–107)
CO2: 31 MMOL/L (ref 22–29)
CREAT SERPL-MCNC: 1.6 MG/DL (ref 0.7–1.2)
EKG ATRIAL RATE: 102 BPM
EKG ATRIAL RATE: 97 BPM
EKG P AXIS: 72 DEGREES
EKG P AXIS: 76 DEGREES
EKG P-R INTERVAL: 154 MS
EKG P-R INTERVAL: 162 MS
EKG Q-T INTERVAL: 320 MS
EKG Q-T INTERVAL: 336 MS
EKG QRS DURATION: 76 MS
EKG QRS DURATION: 82 MS
EKG QTC CALCULATION (BAZETT): 406 MS
EKG QTC CALCULATION (BAZETT): 437 MS
EKG R AXIS: 90 DEGREES
EKG R AXIS: 90 DEGREES
EKG T AXIS: 17 DEGREES
EKG T AXIS: 49 DEGREES
EKG VENTRICULAR RATE: 102 BPM
EKG VENTRICULAR RATE: 97 BPM
ENTEROBACTER CLOACAE COMPLEX BY PCR: NOT DETECTED
ENTEROBACTER CLOACAE COMPLEX BY PCR: NOT DETECTED
ENTEROBACTERALES BY PCR: NOT DETECTED
ENTEROBACTERALES BY PCR: NOT DETECTED
ENTEROCOCCUS BY PCR: NOT DETECTED
ENTEROCOCCUS BY PCR: NOT DETECTED
EOSINOPHILS ABSOLUTE: 0 E9/L (ref 0.05–0.5)
EOSINOPHILS RELATIVE PERCENT: 0 % (ref 0–6)
ESCHERICHIA COLI BY PCR: NOT DETECTED
ESCHERICHIA COLI BY PCR: NOT DETECTED
GFR AFRICAN AMERICAN: 50
GFR NON-AFRICAN AMERICAN: 50 ML/MIN/1.73
GLUCOSE BLD-MCNC: 101 MG/DL (ref 74–99)
HAEMOPHILUS INFLUENZAE BY PCR: NOT DETECTED
HAEMOPHILUS INFLUENZAE BY PCR: NOT DETECTED
HCT VFR BLD CALC: 43.1 % (ref 37–54)
HEMOGLOBIN: 12.8 G/DL (ref 12.5–16.5)
KLEBSIELLA OXYTOCA BY PCR: NOT DETECTED
KLEBSIELLA OXYTOCA BY PCR: NOT DETECTED
KLEBSIELLA PNEUMONIAE GROUP BY PCR: NOT DETECTED
KLEBSIELLA PNEUMONIAE GROUP BY PCR: NOT DETECTED
LACTIC ACID: 2 MMOL/L (ref 0.5–2.2)
LISTERIA MONOCYTOGENES BY PCR: NOT DETECTED
LISTERIA MONOCYTOGENES BY PCR: NOT DETECTED
LYMPHOCYTES ABSOLUTE: 0 E9/L (ref 1.5–4)
LYMPHOCYTES RELATIVE PERCENT: 8.5 % (ref 20–42)
MCH RBC QN AUTO: 26 PG (ref 26–35)
MCHC RBC AUTO-ENTMCNC: 29.7 % (ref 32–34.5)
MCV RBC AUTO: 87.6 FL (ref 80–99.9)
METHICILLIN RESISTANCE MECA/C  BY PCR: DETECTED
MONOCYTES ABSOLUTE: 1.31 E9/L (ref 0.1–0.95)
MONOCYTES RELATIVE PERCENT: 17.4 % (ref 2–12)
NEISSERIA MENINGITIDIS BY PCR: NOT DETECTED
NEISSERIA MENINGITIDIS BY PCR: NOT DETECTED
NEUTROPHILS ABSOLUTE: 6.39 E9/L (ref 1.8–7.3)
NEUTROPHILS RELATIVE PERCENT: 82.6 % (ref 43–80)
NUCLEATED RED BLOOD CELLS: 4.3 /100 WBC
ORDER NUMBER: ABNORMAL
ORDER NUMBER: ABNORMAL
OVALOCYTES: ABNORMAL
PDW BLD-RTO: 21.9 FL (ref 11.5–15)
PLATELET # BLD: 90 E9/L (ref 130–450)
PLATELET CONFIRMATION: NORMAL
PMV BLD AUTO: ABNORMAL FL (ref 7–12)
POIKILOCYTES: ABNORMAL
POLYCHROMASIA: ABNORMAL
POTASSIUM REFLEX MAGNESIUM: 5 MMOL/L (ref 3.5–5)
PRO-BNP: 5633 PG/ML (ref 0–450)
PROCALCITONIN: 3.13 NG/ML (ref 0–0.08)
PROTEUS SPECIES BY PCR: NOT DETECTED
PROTEUS SPECIES BY PCR: NOT DETECTED
PSEUDOMONAS AERUGINOSA BY PCR: NOT DETECTED
PSEUDOMONAS AERUGINOSA BY PCR: NOT DETECTED
RBC # BLD: 4.92 E12/L (ref 3.8–5.8)
SCHISTOCYTES: ABNORMAL
SEDIMENTATION RATE, ERYTHROCYTE: 2 MM/HR (ref 0–15)
SERRATIA MARCESCENS BY PCR: NOT DETECTED
SERRATIA MARCESCENS BY PCR: NOT DETECTED
SODIUM BLD-SCNC: 138 MMOL/L (ref 132–146)
SOURCE OF BLOOD CULTURE: ABNORMAL
SOURCE OF BLOOD CULTURE: ABNORMAL
STAPHYLOCOCCUS AUREUS BY PCR: NOT DETECTED
STAPHYLOCOCCUS AUREUS BY PCR: NOT DETECTED
STAPHYLOCOCCUS SPECIES BY PCR: DETECTED
STAPHYLOCOCCUS SPECIES BY PCR: NOT DETECTED
STREPTOCOCCUS AGALACTIAE BY PCR: NOT DETECTED
STREPTOCOCCUS AGALACTIAE BY PCR: NOT DETECTED
STREPTOCOCCUS PNEUMONIAE BY PCR: NOT DETECTED
STREPTOCOCCUS PNEUMONIAE BY PCR: NOT DETECTED
STREPTOCOCCUS PYOGENES  BY PCR: NOT DETECTED
STREPTOCOCCUS PYOGENES  BY PCR: NOT DETECTED
STREPTOCOCCUS SPECIES BY PCR: DETECTED
STREPTOCOCCUS SPECIES BY PCR: NOT DETECTED
TARGET CELLS: ABNORMAL
TOTAL PROTEIN: 7.4 G/DL (ref 6.4–8.3)
WBC # BLD: 7.7 E9/L (ref 4.5–11.5)

## 2021-02-17 PROCEDURE — 83605 ASSAY OF LACTIC ACID: CPT

## 2021-02-17 PROCEDURE — 93970 EXTREMITY STUDY: CPT

## 2021-02-17 PROCEDURE — 97166 OT EVAL MOD COMPLEX 45 MIN: CPT

## 2021-02-17 PROCEDURE — 97165 OT EVAL LOW COMPLEX 30 MIN: CPT

## 2021-02-17 PROCEDURE — 6360000002 HC RX W HCPCS: Performed by: FAMILY MEDICINE

## 2021-02-17 PROCEDURE — 87070 CULTURE OTHR SPECIMN AEROBIC: CPT

## 2021-02-17 PROCEDURE — 97162 PT EVAL MOD COMPLEX 30 MIN: CPT | Performed by: PHYSICAL THERAPIST

## 2021-02-17 PROCEDURE — 93010 ELECTROCARDIOGRAM REPORT: CPT | Performed by: INTERNAL MEDICINE

## 2021-02-17 PROCEDURE — 93970 EXTREMITY STUDY: CPT | Performed by: RADIOLOGY

## 2021-02-17 PROCEDURE — 84145 PROCALCITONIN (PCT): CPT

## 2021-02-17 PROCEDURE — 97535 SELF CARE MNGMENT TRAINING: CPT

## 2021-02-17 PROCEDURE — 87206 SMEAR FLUORESCENT/ACID STAI: CPT

## 2021-02-17 PROCEDURE — 76700 US EXAM ABDOM COMPLETE: CPT

## 2021-02-17 PROCEDURE — 85025 COMPLETE CBC W/AUTO DIFF WBC: CPT

## 2021-02-17 PROCEDURE — 87040 BLOOD CULTURE FOR BACTERIA: CPT

## 2021-02-17 PROCEDURE — 83880 ASSAY OF NATRIURETIC PEPTIDE: CPT

## 2021-02-17 PROCEDURE — 6370000000 HC RX 637 (ALT 250 FOR IP): Performed by: INTERNAL MEDICINE

## 2021-02-17 PROCEDURE — 2700000000 HC OXYGEN THERAPY PER DAY

## 2021-02-17 PROCEDURE — 87449 NOS EACH ORGANISM AG IA: CPT

## 2021-02-17 PROCEDURE — 6370000000 HC RX 637 (ALT 250 FOR IP): Performed by: FAMILY MEDICINE

## 2021-02-17 PROCEDURE — 94664 DEMO&/EVAL PT USE INHALER: CPT

## 2021-02-17 PROCEDURE — 97530 THERAPEUTIC ACTIVITIES: CPT | Performed by: PHYSICAL THERAPIST

## 2021-02-17 PROCEDURE — 94640 AIRWAY INHALATION TREATMENT: CPT

## 2021-02-17 PROCEDURE — 87081 CULTURE SCREEN ONLY: CPT

## 2021-02-17 PROCEDURE — 36415 COLL VENOUS BLD VENIPUNCTURE: CPT

## 2021-02-17 PROCEDURE — 80053 COMPREHEN METABOLIC PANEL: CPT

## 2021-02-17 PROCEDURE — 85651 RBC SED RATE NONAUTOMATED: CPT

## 2021-02-17 PROCEDURE — 86140 C-REACTIVE PROTEIN: CPT

## 2021-02-17 PROCEDURE — 2580000003 HC RX 258: Performed by: FAMILY MEDICINE

## 2021-02-17 PROCEDURE — 2060000000 HC ICU INTERMEDIATE R&B

## 2021-02-17 RX ORDER — PROMETHAZINE HYDROCHLORIDE 25 MG/1
12.5 TABLET ORAL EVERY 6 HOURS PRN
Status: DISCONTINUED | OUTPATIENT
Start: 2021-02-17 | End: 2021-02-17

## 2021-02-17 RX ORDER — ONDANSETRON 2 MG/ML
4 INJECTION INTRAMUSCULAR; INTRAVENOUS EVERY 6 HOURS PRN
Status: DISCONTINUED | OUTPATIENT
Start: 2021-02-17 | End: 2021-02-17

## 2021-02-17 RX ORDER — GUAIFENESIN 400 MG/1
400 TABLET ORAL 3 TIMES DAILY
Status: DISCONTINUED | OUTPATIENT
Start: 2021-02-17 | End: 2021-02-26 | Stop reason: HOSPADM

## 2021-02-17 RX ORDER — SODIUM CHLORIDE 0.9 % (FLUSH) 0.9 %
10 SYRINGE (ML) INJECTION EVERY 12 HOURS SCHEDULED
Status: DISCONTINUED | OUTPATIENT
Start: 2021-02-17 | End: 2021-02-26 | Stop reason: HOSPADM

## 2021-02-17 RX ORDER — IPRATROPIUM BROMIDE AND ALBUTEROL SULFATE 2.5; .5 MG/3ML; MG/3ML
1 SOLUTION RESPIRATORY (INHALATION)
Status: DISCONTINUED | OUTPATIENT
Start: 2021-02-17 | End: 2021-02-26 | Stop reason: HOSPADM

## 2021-02-17 RX ORDER — SODIUM CHLORIDE 0.9 % (FLUSH) 0.9 %
10 SYRINGE (ML) INJECTION PRN
Status: DISCONTINUED | OUTPATIENT
Start: 2021-02-17 | End: 2021-02-26 | Stop reason: HOSPADM

## 2021-02-17 RX ADMIN — CEFTRIAXONE SODIUM 1000 MG: 1 INJECTION, POWDER, FOR SOLUTION INTRAMUSCULAR; INTRAVENOUS at 00:08

## 2021-02-17 RX ADMIN — ENOXAPARIN SODIUM 70 MG: 80 INJECTION SUBCUTANEOUS at 00:07

## 2021-02-17 RX ADMIN — ENOXAPARIN SODIUM 70 MG: 80 INJECTION SUBCUTANEOUS at 09:10

## 2021-02-17 RX ADMIN — BACLOFEN 20 MG: 10 TABLET ORAL at 00:08

## 2021-02-17 RX ADMIN — TROSPIUM CHLORIDE 20 MG: 20 TABLET, FILM COATED ORAL at 05:45

## 2021-02-17 RX ADMIN — CEFTRIAXONE SODIUM 1000 MG: 1 INJECTION, POWDER, FOR SOLUTION INTRAMUSCULAR; INTRAVENOUS at 23:30

## 2021-02-17 RX ADMIN — SODIUM CHLORIDE: 9 INJECTION, SOLUTION INTRAVENOUS at 00:08

## 2021-02-17 RX ADMIN — BACLOFEN 20 MG: 10 TABLET ORAL at 21:27

## 2021-02-17 RX ADMIN — BUDESONIDE 500 MCG: 0.5 SUSPENSION RESPIRATORY (INHALATION) at 09:05

## 2021-02-17 RX ADMIN — Medication 10 ML: at 00:09

## 2021-02-17 RX ADMIN — AZITHROMYCIN DIHYDRATE 500 MG: 500 INJECTION, POWDER, LYOPHILIZED, FOR SOLUTION INTRAVENOUS at 00:08

## 2021-02-17 RX ADMIN — IPRATROPIUM BROMIDE AND ALBUTEROL SULFATE 1 AMPULE: 2.5; .5 SOLUTION RESPIRATORY (INHALATION) at 17:22

## 2021-02-17 RX ADMIN — ARFORMOTEROL TARTRATE 15 MCG: 15 SOLUTION RESPIRATORY (INHALATION) at 09:05

## 2021-02-17 RX ADMIN — GUAIFENESIN 400 MG: 400 TABLET ORAL at 21:27

## 2021-02-17 ASSESSMENT — ENCOUNTER SYMPTOMS
ABDOMINAL PAIN: 0
COLOR CHANGE: 0
NAUSEA: 0
EYES NEGATIVE: 1
SHORTNESS OF BREATH: 1
COUGH: 1
VOMITING: 0

## 2021-02-17 ASSESSMENT — PAIN SCALES - GENERAL
PAINLEVEL_OUTOF10: 0

## 2021-02-17 NOTE — PROGRESS NOTES
OCCUPATIONAL THERAPY INITIAL EVALUATION      Date:2021  Patient Name: Zulay Valdes  MRN: [id number]  : 1940  Room: [address] [place] [address]    Evaluating OT: JASON Whitt OTR/L [id number]    AM-PAC Daily Activity Raw Score:   Recommended Adaptive Equipment: shower chair     Diagnosis: PE   Referring Practitioner: Satish Mcnair MD  Surgery: n/a   Pertinent Medical History: AAA, Lung CA, HTN, memory impairment   Precautions:  Falls, O2     Home Living: Pt lives alone in a 2 story home with step(s) to enter; bed/bath on main level   Bathroom setup: tub/shower unit   Equipment owned: none  Prior Level of Function: IND with ADLs/IADLs; using no AD for functional mobility   Driving: yes    Pain Level: 0/10  Cognition: A&O: 3/4; Follows 1 step directions, with repetition and increased time   Memory:  fair    Sequencing:  fair    Problem solving:  fair    Judgement/safety:  fair     Functional Assessment:   Initial Eval Status  Date: 21 Treatment Status  Date: Short Term Goals  Treatment frequency: 2-3 x/week   Feeding SUP   IND   Grooming Min A (standing at sink for facial washing)   SUP   UB Dressing SUP   IND   LB Dressing Min A (pt able to use crossing of leg technique to doff/lizzeth B socks)  SUP    Bathing Min A (simulated)  SUP    Toileting Min A (while standing to void, with increased time)  SUP   Bed Mobility  Log roll: NT  Supine to sit: NT   Sit to supine: NT   Log roll IND  Supine to sit: IND   Sit to supine: IND   Functional Transfers Sit to stand:SBA   Stand to sit:SBA  Commode: Min A (due to 1 LOB)  IND   Functional Mobility Min A (no AD, to/from bathroom, pt used furniture to hold onto, 1 LOB to commode)  SUP   Balance Sitting: SUP  Standing: Min A     Activity Tolerance fair     Visual/  Perceptual Glasses: yes                Hand dominance: L  UE ROM: RUE:  WFL  LUE:  WFL  Strength: RUE: grossly 5/5 LUE: grossly 5/5   Strength: B WFL  Fine Motor Coordination:  WFL     Hearing: [facility] Sensation:  No c/o numbness/tingling   Tone:  WFL  Edema: BLEs                             Comments:Cleared by RN to see pt. Upon arrival, patient sitting EOB and agreeable to OT session. At end of session, patient sitting on commode with HCA present. Pt would benefit from continued OT to increase functional independence and quality of life. Treatment: Pt required vc's for proper technique/safety with hand placement/body mechanics/posture for bed mobility/ADLs/functional tranfers/mobility. Pt required vc's for sequencing/initiation of ADLs/functional transfers. Pt able to stand at sink/commode ~6 mins to increase core strength/ standing balance/activity tolerance for ease with ADLs. Pt sat on commode for a while, attempting to have a bowel movement. Pt required increased time to complete ADLs/functional transfers due to management of multiple lines. Therapist attempted to obtain reading of pulse ox but was unable to obtain a reading. Pt required education on energy conservation techniques. Pt required rest breaks during session. Pt appeared to have tolerated session well and appears cooperative. Pt demo'ing fair understanding of education provided. Continue to educate. Eval Complexity: Low    Assessment of current deficits   Functional mobility [x]  ADLs [x] Strength [x]  Cognition []  Functional transfers  [x] IADLs [x] Safety Awareness [x]  Endurance [x]  Fine Motor Coordination [] Balance [x] Vision/perception [] Sensation []   Gross Motor Coordination [] ROM [] Delirium []                  Motor Control []    Plan of Care:     Instruction/training on adapted ADL techniques and AE recommendations to increase functional independence within precautions  Training on energy conservation strategies/techniques to improve independence/tolerance for self-care routine  Functional transfer/mobility training/DME recommendations for increased independence, safety, and fall prevention Patient/Family education to increase follow through with safety techniques and functional independence  Recommendation of environmental modifications for increased safety with functional transfers/mobility and ADLs  Therapeutic exercise to improve motor endurance, ROM, and functional strength for ADLs/functional transfers  Therapeutic activities to facilitate/challenge dynamic balance, stand tolerance, fine motor dexterity/in-hand manipulation for increased independence with ADLs  Neuro-muscular re-education: facilitation of righting/equilibrium reactions, midline orientation, scapular stability/mobility, normalization of muscle tone, and facilitation of volitional active controled movement    2-3 DAYS/WK FOR 1-2 WEEKS prn    Rehab Potential: Good for established goals, pt. assisted in establishment of goals. LTG: maximize independence with ADLs to return to PLOF    Patient instructed on diagnosis, prognosis/goals and plan of care. Demonstrated fair understanding. [] Malnutrition indicators have been identified and nursing has been notified to ensure a dietitian consult is ordered. Evaluation time includes thorough review of current medical information, gathering information on past medical & social history & PLOF, completion of standardized testing, informal observation of tasks, consultation with other medical professions/disciplines, assessment of data & development of POC/goals.      low Evaluation +     Treatment Time In: 1:10        Treatment Time Out: 1:25           Treatment Charges: Mins Units   Ther Ex  [id number]       Manual Therapy 52859       Thera Activities 75598       ADL/Home Mgt 26903 15 1   Neuro Re-ed 85847       Group Therapy        Orthotic manage/training  21926       Non-Billable Time       Total Timed Treatment 15 [address], [place], OTR/L [id number]

## 2021-02-17 NOTE — PROGRESS NOTES
Hospitalist Progress Note      PCP: Rigoberto Bower MD    Date of Admission: 2/16/2021    Chief Complaint: [address] Course: 78y.o. 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      Patient presented to emergency department complaints of altered mental status and shortness of breath. Patient with a history of lung cancer and COPD. Chronically on 3 L of home O2. Has shortness of breath at baseline but now is worse. Denies fever, chills, nausea, vomiting. Ports a productive cough. Vital signs assessed emergency department within normal limits and stable. Patient is 98% on 3 L nasal cannula. Laboratory studies of note creatinine 1.6, , , bilirubin 1.3, D-dimer 5250, urinalysis with trace leukoesterase. Chest x-ray shows advanced COPD with patchy bibasilar infiltrates concerning for pneumonia. CTA pulmonary reveals filling defect in the segmental and subsegmental branches of the right middle lobe and left lower lobe concerning for bilateral pulmonary embolism without significant cardiac strain.     Subjective: Patient seen and examined by me personally he is doing okay denies significant complaints he was found with positive blood culture results this morning and transaminases-LFTs increased twice-will order ultrasound right upper quadrant    Medications:  Reviewed    Infusion Medications    sodium chloride 75 mL/hr at 02/17/21 0008     Scheduled Medications    amLODIPine  5 mg Oral Daily    baclofen  20 mg Oral Nightly    vitamin D  2,000 Units Oral Daily    trospium  20 mg Oral BID AC    sodium chloride flush  10 mL Intravenous 2 times per day    enoxaparin  1 mg/kg Subcutaneous BID    cefTRIAXone (ROCEPHIN) IV  1,000 mg Intravenous Q24H  azithromycin  500 mg Intravenous Q24H    budesonide  0.5 mg Nebulization BID    And    Arformoterol Tartrate  15 mcg Nebulization BID     PRN Meds: albuterol, sodium chloride flush, promethazine **OR** ondansetron, polyethylene glycol, acetaminophen **OR** acetaminophen      Intake/Output Summary (Last 24 hours) at 2/17/2021 0847  Last data filed at 2/17/2021 0600  Gross per 24 hour   Intake 493 ml   Output 250 ml   Net 243 ml       Exam:    /72   Pulse 83   Temp 97.4 °F (36.3 °C) (Temporal)   Resp 20   Wt 155 lb (70.3 kg)   SpO2 94%   BMI 20.17 kg/m²     General appearance: No apparent distress, appears stated age and cooperative. HEENT: Pupils equal, round, and reactive to light. Conjunctivae/corneas clear. Neck: Supple, with full range of motion. No jugular venous distention. Trachea midline. Respiratory:  Normal respiratory effort. Clear to auscultation, bilaterally without Rales/Wheezes/Rhonchi. Cardiovascular: Regular rate and rhythm with normal S1/S2 without murmurs, rubs or gallops. Abdomen: Soft, non-tender, non-distended with normal bowel sounds. Musculoskeletal: No clubbing, cyanosis or edema bilaterally. Full range of motion without deformity. Skin: Skin color, texture, turgor normal.  No rashes or lesions. Neurologic:  Neurovascularly intact without any focal sensory/motor deficits. Cranial nerves: II-XII intact, grossly non-focal.  Psychiatric: Alert and oriented, thought content appropriate, normal insight    Labs:   Recent Labs     02/16/21  1530 02/17/21  0510   WBC 6.9 7.7   HGB 12.4* 12.8   HCT 42.2 43.1   * 90*     Recent Labs     02/16/21  1646 02/17/21  0510    138   K 4.8 5.0    102   CO2 26 31*   BUN 43* 48*   CREATININE 1.6* 1.6*   CALCIUM 8.3* 8.4*     Recent Labs     02/16/21  1646 02/17/21  0510   * 1,801*   * 1,272*   BILITOT 1.3* 0.8   ALKPHOS 81 94     No results for input(s): INR in the last 72 hours.   Recent Labs     02/16/21 OBSERVATION

## 2021-02-17 NOTE — CONSULTS
Nic Sims M.D.,[facility]  Jessica Nicholson D.O., F.A.C.O.I., Brandon Delacruz M.D. Ninoska Sheth M.D., Trina Marie M.D. Venita Epps D.O. Patient:  Deni Johnson 78 y.o. male MRN: [id number]     Date of Service: 2/17/2021      PULMONARY CONSULTATION    Reason for Consultation: PE, Lung Ca  Referring Physician: Jennifer Khan MD    Chief Complaint   Patient presents with    Shortness of Breath     hx of lung CA, non-compliant with 02, states he gets winded when he gets up to make food. Code Status: Full Code        SUBJECTIVE:    HPI:  This is an 75-year-old male with history of COPD with bullous emphysema, chronic hypoxemic respiratory failure noncompliant with wearing O2, NSCLC with lingular mass s/p SBRT with local radiation fibrosis that presents to emergency department with altered mental status and shortness of breath. Patient known to me from office. Dyspnea has progressed from his baseline. Reports having hard time breathing yesterday. He was huffing and puffing. Denies any fever, chills or feeling ill. Does report having cough, but very minimal.  Just a little bit of mucus. Denies any hemoptysis. Work-up in the emergency department included a CT pulmonary angiogram revealing defects in the segmental and subsegmental branches of the right middle lobe and left lower lobe concerning for bilateral pulmonary embolism. Pertinent history:    Lung CA:  Patient had CT 3/2019 showing 2.3 x 2.8 cm spiculated mass in the lingua. 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 PET positive at tumor site. SUV 24. left upper lobe bronchus with SUV 4.6. Mediastinum is clear. Felt not to be a candidate for resection. Has undergone SBRT.   Patient had good response to SBRT based on CT 10/11/19.    Inability: Not on file    Transportation needs     Medical: Not on file     Non-medical: Not on file   Tobacco Use    Smoking status: Former Smoker     Packs/day: 0.75     Years: 60.00     Pack years: 45.00     Types: Cigarettes     Start date: 1959     Quit date: 2019     Years since quittin.6    Smokeless tobacco: Never Used   Substance and Sexual Activity    Alcohol use: No    Drug use: Never    Sexual activity: Not Currently   Lifestyle    Physical activity     Days per week: Not on file     Minutes per session: Not on file    Stress: Not on file   Relationships    Social connections     Talks on phone: Not on file     Gets together: Not on file     Attends Tenriism service: Not on file     Active member of club or organization: Not on file     Attends meetings of clubs or organizations: Not on file     Relationship status: Not on file    Intimate partner violence     Fear of current or ex partner: Not on file     Emotionally abused: Not on file     Physically abused: Not on file     Forced sexual activity: Not on file   Other Topics Concern    Not on file   Social History Narrative    Lives in [place] alone. Retired from [employer]. Social Hx: 54 pkyr history. 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 There is not a history of alcohol or recreational drug use    Vaccines:    Immunization History   Administered Date(s) Administered    Influenza Vaccine, unspecified formulation 2018, 10/26/2018    Influenza, High Dose (Fluzone 65 yrs and older) 10/01/2015, 2016    Influenza, Quadv, IM, PF (6 mo and older Fluzone, Flulaval, Fluarix, and 3 yrs and older Afluria) 2020    Influenza, Quadv, adjuvanted, 65 yrs +, IM, PF (Fluad) 2020    Influenza, Triv, inactivated, subunit, adjuvanted, IM (Fluad 65 yrs and older) 2017, 2019  Pneumococcal Conjugate 13-valent ([id number]) 03/18/2015    Pneumococcal Polysaccharide ([id number]) 01/01/2012, 08/09/2017    Zoster Live (Zostavax) 12/03/2014        Home Meds: Medications Prior to Admission: Cholecalciferol (VITAMIN D3) 50 MCG (2000 UT) TABS, Take 2,000 Units by mouth daily  tamsulosin (FLOMAX) 0.4 MG capsule, Take 0.4 mg by mouth 4 times daily as needed  baclofen (LIORESAL) 20 MG tablet, Take 20 mg by mouth nightly  solifenacin (VESICARE) 10 MG tablet, Take 10 mg by mouth every other day   budesonide-formoterol (SYMBICORT) 160-4.5 MCG/ACT AERO, Inhale 2 puffs into the lungs 2 times daily (Patient not taking: Reported on 1/6/2021)  albuterol (PROVENTIL) (2.5 MG/3ML) 0.083% nebulizer solution, Take 3 mLs by nebulization every 6 hours as needed for Wheezing DX: COPD J44.9, Lung Cancer  amLODIPine (NORVASC) 5 MG tablet, Take 5 mg by mouth daily    CURRENT MEDS :  Scheduled Meds:   amLODIPine  5 mg Oral Daily    baclofen  20 mg Oral Nightly    vitamin D  2,000 Units Oral Daily    trospium  20 mg Oral BID AC    sodium chloride flush  10 mL Intravenous 2 times per day    enoxaparin  1 mg/kg Subcutaneous BID    cefTRIAXone (ROCEPHIN) IV  1,000 mg Intravenous Q24H    azithromycin  500 mg Intravenous Q24H    budesonide  0.5 mg Nebulization BID    And    Arformoterol Tartrate  15 mcg Nebulization BID       Continuous Infusions:   sodium chloride 75 mL/hr at 02/17/21 0008       Allergies   Allergen Reactions    Codeine Dermatitis       REVIEW OF SYSTEMS:  REVIEW OF SYMPTOMS:  Review of Systems   Unable to perform ROS: Dementia   Constitutional: Negative for chills, fatigue and fever. HENT: Negative. Eyes: Negative. Respiratory: Positive for cough and shortness of breath. Cardiovascular: Negative for chest pain and leg swelling. Gastrointestinal: Negative for abdominal pain, nausea and vomiting. Endocrine: Negative for cold intolerance and heat intolerance. Genitourinary: Negative for difficulty urinating and dysuria. Musculoskeletal: Negative. Skin: Negative for color change and rash. Allergic/Immunologic: Negative for environmental allergies and immunocompromised state. Neurological: Negative for dizziness and weakness. Psychiatric/Behavioral: Negative for agitation and confusion. OBJECTIVE:   /72   Pulse 83   Temp 97.4 °F (36.3 °C) (Temporal)   Resp 20   Wt 155 lb (70.3 kg)   SpO2 94%   BMI 20.17 kg/m²   SpO2 Readings from Last 1 Encounters:   02/17/21 94%        I/O:    Intake/Output Summary (Last 24 hours) at 2/17/2021 0943  Last data filed at 2/17/2021 0912  Gross per 24 hour   Intake 493 ml   Output 275 ml   Net 218 ml     Vent Information  SpO2: 94 %                PHYSICAL EXAM:  Physical Exam  Constitutional:       General: He is not in acute distress. Appearance: He is underweight. HENT:      Head: Normocephalic and atraumatic. Mouth/Throat:      Pharynx: No oropharyngeal exudate. Eyes:      General: No scleral icterus. Conjunctiva/sclera: Conjunctivae normal.   Neck:      Musculoskeletal: Neck supple. Trachea: No tracheal deviation. Cardiovascular:      Rate and Rhythm: Normal rate. Heart sounds: Normal heart sounds. Pulmonary:      Effort: Pulmonary effort is normal.      Breath sounds: Examination of the right-lower field reveals decreased breath sounds. Examination of the left-lower field reveals decreased breath sounds. Decreased breath sounds present. Abdominal:      Palpations: Abdomen is soft. Tenderness: There is no abdominal tenderness. Musculoskeletal:         General: Swelling present. No deformity. Lymphadenopathy:      Cervical: No cervical adenopathy. Skin:     General: Skin is warm. Findings: No rash. Neurological:      General: No focal deficit present. Mental Status: He is alert and oriented to person, place, and time.    Psychiatric: Behavior: Behavior normal.         Pulmonary Function Testing personally reviewed and interpreted. PERTINENT LAB RESULTS: Labs reviewed. DIAGNOSTICS: Pertinent imaging reviewed. 2/16 CT pulmonary angiogram:  IMPRESSION   Filling defect in the segmental and subsegmental branches of right middle   lobe and left lower lobe concerning for bilateral pulmonary embolism without   significant cardiac strain   Advanced COPD with patchy bibasilar infiltrates and pleural effusion   concerning for pneumonia and or edema. 2 x 1.5 cm nodule in the lingula which is marginally increased          ASSESSMENT:     1. Acute b/l pulmonary embolism  *PE in setting of malignancy. Filling defect in segmental and subsegmental branches of RML and LLL. 2. Acute on chronic respiratory failure 2nd to acute PE, patchy infiltrates, and effusions. *Chronic resp failure on 2L, however patient noncompliant with home O2 and refuses to wear despite counseling multiple times. 3. Bilateral pleural effusions L>R with atelectasis/infiltrate. Possible pneumonia. Bibasilar infiltrate may be associated atelectasis or pneumonic process. 4. Bullous emphysema  *Hx of tobacco use 55 py's and multiple occulational exposures. Moderat obstruction on PFT 5/2019, FEV1 55%    5. 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 Had good response based on imaging. Repeat imaging 3/2020 showing new lingular nodule. Underwent SBRT again. 6. 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 History prostate CA 2009 treated with XRT    8. Hx of tobacco use. 55 py history. 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 9. Non-compliance with wearing home O2. PLAN:     ? Continue supplemental O2 to maintain SpO2 88 - 92%  ? Counseled on importance and risk of not wearing O2. ? DC fluids, diuresis if renal function permits  ? Possible thoracentesis pending infectious work-up and repeat imaging  ? Continue Lovenox 1 mg/kg every 12. If no thoracentesis then transition to [address] [place] [address]. Will need long-term anticoagulation given malignancy. ? Check respiratory cultures and inflammatory markers  ? Continue antibiotics for now with ceftriaxone, azithromycin pending infectious work-up  ? Check b/l lower ext dopplers, 2D ECHO  ? Continue aerosol bronchodilators with Pulmicort, Brovana, DuoNeb every 4 while awake and as needed  ? Bronchopulmonary hygiene, Mucinex, flutter/pep, EZ Pap        Thank you for allowing me to participate in the care of Scar Christina. Please feel free to call with questions.        Electronically signed by Raquel Gutierrez DO on 2/17/2021 at 9:43 AM
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
## 2021-02-17 NOTE — PROGRESS NOTES
Physical Therapy    Physical Therapy Initial Assessment     Name: Colleen Ang  : 1940  MRN: [id number]    Referring Provider:  Brian Perez MD    Date of Service: 2021    Evaluating PT:  Lynn Shabazz PT, DPT [id number]      Room #:  4963/4013-K  Diagnosis:  Pulmonary embolism unspecified chronicity  PMHx/PSHx:  Prostate CA, punctured lung, HTN, memory impairment, NSCLC of upper lobe L lung, malignant neoplasm of lingula of L lung, hx radiation therapy  Procedure/Surgery:  None this admission  Precautions:  Falls, impulsive  Equipment Needs:  TBD    SUBJECTIVE:    Pt lives alone in a 2 story house with 2 stairs to enter and no rail. Bed is on first floor and bath is on first floor. Pt ambulated with no AD prior to admission. Pt reports he uses 3L/min O2 via NC as needed. OBJECTIVE:   Initial Evaluation  Date: 21 Treatment Short Term/ Long Term   Goals   AM-PAC 6 Clicks 91/62     Was pt agreeable to Eval/treatment? yes     Does pt have pain? Pt stating \"I don't know\" when asked, no signs of discomfort     Bed Mobility  Rolling: NT  Supine to sit: NT  Sit to supine: NT  Scooting: NT  Sitting EOB upon arrival, remained EOB  Rolling: Independent  Supine to sit: Independent  Sit to supine: Independent  Scooting: Independent   Transfers Sit to stand: SBA  Stand to sit: SBA  Stand pivot: SBA with no AD  Sit to stand: Independent  Stand to sit: Independent  Stand pivot: Mod Independent with AAD   Ambulation    60 feet with no AD with SBA  60 feet with no AD with Min A  >150 feet with AAD with Mod Independent    Stair negotiation: ascended and descended  NT  2 steps with no rails Mod Independent    ROM BUE:  WNL  BLE:  WNL     Strength BUE:  WNL  BLE:  WNl     Balance Sitting EOB:  Independent  Dynamic Standing:  SBA>Min A with no AD  Sitting EOB:  Independent  Dynamic Standing:   Mod Independent with AAD     Pt is A & O x 3  Sensation:  Pt denies numbness and tingling to extremities Edema:  unremarkable    Vitals:  Unable to obtain SPO2 with multiple attempts with finger pulse ox, pt on 6L/min O2 via NC. Required standing ret break due to mild dyspnea during ambulation, recovered within 2 minutes and returned to room. Patient education  Pt educated on role of therapy, safety with mobility, improved gait mechanics and balance    Patient response to education:   Pt verbalized understanding Pt demonstrated skill Pt requires further education in this area   yes yes yes     ASSESSMENT:    Comments:  Pt sitting EOB upon arrival, agreeable to PT eval. Pt does not consistently answer subjective questions stating \"I don't know\" and asking \"How should I know? \" when asked regarding pain and dyspnea respectively. Pt impulsive with movements and transfers throughout. He requests therapist to not touch him during ambulation. Pt experienced R lateral LOB and mild unsteadiness during initial ambulation; requiring manual assist for recovery and standing rest break prior to returning to room. Pt required manual assist for balance with amb to return to room. Pt maintains B UEs with hands in pockets-- cued to allow natural arm swing for improved balance throughout. Pt seated EOB upon completion of session as pt firmly declines to be seated in chair. Pt with all needs in reach. He is functioning slightly below reported baseline and will benefit from short course of skilled PT services to improve independence with all functional mobility, balance training and gait training with least restrictive AD, and improved overall endurance with decreased SOB. Treatment:  Patient practiced and was instructed in the following treatment:   · Transfer training: cues for UE placement, manual assistance for transfer training  · Gait training: cues for upright posture and manual assistance for balance, cues for allowing UE swing for improved balance    Pt's/ family goals   1.  To return home independently Patient and or family understand(s) diagnosis, prognosis, and plan of care. yes    PLAN OF CARE:    Current Treatment Recommendations   [x] Strengthening     [] ROM   [x] Balance Training   [x] Endurance Training   [x] Transfer Training   [x] Gait Training   [x] Stair Training   [] Positioning   [x] Safety and Education Training   [x] Patient/Caregiver Education   [] HEP  [] Other       PT care will be provided in accordance with the objectives noted above. Whenever appropriate, clear delegation orders will be provided for nursing staff. Exercises and functional mobility practice will be used as well as appropriate assistive devices or modalities to obtain goals. Patient and family education will also be administered as needed. Frequency of treatments: 2-5x/week x 7-10 days. Time in  1414  Time out  1440    Total Treatment Time  15 minutes     Evaluation Time includes thorough review of current medical information, gathering information on past medical history/social history and prior level of function, completion of standardized testing/informal observation of tasks, assessment of data and education on plan of care and goals.     CPT codes:  [] Low Complexity PT evaluation 36519  [x] Moderate Complexity PT evaluation 06880  [] High Complexity PT evaluation 69088  [] PT Re-evaluation 23885  [] Gait training 53489 0 minutes  [] Manual therapy 40582 0 minutes  [x] Therapeutic activities 11555 15 minutes  [] Therapeutic exercises 65570 0 minutes  [] Neuromuscular reeducation 85292 0 minutes     Lynn Shabazz, PT, DPT  [id number]

## 2021-02-18 ENCOUNTER — APPOINTMENT (OUTPATIENT)
Dept: GENERAL RADIOLOGY | Age: 81
DRG: 175 | End: 2021-02-18
Payer: MEDICARE

## 2021-02-18 ENCOUNTER — APPOINTMENT (OUTPATIENT)
Dept: ULTRASOUND IMAGING | Age: 81
DRG: 175 | End: 2021-02-18
Payer: MEDICARE

## 2021-02-18 LAB
ALBUMIN SERPL-MCNC: 3.5 G/DL (ref 3.5–5.2)
ALP BLD-CCNC: 89 U/L (ref 40–129)
ALT SERPL-CCNC: 927 U/L (ref 0–40)
ANION GAP SERPL CALCULATED.3IONS-SCNC: 6 MMOL/L (ref 7–16)
ANISOCYTOSIS: ABNORMAL
APPEARANCE FLUID: NORMAL
AST SERPL-CCNC: 868 U/L (ref 0–39)
BASOPHILS ABSOLUTE: 0 E9/L (ref 0–0.2)
BASOPHILS RELATIVE PERCENT: 0 % (ref 0–2)
BILIRUB SERPL-MCNC: 0.5 MG/DL (ref 0–1.2)
BUN BLDV-MCNC: 47 MG/DL (ref 8–23)
CALCIUM SERPL-MCNC: 8.4 MG/DL (ref 8.6–10.2)
CELL COUNT FLUID TYPE: NORMAL
CHLORIDE BLD-SCNC: 103 MMOL/L (ref 98–107)
CO2: 34 MMOL/L (ref 22–29)
COLOR FLUID: NORMAL
CREAT SERPL-MCNC: 1.2 MG/DL (ref 0.7–1.2)
EOSINOPHILS ABSOLUTE: 0 E9/L (ref 0.05–0.5)
EOSINOPHILS RELATIVE PERCENT: 0 % (ref 0–6)
FLUID TYPE: NORMAL
FLUID TYPE: NORMAL
GFR AFRICAN AMERICAN: >60
GFR NON-AFRICAN AMERICAN: >60 ML/MIN/1.73
GLUCOSE BLD-MCNC: 95 MG/DL (ref 74–99)
GLUCOSE, FLUID: 129 MG/DL
HAV IGM SER IA-ACNC: NORMAL
HBV SURFACE AB TITR SER: NORMAL {TITER}
HCT VFR BLD CALC: 41.4 % (ref 37–54)
HEMOGLOBIN: 12.3 G/DL (ref 12.5–16.5)
HEPATITIS B CORE IGM ANTIBODY: NORMAL
HEPATITIS B SURFACE ANTIGEN INTERPRETATION: NORMAL
HEPATITIS C ANTIBODY INTERPRETATION: NORMAL
HIV-1 AND HIV-2 ANTIBODIES: NORMAL
L. PNEUMOPHILA SEROGP 1 UR AG: NORMAL
LACTIC ACID, SEPSIS: 1.2 MMOL/L (ref 0.5–1.9)
LD, FLUID: 115 U/L
LYMPHOCYTES ABSOLUTE: 1.52 E9/L (ref 1.5–4)
LYMPHOCYTES RELATIVE PERCENT: 20 % (ref 20–42)
MCH RBC QN AUTO: 26 PG (ref 26–35)
MCHC RBC AUTO-ENTMCNC: 29.7 % (ref 32–34.5)
MCV RBC AUTO: 87.5 FL (ref 80–99.9)
MONOCYTE, FLUID: 39 %
MONOCYTES ABSOLUTE: 0.61 E9/L (ref 0.1–0.95)
MONOCYTES RELATIVE PERCENT: 8 % (ref 2–12)
MRSA CULTURE ONLY: NORMAL
NEUTROPHIL, FLUID: 61 %
NEUTROPHILS ABSOLUTE: 5.47 E9/L (ref 1.8–7.3)
NEUTROPHILS RELATIVE PERCENT: 72 % (ref 43–80)
NUCLEATED CELLS FLUID: 1384 /UL
OVALOCYTES: ABNORMAL
PDW BLD-RTO: 21.9 FL (ref 11.5–15)
PLATELET # BLD: 88 E9/L (ref 130–450)
PLATELET CONFIRMATION: NORMAL
PMV BLD AUTO: ABNORMAL FL (ref 7–12)
POIKILOCYTES: ABNORMAL
POLYCHROMASIA: ABNORMAL
POTASSIUM REFLEX MAGNESIUM: 5 MMOL/L (ref 3.5–5)
POTASSIUM SERPL-SCNC: 5 MMOL/L (ref 3.5–5)
PROTEIN FLUID: 2.6 G/DL
RBC # BLD: 4.73 E12/L (ref 3.8–5.8)
RBC FLUID: NORMAL /UL
SCHISTOCYTES: ABNORMAL
SODIUM BLD-SCNC: 143 MMOL/L (ref 132–146)
STREP PNEUMONIAE ANTIGEN, URINE: NORMAL
TARGET CELLS: ABNORMAL
TOTAL PROTEIN: 7.2 G/DL (ref 6.4–8.3)
URINE CULTURE, ROUTINE: NORMAL
WBC # BLD: 7.6 E9/L (ref 4.5–11.5)

## 2021-02-18 PROCEDURE — 86703 HIV-1/HIV-2 1 RESULT ANTBDY: CPT

## 2021-02-18 PROCEDURE — 0W9B3ZZ DRAINAGE OF LEFT PLEURAL CAVITY, PERCUTANEOUS APPROACH: ICD-10-PCS | Performed by: INTERNAL MEDICINE

## 2021-02-18 PROCEDURE — 87205 SMEAR GRAM STAIN: CPT

## 2021-02-18 PROCEDURE — 2700000000 HC OXYGEN THERAPY PER DAY

## 2021-02-18 PROCEDURE — 83986 ASSAY PH BODY FLUID NOS: CPT

## 2021-02-18 PROCEDURE — 83605 ASSAY OF LACTIC ACID: CPT

## 2021-02-18 PROCEDURE — 6370000000 HC RX 637 (ALT 250 FOR IP): Performed by: INTERNAL MEDICINE

## 2021-02-18 PROCEDURE — 88305 TISSUE EXAM BY PATHOLOGIST: CPT

## 2021-02-18 PROCEDURE — 2580000003 HC RX 258: Performed by: INTERNAL MEDICINE

## 2021-02-18 PROCEDURE — 71045 X-RAY EXAM CHEST 1 VIEW: CPT

## 2021-02-18 PROCEDURE — 94640 AIRWAY INHALATION TREATMENT: CPT

## 2021-02-18 PROCEDURE — 32555 ASPIRATE PLEURA W/ IMAGING: CPT

## 2021-02-18 PROCEDURE — 82947 ASSAY GLUCOSE BLOOD QUANT: CPT

## 2021-02-18 PROCEDURE — 83615 LACTATE (LD) (LDH) ENZYME: CPT

## 2021-02-18 PROCEDURE — 85025 COMPLETE CBC W/AUTO DIFF WBC: CPT

## 2021-02-18 PROCEDURE — 84157 ASSAY OF PROTEIN OTHER: CPT

## 2021-02-18 PROCEDURE — 86706 HEP B SURFACE ANTIBODY: CPT

## 2021-02-18 PROCEDURE — 80053 COMPREHEN METABOLIC PANEL: CPT

## 2021-02-18 PROCEDURE — 36415 COLL VENOUS BLD VENIPUNCTURE: CPT

## 2021-02-18 PROCEDURE — 88112 CYTOPATH CELL ENHANCE TECH: CPT

## 2021-02-18 PROCEDURE — 87040 BLOOD CULTURE FOR BACTERIA: CPT

## 2021-02-18 PROCEDURE — 89051 BODY FLUID CELL COUNT: CPT

## 2021-02-18 PROCEDURE — 6370000000 HC RX 637 (ALT 250 FOR IP): Performed by: FAMILY MEDICINE

## 2021-02-18 PROCEDURE — 2060000000 HC ICU INTERMEDIATE R&B

## 2021-02-18 PROCEDURE — 2580000003 HC RX 258: Performed by: FAMILY MEDICINE

## 2021-02-18 PROCEDURE — 80074 ACUTE HEPATITIS PANEL: CPT

## 2021-02-18 PROCEDURE — 87070 CULTURE OTHR SPECIMN AEROBIC: CPT

## 2021-02-18 RX ORDER — SODIUM CHLORIDE 0.9 % (FLUSH) 0.9 %
10 SYRINGE (ML) INJECTION PRN
Status: DISCONTINUED | OUTPATIENT
Start: 2021-02-18 | End: 2021-02-26 | Stop reason: HOSPADM

## 2021-02-18 RX ORDER — SODIUM CHLORIDE 0.9 % (FLUSH) 0.9 %
10 SYRINGE (ML) INJECTION EVERY 12 HOURS SCHEDULED
Status: DISCONTINUED | OUTPATIENT
Start: 2021-02-18 | End: 2021-02-26 | Stop reason: HOSPADM

## 2021-02-18 RX ADMIN — TROSPIUM CHLORIDE 20 MG: 20 TABLET, FILM COATED ORAL at 14:52

## 2021-02-18 RX ADMIN — AMLODIPINE BESYLATE 5 MG: 5 TABLET ORAL at 10:22

## 2021-02-18 RX ADMIN — IPRATROPIUM BROMIDE AND ALBUTEROL SULFATE 1 AMPULE: 2.5; .5 SOLUTION RESPIRATORY (INHALATION) at 16:35

## 2021-02-18 RX ADMIN — Medication 10 ML: at 19:52

## 2021-02-18 RX ADMIN — GUAIFENESIN 400 MG: 400 TABLET ORAL at 14:51

## 2021-02-18 RX ADMIN — GUAIFENESIN 400 MG: 400 TABLET ORAL at 10:22

## 2021-02-18 RX ADMIN — SODIUM CHLORIDE: 9 INJECTION, SOLUTION INTRAVENOUS at 19:51

## 2021-02-18 RX ADMIN — APIXABAN 10 MG: 5 TABLET, FILM COATED ORAL at 19:51

## 2021-02-18 RX ADMIN — GUAIFENESIN 400 MG: 400 TABLET ORAL at 19:51

## 2021-02-18 RX ADMIN — BACLOFEN 20 MG: 10 TABLET ORAL at 19:51

## 2021-02-18 RX ADMIN — TROSPIUM CHLORIDE 20 MG: 20 TABLET, FILM COATED ORAL at 06:02

## 2021-02-18 RX ADMIN — Medication 2000 UNITS: at 10:23

## 2021-02-18 ASSESSMENT — PAIN SCALES - GENERAL
PAINLEVEL_OUTOF10: 0
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
## 2021-02-18 NOTE — PROGRESS NOTES
Phyllis Duran M.D.,[facility]  Sid Hamlin D.O., FMIGUELOAmbarI., Trung Kevin M.D. Valentina Berger M.D., Fam Steven M.D. Rand Cuevas D.O. Daily Pulmonary Progress Note    Patient:  Leelee Curiel 78 y.o. male MRN: [id number]     Date of Service: 2/18/2021        Subjective      Patient was seen and examined. Reports feeling okay today. Thoracentesis at bedside with 250 cc of retained fluid removed from left chest.    Objective   Vitals: BP (!) 144/90   Pulse 85   Temp 97.3 °F (36.3 °C) (Temporal)   Resp 22   Ht 6' 1.5\" (1.867 m)   Wt 174 lb (78.9 kg)   SpO2 91%   BMI 22.65 kg/m²     I/O:    Intake/Output Summary (Last 24 hours) at 2/18/2021 1750  Last data filed at 2/18/2021 1438  Gross per 24 hour   Intake 1985 ml   Output 700 ml   Net 1285 ml       CURRENT MEDS :  Scheduled Meds:   sodium chloride flush  10 mL Intravenous 2 times per day    ipratropium-albuterol  1 ampule Inhalation Q4H While awake    guaiFENesin  400 mg Oral TID    sodium chloride flush  10 mL Intravenous 2 times per day    enoxaparin  1 mg/kg Subcutaneous BID    amLODIPine  5 mg Oral Daily    baclofen  20 mg Oral Nightly    vitamin D  2,000 Units Oral Daily    trospium  20 mg Oral BID AC    sodium chloride flush  10 mL Intravenous 2 times per day    budesonide  0.5 mg Nebulization BID    And    Arformoterol Tartrate  15 mcg Nebulization BID       Continuous Infusions:   sodium chloride 75 mL/hr at 02/17/21 0008       PRN Meds:  sodium chloride flush, perflutren lipid microspheres, sodium chloride flush, albuterol, sodium chloride flush, promethazine **OR** ondansetron, polyethylene glycol      Physical Exam:  Physical Exam  Constitutional:       General: He is not in acute distress. Appearance: He is underweight. HENT:      Head: Normocephalic and atraumatic. Mouth/Throat:      Pharynx: No oropharyngeal exudate. Eyes:      General: No scleral icterus. Conjunctiva/sclera: Conjunctivae normal.   Neck:      Musculoskeletal: Neck supple. Trachea: No tracheal deviation. Cardiovascular:      Rate and Rhythm: Normal rate. Heart sounds: Normal heart sounds. Pulmonary:      Effort: Pulmonary effort is normal.      Breath sounds: Examination of the right-lower field reveals decreased breath sounds. Examination of the left-lower field reveals decreased breath sounds. Decreased breath sounds present. Abdominal:      Palpations: Abdomen is soft. Tenderness: There is no abdominal tenderness. Musculoskeletal:         General: Swelling present. No deformity. Lymphadenopathy:      Cervical: No cervical adenopathy. Skin:     General: Skin is warm. Findings: No rash. Neurological:      General: No focal deficit present. Mental Status: He is alert and oriented to person, place, and time. Psychiatric:         Behavior: Behavior normal.         Pertinent/ New Labs and Imaging Studies     Pulmonary Function Testing personally reviewed and interpreted. PERTINENT LAB RESULTS: Labs reviewed. DIAGNOSTICS: Pertinent imaging reviewed. Assessment:      1. Acute b/l pulmonary embolism  *PE in setting of malignancy. Filling defect in segmental and subsegmental branches of RML and LLL. US negative for DVT.     2. Acute on chronic respiratory failure 2nd to acute PE, patchy infiltrates, and effusions. *Chronic resp failure on 2L, however patient noncompliant with home O2 and refuses to wear despite counseling multiple times.     3. Bilateral pleural effusions L>R with atelectasis/infiltrate. - s/p left thoracentesis 250 cc removed 2/18, transudative fluid  Possible pneumonia. Bibasilar infiltrate may be associated atelectasis or pneumonic process. 4. COPD with bullous emphysema  *Hx of tobacco use 55 py's and multiple occulational exposures. Moderate obstruction on PFT 5/2019, FEV1 55%     5.  NSCLC *Squamous cell Ca 3/2019 of Lung underwent SBRT and felt not to be candidate for resection. Had good response based on imaging. Repeat imaging 3/2020 showing new lingular nodule. Underwent SBRT again.      6. History prostate CA 2009 treated with XRT     7. Hx of tobacco use. 55 py history. 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     8. Non-compliance with wearing home O2.    9. Bactermia, possible contaminant      Plan:     · Continue supplemental O2 to maintain SpO2 88 - 92%  · Pleural fluid transudate, await pleural fluid culture  · Transition to Eliquis. Will need long-term anticoagulation given malignancy. · 2D ECHO  · Continue aerosol bronchodilators with Pulmicort, Brovana, DuoNeb every 4 while awake and as needed  · Bronchopulmonary hygiene, Mucinex, flutter/pep, EZ Pap  · abx per ID      Thank you for allowing me to participate in the care of [employer]. Please feel free to call with questions.      Electronically signed by July Muñiz DO on 2/18/2021 at 5:50 PM
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
## 2021-02-18 NOTE — CARE COORDINATION
Pt continues to plan for home at discharge. He walked 120 ft with therapy, tolerated well. Bedside thoracentisis today, 250cc removed. IVF and IV Rocephin continues.  CM will follow    Darcy REZA, RN  [facility] Case Management  [phone]

## 2021-02-18 NOTE — PROGRESS NOTES
Comprehensive Nutrition Assessment    Type and Reason for Visit:  Initial, Positive Nutrition Screen    Nutrition Recommendations/Plan: Continue Current Diet, Start Oral Nutrition Supplement(Magic cup BID)    Nutrition Assessment:  Pt at nutritional risk 2/2 current decreased appetite average 25-50 admit w/ SOB/ B/L pulmonary emboli s/p thoracentesis (250cc removed) Noted hx COPD, prostate CA, & current lung CA w/ RT. Will add Magic Cup BID and continue to monitor. Malnutrition Assessment:  Malnutrition Status:  Insufficient data    Context:  Acute Illness     Findings of the 6 clinical characteristics of malnutrition:  Energy Intake: Mild decrease in energy intake(x 2 days Unable to quantify PTA intake)   Weight Loss:  No significant weight loss     Body Fat Loss:  Unable to assess   Muscle Mass Loss:  Unable to assess  Fluid Accumulation:  No significant fluid accumulation   Strength:  Not measured     Estimated Daily Nutrient Needs:  Energy (kcal):   kcal(1465*1.6=6835); Weight Used for Energy Requirements:  Current     Protein (g):   g/day(1.3 g/kg*70=91 g, 1.5 g/kg*97=956 g); Weight Used for Protein Requirements:  Current        Fluid (ml/day):  0218-2419; Method Used for Fluid Requirements:  1 ml/kcal      Nutrition Related Findings:  AMS PTA, current intermittent confusion, abd soft/flat, hypoactive BS, + I/Os 1 L, +1 edema, elevated LFT's (ALT and AST)      Wounds:  None       Current Nutrition Therapies:    DIET GENERAL;  Dietary Nutrition Supplements: Frozen Oral Supplement    Anthropometric Measures:  · Height: 6' 1.5\" (186.7 cm)  · Current Body Weight: 155 lb (70.3 kg)(2/16 no method, 2/18 174 lbs  (bedscale likely inaccurate)  · Admission Body Weight: 155 lb (70.3 kg)(2/16 no method)    · Usual Body Weight: 146 lb (66.2 kg)(recent EMR 1/6/21 standing scale.  Noted 150lb 10/12 actual)     · Ideal Body Weight: 187 lbs; % Ideal Body Weight 82.9 % · BMI: 20.2 BMI Categories: Underweight (BMI less than 22) age over 72       Nutrition Diagnosis:   · Inadequate oral intake related to impaired respiratory function(chronic resp failure/COPD) as evidenced by poor intake prior to admission, intake 26-50%    Nutrition Interventions:   Nutrition Education/Counseling:  Education not indicated   Coordination of Nutrition Care:  Continue to monitor while inpatient    Goals:  PO intake of ONS 25-50%       Nutrition Monitoring and Evaluation:   Food/Nutrient Intake Outcomes:  Food and Nutrient Intake, Supplement Intake  Physical Signs/Symptoms Outcomes:  Biochemical Data, Fluid Status or Edema, Nutrition Focused Physical Findings, Skin, Weight, Chewing or Swallowing, GI Status     Discharge Planning:     Too soon to determine     Electronically signed by Clarisa Marcos RD, LD on 2/18/21 at 11:49 AM EST    Contact: Ext [phone]

## 2021-02-18 NOTE — PROCEDURES
THORACENTESIS PROCEDURE NOTE    Date: 2/18/2021    Procedure:  diagnostic and therapeutic thoracentesis    Attending:  Heather Barber    Indication: pleural effusion    Findings:  1. Small free flowing left effusion  2. 250 cc of red tinged fluid removed    Site: left    Consent: Obtained    Medications: 5 cc of 1% lidocaine    Description:  The procedure, including risks and benefits, was discussed. Timeout performed. Ultrasound used: Yes. Using ultrasound, largest pocket of pleural fluid identified. Area prepped and draped in sterile fashion. Anesthesia with 5 ml 1% lidocaine. An 18 gauge needle with 8 Malawian catheter was advanced into the pleural space, catheter advanced over the needle and needle withdrawn. 250 mL of fluid was collected for diagnostic and therapeutic purposes. Catheter removed. Dressing placed over the procedure site. Complications: 2 attempts. Patient moving and unable to get into pleural space on first attempt. Able without difficulty at new site.     Est blood loss: minimal    ELECTRONICALLY SIGNED:  Heather Barber DO  2/18/2021  9:28 AM

## 2021-02-18 NOTE — PROGRESS NOTES
Occupational Therapy  OT SESSION ATTEMPT     Date:2021  Patient Name: Micheal Ma  MRN: [id number]  : 1940  Room: [address] [place] [address]A     Attempted OT session this date:    [] unavailable due to other medical staff currently with pt   [] on hold per nursing staff   [] on hold per nursing staff secondary to lab / radiology results    [x] Declined treatment this date due to \"I'm not in the mood for any of that activity\"   [] off unit    [] Other:     Will reattempt OT session at a later time.       Eric Cadet 44, [address] [facility]

## 2021-02-19 ENCOUNTER — APPOINTMENT (OUTPATIENT)
Dept: CT IMAGING | Age: 81
DRG: 175 | End: 2021-02-19
Payer: MEDICARE

## 2021-02-19 ENCOUNTER — APPOINTMENT (OUTPATIENT)
Dept: GENERAL RADIOLOGY | Age: 81
DRG: 175 | End: 2021-02-19
Payer: MEDICARE

## 2021-02-19 LAB
AADO2: 233.4 MMHG
ALBUMIN SERPL-MCNC: 3.6 G/DL (ref 3.5–5.2)
ALP BLD-CCNC: 101 U/L (ref 40–129)
ALT SERPL-CCNC: 672 U/L (ref 0–40)
ANION GAP SERPL CALCULATED.3IONS-SCNC: 5 MMOL/L (ref 7–16)
ANISOCYTOSIS: ABNORMAL
AST SERPL-CCNC: 479 U/L (ref 0–39)
B.E.: 2.1 MMOL/L (ref -3–3)
B.E.: 3.6 MMOL/L (ref -3–3)
B.E.: 4.2 MMOL/L (ref -3–3)
B.E.: 4.5 MMOL/L (ref -3–0)
BASOPHILS ABSOLUTE: 0.03 E9/L (ref 0–0.2)
BASOPHILS RELATIVE PERCENT: 0.5 % (ref 0–2)
BILIRUB SERPL-MCNC: 0.7 MG/DL (ref 0–1.2)
BUN BLDV-MCNC: 40 MG/DL (ref 8–23)
BURR CELLS: ABNORMAL
CALCIUM SERPL-MCNC: 8.6 MG/DL (ref 8.6–10.2)
CHLORIDE BLD-SCNC: 104 MMOL/L (ref 98–107)
CO2: 34 MMOL/L (ref 22–29)
COHB: 1.4 % (ref 0–1.5)
COHB: 1.7 % (ref 0–1.5)
COHB: 1.8 % (ref 0–1.5)
COMMENT: ABNORMAL
CREAT SERPL-MCNC: 1.4 MG/DL (ref 0.7–1.2)
CRITICAL NOTIFICATION: YES
CRITICAL: ABNORMAL
CULTURE, RESPIRATORY: NORMAL
DATE ANALYZED: ABNORMAL
DATE OF COLLECTION: ABNORMAL
DELIVERY SYSTEMS: ABNORMAL
DEVICE: ABNORMAL
EOSINOPHILS ABSOLUTE: 0 E9/L (ref 0.05–0.5)
EOSINOPHILS RELATIVE PERCENT: 0 % (ref 0–6)
FIO2 ARTERIAL: 60
FIO2: 60 %
GFR AFRICAN AMERICAN: 59
GFR NON-AFRICAN AMERICAN: 59 ML/MIN/1.73
GLUCOSE BLD-MCNC: 109 MG/DL (ref 74–99)
GRAM STAIN ORDERABLE: NORMAL
HCO3 ARTERIAL: 36 MMOL/L (ref 22–26)
HCO3: 33.8 MMOL/L (ref 22–26)
HCO3: 33.9 MMOL/L (ref 22–26)
HCO3: 38.1 MMOL/L (ref 22–26)
HCT (EST): 43 % (ref 37–54)
HCT VFR BLD CALC: 45.4 % (ref 37–54)
HEMOGLOBIN: 12.8 G/DL (ref 12.5–16.5)
HGB, (EST): 14.6 G/DL (ref 12.5–15.5)
HHB: 4.2 % (ref 0–5)
HHB: 8.6 % (ref 0–5)
HHB: 8.7 % (ref 0–5)
IMMATURE GRANULOCYTES #: 0.26 E9/L
IMMATURE GRANULOCYTES %: 4.2 % (ref 0–5)
LAB: ABNORMAL
LYMPHOCYTES ABSOLUTE: 0.18 E9/L (ref 1.5–4)
LYMPHOCYTES RELATIVE PERCENT: 2.9 % (ref 20–42)
Lab: ABNORMAL
MCH RBC QN AUTO: 26.3 PG (ref 26–35)
MCHC RBC AUTO-ENTMCNC: 28.2 % (ref 32–34.5)
MCV RBC AUTO: 93.2 FL (ref 80–99.9)
METHB: 0.3 % (ref 0–1.5)
METHB: 0.4 % (ref 0–1.5)
METHB: 0.4 % (ref 0–1.5)
MODE: ABNORMAL
MONOCYTES ABSOLUTE: 0.84 E9/L (ref 0.1–0.95)
MONOCYTES RELATIVE PERCENT: 13.5 % (ref 2–12)
NEUTROPHILS ABSOLUTE: 4.9 E9/L (ref 1.8–7.3)
NEUTROPHILS RELATIVE PERCENT: 78.9 % (ref 43–80)
O2 CONTENT: 17 ML/DL
O2 CONTENT: 17.4 ML/DL
O2 CONTENT: 17.5 ML/DL
O2 SATURATION: 88.9 % (ref 92–98.5)
O2 SATURATION: 91.1 % (ref 92–98.5)
O2 SATURATION: 91.2 % (ref 92–98.5)
O2 SATURATION: 95.7 % (ref 92–98.5)
O2HB: 89.2 % (ref 94–97)
O2HB: 89.3 % (ref 94–97)
O2HB: 94 % (ref 94–97)
OPERATOR ID: 1221
OPERATOR ID: 1868
OPERATOR ID: 2067
OPERATOR ID: 405
OVALOCYTES: ABNORMAL
PATIENT TEMP: 37
PATIENT TEMP: 37 C
PCO2 (TEMP CORRECTED): 91 MMHG (ref 35–45)
PCO2: 130.8 MMHG (ref 35–45)
PCO2: 77.3 MMHG (ref 35–45)
PCO2: 98.1 MMHG (ref 35–45)
PDW BLD-RTO: 23.7 FL (ref 11.5–15)
PEEP/CPAP: 8 CMH2O
PFO2: 1.19 MMHG/%
PH (TEMPERATURE CORRECTED): 7.21 (ref 7.35–7.45)
PH BLOOD GAS: 7.08 (ref 7.35–7.45)
PH BLOOD GAS: 7.16 (ref 7.35–7.45)
PH BLOOD GAS: 7.26 (ref 7.35–7.45)
PLATELET # BLD: 120 E9/L (ref 130–450)
PMV BLD AUTO: ABNORMAL FL (ref 7–12)
PO2 (TEMP CORRECTED): 72.2 MMHG (ref 60–80)
PO2: 71.5 MMHG (ref 75–100)
PO2: 78.8 MMHG (ref 75–100)
PO2: 84.7 MMHG (ref 75–100)
POIKILOCYTES: ABNORMAL
POLYCHROMASIA: ABNORMAL
POSITIVE END EXP PRESS: 8 CMH2O
POTASSIUM REFLEX MAGNESIUM: 4.8 MMOL/L (ref 3.5–5)
PRO-BNP: 5945 PG/ML (ref 0–450)
RBC # BLD: 4.87 E12/L (ref 3.8–5.8)
REASON FOR REJECTION: NORMAL
REJECTED TEST: NORMAL
REPORT: NORMAL
RESPIRATORY RATE: 18 B/MIN
RI(T): 3.26
RR MECHANICAL: 12 B/MIN
SCHISTOCYTES: ABNORMAL
SMEAR, RESPIRATORY: NORMAL
SODIUM BLD-SCNC: 143 MMOL/L (ref 132–146)
SOURCE, BLOOD GAS: ABNORMAL
TARGET CELLS: ABNORMAL
THB: 13.1 G/DL (ref 11.5–16.5)
THB: 13.5 G/DL (ref 11.5–16.5)
THB: 13.9 G/DL (ref 11.5–16.5)
THIS TEST SENT TO: NORMAL
TIDAL VOLUME: 500 ML
TIME ANALYZED: 1103
TIME ANALYZED: 1557
TIME ANALYZED: 951
TOTAL PROTEIN: 7.6 G/DL (ref 6.4–8.3)
VT MECHANICAL: 500 ML
WBC # BLD: 6.2 E9/L (ref 4.5–11.5)

## 2021-02-19 PROCEDURE — 6370000000 HC RX 637 (ALT 250 FOR IP): Performed by: FAMILY MEDICINE

## 2021-02-19 PROCEDURE — 83880 ASSAY OF NATRIURETIC PEPTIDE: CPT

## 2021-02-19 PROCEDURE — 6370000000 HC RX 637 (ALT 250 FOR IP): Performed by: INTERNAL MEDICINE

## 2021-02-19 PROCEDURE — 80053 COMPREHEN METABOLIC PANEL: CPT

## 2021-02-19 PROCEDURE — 94640 AIRWAY INHALATION TREATMENT: CPT

## 2021-02-19 PROCEDURE — 6360000002 HC RX W HCPCS: Performed by: FAMILY MEDICINE

## 2021-02-19 PROCEDURE — 82805 BLOOD GASES W/O2 SATURATION: CPT

## 2021-02-19 PROCEDURE — 74018 RADEX ABDOMEN 1 VIEW: CPT

## 2021-02-19 PROCEDURE — 2060000000 HC ICU INTERMEDIATE R&B

## 2021-02-19 PROCEDURE — 85025 COMPLETE CBC W/AUTO DIFF WBC: CPT

## 2021-02-19 PROCEDURE — 74176 CT ABD & PELVIS W/O CONTRAST: CPT

## 2021-02-19 PROCEDURE — 82803 BLOOD GASES ANY COMBINATION: CPT

## 2021-02-19 PROCEDURE — 2700000000 HC OXYGEN THERAPY PER DAY

## 2021-02-19 PROCEDURE — 71045 X-RAY EXAM CHEST 1 VIEW: CPT

## 2021-02-19 PROCEDURE — 6360000002 HC RX W HCPCS: Performed by: INTERNAL MEDICINE

## 2021-02-19 PROCEDURE — 94660 CPAP INITIATION&MGMT: CPT

## 2021-02-19 PROCEDURE — 2580000003 HC RX 258: Performed by: INTERNAL MEDICINE

## 2021-02-19 PROCEDURE — 36415 COLL VENOUS BLD VENIPUNCTURE: CPT

## 2021-02-19 RX ORDER — HEPARIN SODIUM 10000 [USP'U]/100ML
5-30 INJECTION, SOLUTION INTRAVENOUS CONTINUOUS
Status: DISCONTINUED | OUTPATIENT
Start: 2021-02-19 | End: 2021-02-19

## 2021-02-19 RX ORDER — BISACODYL 10 MG
10 SUPPOSITORY, RECTAL RECTAL ONCE
Status: COMPLETED | OUTPATIENT
Start: 2021-02-19 | End: 2021-02-19

## 2021-02-19 RX ADMIN — ENOXAPARIN SODIUM 80 MG: 80 INJECTION SUBCUTANEOUS at 22:31

## 2021-02-19 RX ADMIN — TROSPIUM CHLORIDE 20 MG: 20 TABLET, FILM COATED ORAL at 16:50

## 2021-02-19 RX ADMIN — Medication 10 ML: at 10:23

## 2021-02-19 RX ADMIN — BISACODYL 10 MG: 10 SUPPOSITORY RECTAL at 14:23

## 2021-02-19 RX ADMIN — TROSPIUM CHLORIDE 20 MG: 20 TABLET, FILM COATED ORAL at 05:35

## 2021-02-19 RX ADMIN — IPRATROPIUM BROMIDE AND ALBUTEROL SULFATE 1 AMPULE: 2.5; .5 SOLUTION RESPIRATORY (INHALATION) at 12:53

## 2021-02-19 RX ADMIN — IPRATROPIUM BROMIDE AND ALBUTEROL SULFATE 1 AMPULE: 2.5; .5 SOLUTION RESPIRATORY (INHALATION) at 08:43

## 2021-02-19 RX ADMIN — ARFORMOTEROL TARTRATE 15 MCG: 15 SOLUTION RESPIRATORY (INHALATION) at 22:18

## 2021-02-19 RX ADMIN — Medication 10 ML: at 10:24

## 2021-02-19 RX ADMIN — IPRATROPIUM BROMIDE AND ALBUTEROL SULFATE 1 AMPULE: 2.5; .5 SOLUTION RESPIRATORY (INHALATION) at 22:18

## 2021-02-19 RX ADMIN — IPRATROPIUM BROMIDE AND ALBUTEROL SULFATE 1 AMPULE: 2.5; .5 SOLUTION RESPIRATORY (INHALATION) at 17:03

## 2021-02-19 RX ADMIN — BUDESONIDE 500 MCG: 0.5 SUSPENSION RESPIRATORY (INHALATION) at 10:18

## 2021-02-19 RX ADMIN — POLYETHYLENE GLYCOL 3350 17 G: 17 POWDER, FOR SOLUTION ORAL at 05:37

## 2021-02-19 RX ADMIN — Medication 10 ML: at 22:32

## 2021-02-19 RX ADMIN — ARFORMOTEROL TARTRATE 15 MCG: 15 SOLUTION RESPIRATORY (INHALATION) at 08:43

## 2021-02-19 RX ADMIN — BUDESONIDE 500 MCG: 0.5 SUSPENSION RESPIRATORY (INHALATION) at 08:43

## 2021-02-19 ASSESSMENT — PAIN SCALES - GENERAL
PAINLEVEL_OUTOF10: 0
PAINLEVEL_OUTOF10: 0

## 2021-02-19 NOTE — PROGRESS NOTES
Nay Loja M.D.,[facility]  Mauro Bingham D.O., F.A.C.O.I., Angelina Warren M.D. Cabrera Campbell M.D., Pancho Arboleda M.D. Williasm George D.O. Daily Pulmonary Progress Note    Patient:  Giulia Head 78 y.o. male MRN: [id number]     Date of Service: 2/19/2021        Subjective      Patient was seen and examined. severe pulmonary decomp Dr. Andreas Lockhart at bedside, abg with severe hypercapnic respiratory failure, place on bipap , had increasing in alertness , low sat 80's increased fio2 to 50% . Stat cxr done no pneumo, but large air bubbles,, pts sister who is at bedside reports pt had severe constipation at home and could only have abm with the help of enema. Will get kub.          Objective   Vitals: BP (!) 153/86   Pulse 99   Temp 97.4 °F (36.3 °C) (Oral)   Resp 20   Ht 6' 1.5\" (1.867 m)   Wt 174 lb (78.9 kg)   SpO2 90%   BMI 22.65 kg/m²     I/O:    Intake/Output Summary (Last 24 hours) at 2/19/2021 0943  Last data filed at 2/19/2021 0548  Gross per 24 hour   Intake 1513 ml   Output 1100 ml   Net 413 ml       CURRENT MEDS :  Scheduled Meds:   sodium chloride flush  10 mL Intravenous 2 times per day    apixaban  10 mg Oral BID    Followed by   Sri Gay ON 2/25/2021] apixaban  5 mg Oral BID    ipratropium-albuterol  1 ampule Inhalation Q4H While awake    guaiFENesin  400 mg Oral TID    sodium chloride flush  10 mL Intravenous 2 times per day    amLODIPine  5 mg Oral Daily    baclofen  20 mg Oral Nightly    vitamin D  2,000 Units Oral Daily    trospium  20 mg Oral BID AC    sodium chloride flush  10 mL Intravenous 2 times per day    budesonide  0.5 mg Nebulization BID    And    Arformoterol Tartrate  15 mcg Nebulization BID       Continuous Infusions:   sodium chloride 75 mL/hr at 02/18/21 1951       PRN Meds: sodium chloride flush, perflutren lipid microspheres, sodium chloride flush, albuterol, sodium chloride flush, promethazine **OR** ondansetron, polyethylene glycol      Physical Exam:  Physical Exam  Constitutional:       General: He is not in acute distress. Appearance: He is underweight. HENT:      Head: Normocephalic and atraumatic. Mouth/Throat:      Pharynx: No oropharyngeal exudate. Eyes:      General: No scleral icterus. Conjunctiva/sclera: Conjunctivae normal.   Neck:      Musculoskeletal: Neck supple. Trachea: No tracheal deviation. Cardiovascular:      Rate and Rhythm: Normal rate. Heart sounds: Normal heart sounds. Pulmonary:      Effort: Pulmonary effort is normal.      Breath sounds: Examination of the right-lower field reveals decreased breath sounds. Examination of the left-lower field reveals decreased breath sounds. Decreased breath sounds present. Abdominal:      Palpations: Abdomen is soft. Tenderness: There is no abdominal tenderness. Musculoskeletal:         General: Swelling present. No deformity. Lymphadenopathy:      Cervical: No cervical adenopathy. Skin:     General: Skin is warm. Findings: No rash. Neurological:      General: No focal deficit present. Mental Status: He is alert and oriented to person, place, and time. Psychiatric:         Behavior: Behavior normal.         Pertinent/ New Labs and Imaging Studies     Pulmonary Function Testing personally reviewed and interpreted. PERTINENT LAB RESULTS: Labs reviewed. Results for Ernestina Bateman (MRN [id number]) as of 2/19/2021 09:43   Ref.  Range 2/18/2021 10:05   Source + CELL CT/DIFF-BF Unknown Pleural   Appearance, Fluid Unknown Cloudy   Color, Fluid Unknown Pink   Fluid Type Unknown Pleural   Glucose, Fluid Latest Ref Range: Not Established mg/dL 129   LD, Fluid Latest Ref Range: Not Established U/L    RBC, Fluid Latest Units: /uL 31,000 Monocyte Count, Fluid Latest Units: % 39   Neutrophil Count, Fluid Latest Units: % 61   Nucl Cell, Fluid Latest Units: /uL 1,384   Protein, Fluid Latest Ref Range: Not Established g/dL 2.6       DIAGNOSTICS: Pertinent imaging reviewed. EXAMINATION:   ONE XRAY VIEW OF THE CHEST   2/18/2021 11:11 am   COMPARISON:   None   HISTORY:   ORDERING SYSTEM PROVIDED HISTORY: pleural effusions   TECHNOLOGIST PROVIDED HISTORY:   Reason for exam:->pleural effusions   What reading provider will be dictating this exam?->CRC   FINDINGS:   The cardiac silhouette is enlarged.  No findings of failure. Patchy bilateral perihilar and lower lobe infiltrates are noted.  There is no   pleural effusion.       Impression   1. Patchy bilateral perihilar and lower lobe infiltrates. 2. Cardiomegaly. 3. Gross pleural effusions are not appreciated on the portable chest x-ray. Results for Kathryn Dos Santos (MRN [id number]) as of 2/19/2021 10:54   Ref. Range 2/19/2021 09:51   Source: Unknown Blood Arterial   pH, Blood Gas Latest Ref Range: 7.350 - 7.450  7.082 (LL)   PCO2 Latest Ref Range: 35.0 - 45.0 mmHg 130.8 (HH)   pO2 Latest Ref Range: 75.0 - 100.0 mmHg 78.8   HCO3 Latest Ref Range: 22.0 - 26.0 mmol/L 38.1 (H)   Base Excess Latest Ref Range: -3.0 - 3.0 mmol/L 3.6 (H)   O2 Sat Latest Ref Range: 92.0 - 98.5 % 91.2 (L)   tHb (est) Latest Ref Range: 11.5 - 16.5 g/dL 13.9   O2Hb Latest Ref Range: 94.0 - 97.0 % 89.3 (L)   COHb Latest Ref Range: 0.0 - 1.5 % 1.7 (H)   MetHb Latest Ref Range: 0.0 - 1.5 % 0.4   HHb Latest Ref Range: 0.0 - 5.0 % 8.6 (H)   O2 Content Latest Units: mL/dL 17.5   Pt Temp Latest Units: C 37.0   Critical(s) Notified Unknown Handed report to Dr/RN   Time Analyzed Unknown 0975   Mode Unknown HFNC  15 L        Assessment:      1. Acute b/l pulmonary embolism  *PE in setting of malignancy. Filling defect in segmental and subsegmental branches of RML and LLL. US negative for DVT.     2. Acute on chronic respiratory failure 2nd to acute PE, patchy infiltrates, and effusions. *Chronic resp failure on 2L, however patient noncompliant with home O2 and refuses to wear despite counseling multiple times.     3. Bilateral pleural effusions L>R with atelectasis/infiltrate. - s/p left thoracentesis 250 cc removed 2/18, transudative fluid  Possible pneumonia. Bibasilar infiltrate may be associated atelectasis or pneumonic process. 4. COPD with bullous emphysema  *Hx of tobacco use 55 py's and multiple occulational exposures. Moderate obstruction on PFT 5/2019, FEV1 55%     5. 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 Had good response based on imaging. Repeat imaging 3/2020 showing new lingular nodule. Underwent SBRT again.      6. History prostate CA 2009 treated with XRT     7. Hx of tobacco use. 55 py history. 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     8. Non-compliance with wearing home O2.    9. Bactermia, possible contaminant      Plan:     · Continue supplemental O2 to maintain SpO2 88 - 92%, 6 l  90%   · Pleural fluid transudate, await pleural fluid culture  ·  Eliquis will transition will start lovenox due to ams . Will need long-term anticoagulation given malignancy   · 2D ECHO  · Continue aerosol bronchodilators with Pulmicort, Brovana, DuoNeb every 4 while awake and as needed  · Bronchopulmonary hygiene, Mucinex, flutter/pep, EZ Pap  · abx per ID  · Stat CXR , KUB , ABG , pt sister reports pt unable to have bm at home for some time was using enema for bm. Thank you for allowing me to participate in the care of Manuel Richards. Please feel free to call with questions.      Electronically signed by ANGEL Varma on 2/19/2021 at 9:43 AM I personally saw, examined, and cared for the patient. Labs, medications, radiographs reviewed. I agree with history exam and plans detailed in NP note. Patient became more lethargic this morning. ABG showed significant hypercapnic respiratory failure. Patient placed on NIV with AVAPS with improvement in ABG as well as his mentation. Chest x-ray revealed nonspecific gas pattern of abdomen. Suspect abdominal process causing restrictive pulmonary impairment.    -Continue NIV  -Repeat ABG in a couple hours in a.m.  -Check CT abdomen pelvis    If worsens then would need transfer to ICU.     Electronically signed by Samanta Ellison DO on 2/19/2021 at 1:44 PM

## 2021-02-19 NOTE — PROGRESS NOTES
Hospitalist Progress Note      PCP: Neeta Alegria MD    Date of Admission: 2/16/2021    Chief Complaint: [address] Course: 78y.o. 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      Patient presented to emergency department complaints of altered mental status and shortness of breath. Patient with a history of lung cancer and COPD. Chronically on 3 L of home O2. Has shortness of breath at baseline but now is worse. Denies fever, chills, nausea, vomiting. Ports a productive cough. Vital signs assessed emergency department within normal limits and stable. Patient is 98% on 3 L nasal cannula. Laboratory studies of note creatinine 1.6, , , bilirubin 1.3, D-dimer 5250, urinalysis with trace leukoesterase. Chest x-ray shows advanced COPD with patchy bibasilar infiltrates concerning for pneumonia. CTA pulmonary reveals filling defect in the segmental and subsegmental branches of the right middle lobe and left lower lobe concerning for bilateral pulmonary embolism without significant cardiac strain. Pt is  status post thoracentesis with removal of 250 cc fluid. Patient was found to be bacteremic and infectious disease consulted  For night patient become lethargic and develop acute hypercapnic respiratory failure, pulmonology came to evaluate patient and placed patient on BiPAP stat chest x-ray and KUB ordered and pending    Subjective: Patient seen and examined by me personally . Currently on BiPAP for acute hypercapnic respiratory failure, pulmonology at bedside patient has decreased breath sound Right more than left.   Chest x-ray and KUB ordered and pending    Medications:  Reviewed    Infusion Medications     Scheduled Medications  enoxaparin  1 mg/kg Subcutaneous BID    sodium chloride flush  10 mL Intravenous 2 times per day    ipratropium-albuterol  1 ampule Inhalation Q4H While awake    guaiFENesin  400 mg Oral TID    sodium chloride flush  10 mL Intravenous 2 times per day    amLODIPine  5 mg Oral Daily    baclofen  20 mg Oral Nightly    vitamin D  2,000 Units Oral Daily    trospium  20 mg Oral BID AC    sodium chloride flush  10 mL Intravenous 2 times per day    budesonide  0.5 mg Nebulization BID    And    Arformoterol Tartrate  15 mcg Nebulization BID     PRN Meds: sodium chloride flush, perflutren lipid microspheres, sodium chloride flush, albuterol, sodium chloride flush, promethazine **OR** ondansetron, polyethylene glycol      Intake/Output Summary (Last 24 hours) at 2/19/2021 1121  Last data filed at 2/19/2021 0548  Gross per 24 hour   Intake 1513 ml   Output 1100 ml   Net 413 ml       Exam:    BP (!) 153/86   Pulse 99   Temp 97.4 °F (36.3 °C) (Oral)   Resp 28   Ht 6' 1.5\" (1.867 m)   Wt 174 lb (78.9 kg)   SpO2 92%   BMI 22.65 kg/m²     General appearance:   ill appearance with lethargy  Neck: Supple, with full range of motion. No jugular venous distention. Trachea midline. Respiratory: decrease BS BL with Rales/Wheezes/Rhonchi. on BiPAP  Cardiovascular: Regular rate and rhythm with normal S1/S2 without murmurs, rubs or gallops. Abdomen: Soft, non-tender, non-distended with normal bowel sounds. Musculoskeletal: No clubbing, cyanosis or edema bilaterally. Full range of motion without deformity. Skin: Skin color, texture, turgor normal.  No rashes or lesions.   Neurologic: Patient is lethargic on BiPAP      Labs:   Recent Labs     02/17/21  0510 02/18/21  0511 02/19/21  0517   WBC 7.7 7.6 6.2   HGB 12.8 12.3* 12.8   HCT 43.1 41.4 45.4   PLT 90* 88* 120*     Recent Labs     02/16/21  1646 02/17/21  0510 02/18/21  0511    138 143   K 4.8 5.0 5.0  5.0    102 103   CO2 26 31* 34*   BUN 43* 48* 47* CREATININE 1.6* 1.6* 1.2   CALCIUM 8.3* 8.4* 8.4*     Recent Labs     02/16/21  1646 02/17/21  0510 02/18/21  0511   * 1,801* 868*   * 1,272* 927*   BILITOT 1.3* 0.8 0.5   ALKPHOS 81 94 89     No results for input(s): INR in the last 72 hours. Recent Labs     02/16/21  1646   TROPONINI 0.03       Assessment/Plan:    Active Hospital Problems    Diagnosis Date Noted    Pulmonary embolism ([facility] [address] [I26.99] 02/16/2021        ASSESSMENT:    Bilateral pulmonary emboli  Patient started on Lovenox 1 mg/kg daily   switched to Eliquis for PE treatment when awake and able to swallow   Consult pulmonology  Patient is lethargic will placed on BIPAP    Community-acquired pneumonia  With underlying COPD  Patient started on IV antibiotics with ceftriaxone and azithromycin  Per ID IV AB was DC   Chronically oxygen dependent  Chronic respiratory failure on 3 Lat home      Bacteremia/sepsis on admission with lactic acidosis  Blood culture came back positive-Gram-positive cocci (methicillin-resistant Staphylococcus species not aureus by PCR) in blood cultures, probably contaminant  Consult ID for IV antibiotic management- DC AB per ID - monitor pt without AB per ID recommendation     Urinary tract infection  Continue IV fluid resuscitation  Per ID  IV antibiotics was dc   Awaiting for urine culture results    Chronic renal insufficiency stage III  Continue IV fluid resuscitation  Avoid nephrotoxins     Transaminates  AST/ ALT increase 2 times since admission  Will order hepatic panel hepatitis BC  Right upper quadrant ultrasound  Impression   1. Mild hydronephrosis on the left.  Please correlate with patient's lab   values. 2.  Complicated cystic structure measuring 2.1 cm in the lower pole of the   right kidney. (Follow-up renal ultrasound in 3-6 months suggested to reassess.)   3.  Small amount of free fluid in the left upper quadrant. 4.  Remainder of the study is as above.      DVT Prophylaxis: On Lovenox Diet: DIET GENERAL;  Code Status: Full Code    PT/OT Eval Status: NA     Dispo -based on clinical improvement    Steve Coello MD

## 2021-02-19 NOTE — PROGRESS NOTES
Occupational Therapy  OT SESSION ATTEMPT     Date:2021  Patient Name: Micheal Ma  MRN: [id number]  : 1940  Room: [address] [place] [address]A     Attempted OT session this date:    [] unavailable due to other medical staff currently with pt   [] on hold per nursing staff   [x] on hold per nursing staff secondary to medical status. [] politely declined treatment this date due to   [] off unit    [] Other:     Will reattempt OT session at a later time.       Eric Cadet [address], [address]

## 2021-02-19 NOTE — PROGRESS NOTES
Date: 2/19/2021    Time: 10:27 AM    Patient Placed On BIPAP/CPAP/ Non-Invasive Ventilation? Yes    If no must comment. Facial area red/color change? No           If YES are Blister/Lesion present? No   If yes must notify nursing staff  BIPAP/CPAP skin barrier?   Yes    Skin barrier type:mepilexlite       Comments:  Patient placed on bipap per Dr. Des Villarreal after blood gas results    Divyaernie Pablo

## 2021-02-19 NOTE — PLAN OF CARE
Patient continues to pull off bi pap, monitor and attempts to get out of bed. 4 pt restraints remain in place for patient safety.  Will continue to monitor

## 2021-02-19 NOTE — PROGRESS NOTES
[facility] PROGRESS NOTE      Chief complaint: Follow-up of Gram-positive cocci in blood cultures     The patient is a 78 y.o. male with history of COPD, non-small cell lung cancer status post radiation therapy complicated by radiation fibrosis, chronic respiratory failure on continuous 3 L/min oxygen, hypertension, presented on 02/16 with worsening shortness of breath found to have pulmonary embolism. On admission, he was afebrile and hemodynamically stable with no leukocytosis. Chest CTA showed right middle lobe and left lower lobe bilateral pulmonary embolism, advanced COPD with patchy bibasilar infiltrates and pleural effusion, 2 x 1.5 cm nodule in the lingula marginally increased and indeterminate for recurrent malignancy. CT abdomen and pelvis showed heterogenous liver concerning for liver disease, progressive abdominal aortic aneurysm measuring 5 x 4.1 cm with peripheral clot, partially distended bladder with wall thickening, slight thickening of colonic wall. Serum procalcitonin level was elevated at 3.13 ng/mL. SARS-CoV-2 PCR and urine Streptococcus pneumoniae and Legionella antigens were negative. Respiratory Gram stain and culture showed more than 25 PMNs per LPF and more than 10 epithelial cells per LPF, rare gram-positive cocci in pairs, no growth to date. Blood cultures showed gram-positive cocci in chains and Staphylococcus haemolyticus and Streptococcus sp. He underwent thoracentesis on 02/18 during which 250 mL of red-tinged pleural fluid (transudative by protein) was removed. He received a dose of piperacillin-tazobactam and azithromycin on admission. Ceftriaxone was started on 02/17. Subjective: Patient was seen and examined. No chills, no abdominal pain, no diarrhea, no rash, no itching. He is upset on having to wear BiPAP and had to be put on restraints.     Objective:    Vitals:    02/19/21 1400   BP: (!) 144/88   Pulse: 98   Resp: 24   Temp: 96.8 °F (36 °C)   SpO2: 94% CT abdomen pelvis: Constipation with mild nonspecific thickening of the colonic wall. Mild thickening of the urinary bladder.  Cystitis is considered. Abdominal aortic aneurysm measuring 4.1 x 5 cm. Microbiology:     Blood cx  No results found for: BLOODCULT1  Culture, Blood 2   Date Value Ref Range Status   02/17/2021 24 Hours no growth  Preliminary   02/16/2021 (A)  Preliminary    Gram stain performed from blood culture bottle media  Gram positive cocci in clusters     02/16/2021 Identification and sensitivity to follow  Preliminary     Organism   Date Value Ref Range Status   02/16/2021 Streptococcus species (A)  Preliminary   02/16/2021 Staphylococcus haemolyticus (A)  Preliminary       Smear, Respiratory   Date Value Ref Range Status   02/17/2021   Final    Group 3: >25 PMN's/LPF and >10 Epithelial cells/LPF  Moderate Polymorphonuclear leukocytes  Few Epithelial cells  Rare Gram positive cocci in pairs         CULTURE, RESPIRATORY   Date Value Ref Range Status   02/17/2021 Oral Pharyngeal Kin reduced  Final     BODY FLUID CULTURE  Body Fluid Culture, Sterile   Date Value Ref Range Status   02/18/2021 Growth not present, incubation continues  Preliminary       URINE CULTURE  Urine Culture, Routine   Date Value Ref Range Status   02/16/2021 Growth not present  Final   05/18/2020 Growth not present  Final   02/24/2020 (A)  Final    ,000 CFU/mL  Mixed kin isolated. Further workup and sensitivity testing  is not routinely indicated and will not be performed.   Mixed kin isolated includes:  Gram negative rods  Gram positive organism     02/24/2020 >100,000 CFU/ml  Final       MRSA Culture Only   Date Value Ref Range Status   02/17/2021 Methicillin resistant Staph aureus not isolated  Final       Assessment:  Staphylococcus haemolyticus and Streptococcus sp in blood cultures, probably contaminant  Pulmonary embolism  Acute on chronic respiratory failure Pleural effusion, status post left thoracentesis on 02/18    Recommendations: Follow up blood and pleural fluid cultures. Monitor clinically off antibiotics for now. Trend procalcitonin. Continue supportive care.     Thank you for involving me in the care of Sherie Lowe. Dr. Jazmine Perez will continue to follow. Please do not hesitate to call for any questions or concerns.     Electronically signed by Magui Ruiz MD on 2/19/2021 at 10:31 AM

## 2021-02-19 NOTE — PROGRESS NOTES
abg results perfect served to Dr. Chip Rodriguez    [id number] with Dr. Chip Rodriguez, will adjust AVAPs and repeat gases in 1 hour. If no improvement patient will need to transfer to ICU. Respiratory notified of setting changes.

## 2021-02-19 NOTE — PROGRESS NOTES
Patient with increase in lethargy and shortness of breath, saturations were 68% on 6 liters, patient placed on 15 liters high flow and still took awhile to respond. Dr. Josiah Mccain at bedside, Bi Pap ordered as well as abgs and stat Chest xray. ABGs drawn from right radial, xray department called for STAT CXR and respiratory at bedside for AVAPS. Patient's sister at bedside and updated as well. Continuous pulse ox placed patient currently sating at 97% on AVAPs. Will continue to monitor.

## 2021-02-19 NOTE — PROGRESS NOTES
Patient on AVAPS of 18 550 70% 8+    Increased set VT from 500 to 550 after abg results per Dr. Corine Mcardle. Increased FiO2 from 60% to 70% because patient SpO2 was 89%.

## 2021-02-19 NOTE — PROGRESS NOTES
Physical Therapy    Pt chart reviewed to attempt session. Case discussed with RN who reports pt is not medically appropriate for therapy this date due to poor respiratory status. PT will follow and attempt again at later time/date as appropriate. Thank you.     Juan Manuel Franklin, PT, DPT  [id number]

## 2021-02-19 NOTE — PROGRESS NOTES
Dr. Kasey stover served to see if we are able to get order for bilateral wrist restraints, patient will not leave on AVAPs after being directed multiple times.

## 2021-02-19 NOTE — PROGRESS NOTES
Per Dr. Dickson Huston pt can stay on current AVAP settings. If he is very alert he may take small break off.  Otherwise keep him on mask and he needs it on for night

## 2021-02-20 LAB
AADO2: 261.4 MMHG
ALBUMIN SERPL-MCNC: 2.9 G/DL (ref 3.5–5.2)
ALP BLD-CCNC: 81 U/L (ref 40–129)
ALT SERPL-CCNC: 468 U/L (ref 0–40)
ANION GAP SERPL CALCULATED.3IONS-SCNC: 6 MMOL/L (ref 7–16)
ANISOCYTOSIS: ABNORMAL
AST SERPL-CCNC: 327 U/L (ref 0–39)
B.E.: 6.9 MMOL/L (ref -3–3)
BASOPHILS ABSOLUTE: 0 E9/L (ref 0–0.2)
BASOPHILS RELATIVE PERCENT: 0.9 % (ref 0–2)
BILIRUB SERPL-MCNC: 0.5 MG/DL (ref 0–1.2)
BODY FLUID CULTURE, STERILE: NORMAL
BUN BLDV-MCNC: 36 MG/DL (ref 8–23)
CALCIUM SERPL-MCNC: 8.6 MG/DL (ref 8.6–10.2)
CHLORIDE BLD-SCNC: 105 MMOL/L (ref 98–107)
CO2: 35 MMOL/L (ref 22–29)
COHB: 1.3 % (ref 0–1.5)
CREAT SERPL-MCNC: 1.3 MG/DL (ref 0.7–1.2)
CRITICAL: ABNORMAL
DATE ANALYZED: ABNORMAL
DATE OF COLLECTION: ABNORMAL
EOSINOPHILS ABSOLUTE: 0 E9/L (ref 0.05–0.5)
EOSINOPHILS RELATIVE PERCENT: 0.2 % (ref 0–6)
FIO2: 60 %
GFR AFRICAN AMERICAN: >60
GFR NON-AFRICAN AMERICAN: >60 ML/MIN/1.73
GLUCOSE BLD-MCNC: 79 MG/DL (ref 74–99)
GRAM STAIN RESULT: NORMAL
HCO3: 35.8 MMOL/L (ref 22–26)
HCT VFR BLD CALC: 39.6 % (ref 37–54)
HEMOGLOBIN: 11.6 G/DL (ref 12.5–16.5)
HHB: 7.1 % (ref 0–5)
LAB: ABNORMAL
LV EF: 57 %
LVEF MODALITY: NORMAL
LYMPHOCYTES ABSOLUTE: 0.06 E9/L (ref 1.5–4)
LYMPHOCYTES RELATIVE PERCENT: 0.9 % (ref 20–42)
Lab: ABNORMAL
MCH RBC QN AUTO: 25.9 PG (ref 26–35)
MCHC RBC AUTO-ENTMCNC: 29.3 % (ref 32–34.5)
MCV RBC AUTO: 88.4 FL (ref 80–99.9)
METHB: 0.3 % (ref 0–1.5)
MODE: ABNORMAL
MONOCYTES ABSOLUTE: 0.58 E9/L (ref 0.1–0.95)
MONOCYTES RELATIVE PERCENT: 9.6 % (ref 2–12)
NEUTROPHILS ABSOLUTE: 5.22 E9/L (ref 1.8–7.3)
NEUTROPHILS RELATIVE PERCENT: 89.6 % (ref 43–80)
NUCLEATED RED BLOOD CELLS: 0.9 /100 WBC
O2 CONTENT: 16.5 ML/DL
O2 SATURATION: 92.8 % (ref 92–98.5)
O2HB: 91.3 % (ref 94–97)
OPERATOR ID: ABNORMAL
OVALOCYTES: ABNORMAL
PATIENT TEMP: 37 C
PCO2: 74.3 MMHG (ref 35–45)
PDW BLD-RTO: 22.1 FL (ref 11.5–15)
PEEP/CPAP: 8 CMH2O
PFO2: 1.16 MMHG/%
PH BLOOD GAS: 7.3 (ref 7.35–7.45)
PLATELET # BLD: 64 E9/L (ref 130–450)
PLATELET CONFIRMATION: NORMAL
PMV BLD AUTO: ABNORMAL FL (ref 7–12)
PO2: 69.7 MMHG (ref 75–100)
POIKILOCYTES: ABNORMAL
POLYCHROMASIA: ABNORMAL
POTASSIUM REFLEX MAGNESIUM: 5 MMOL/L (ref 3.5–5)
POTASSIUM SERPL-SCNC: 5 MMOL/L (ref 3.5–5)
PROCALCITONIN: 0.92 NG/ML (ref 0–0.08)
RBC # BLD: 4.48 E12/L (ref 3.8–5.8)
RI(T): 3.75
RR MECHANICAL: 18 B/MIN
SODIUM BLD-SCNC: 146 MMOL/L (ref 132–146)
SOURCE, BLOOD GAS: ABNORMAL
TARGET CELLS: ABNORMAL
THB: 12.8 G/DL (ref 11.5–16.5)
TIME ANALYZED: 553
TOTAL PROTEIN: 6.2 G/DL (ref 6.4–8.3)
VT MECHANICAL: 550 ML
WBC # BLD: 5.8 E9/L (ref 4.5–11.5)

## 2021-02-20 PROCEDURE — 6370000000 HC RX 637 (ALT 250 FOR IP): Performed by: INTERNAL MEDICINE

## 2021-02-20 PROCEDURE — 36415 COLL VENOUS BLD VENIPUNCTURE: CPT

## 2021-02-20 PROCEDURE — 82805 BLOOD GASES W/O2 SATURATION: CPT

## 2021-02-20 PROCEDURE — 80053 COMPREHEN METABOLIC PANEL: CPT

## 2021-02-20 PROCEDURE — 99221 1ST HOSP IP/OBS SF/LOW 40: CPT | Performed by: STUDENT IN AN ORGANIZED HEALTH CARE EDUCATION/TRAINING PROGRAM

## 2021-02-20 PROCEDURE — 6370000000 HC RX 637 (ALT 250 FOR IP): Performed by: FAMILY MEDICINE

## 2021-02-20 PROCEDURE — 84145 PROCALCITONIN (PCT): CPT

## 2021-02-20 PROCEDURE — 6370000000 HC RX 637 (ALT 250 FOR IP): Performed by: STUDENT IN AN ORGANIZED HEALTH CARE EDUCATION/TRAINING PROGRAM

## 2021-02-20 PROCEDURE — 85025 COMPLETE CBC W/AUTO DIFF WBC: CPT

## 2021-02-20 PROCEDURE — 6360000002 HC RX W HCPCS: Performed by: FAMILY MEDICINE

## 2021-02-20 PROCEDURE — 6360000002 HC RX W HCPCS: Performed by: INTERNAL MEDICINE

## 2021-02-20 PROCEDURE — 94640 AIRWAY INHALATION TREATMENT: CPT

## 2021-02-20 PROCEDURE — 2060000000 HC ICU INTERMEDIATE R&B

## 2021-02-20 PROCEDURE — 94660 CPAP INITIATION&MGMT: CPT

## 2021-02-20 PROCEDURE — 2700000000 HC OXYGEN THERAPY PER DAY

## 2021-02-20 PROCEDURE — 2580000003 HC RX 258: Performed by: FAMILY MEDICINE

## 2021-02-20 PROCEDURE — 2580000003 HC RX 258: Performed by: INTERNAL MEDICINE

## 2021-02-20 PROCEDURE — 93306 TTE W/DOPPLER COMPLETE: CPT

## 2021-02-20 RX ORDER — BISACODYL 10 MG
10 SUPPOSITORY, RECTAL RECTAL EVERY 6 HOURS
Status: DISCONTINUED | OUTPATIENT
Start: 2021-02-20 | End: 2021-02-25

## 2021-02-20 RX ADMIN — Medication 10 ML: at 10:35

## 2021-02-20 RX ADMIN — BISACODYL 10 MG: 10 SUPPOSITORY RECTAL at 21:06

## 2021-02-20 RX ADMIN — BUDESONIDE 500 MCG: 0.5 SUSPENSION RESPIRATORY (INHALATION) at 20:19

## 2021-02-20 RX ADMIN — IPRATROPIUM BROMIDE AND ALBUTEROL SULFATE 1 AMPULE: 2.5; .5 SOLUTION RESPIRATORY (INHALATION) at 15:50

## 2021-02-20 RX ADMIN — IPRATROPIUM BROMIDE AND ALBUTEROL SULFATE 1 AMPULE: 2.5; .5 SOLUTION RESPIRATORY (INHALATION) at 20:19

## 2021-02-20 RX ADMIN — IPRATROPIUM BROMIDE AND ALBUTEROL SULFATE 1 AMPULE: 2.5; .5 SOLUTION RESPIRATORY (INHALATION) at 11:47

## 2021-02-20 RX ADMIN — Medication 10 ML: at 21:07

## 2021-02-20 RX ADMIN — ENOXAPARIN SODIUM 80 MG: 80 INJECTION SUBCUTANEOUS at 10:35

## 2021-02-20 RX ADMIN — GUAIFENESIN 400 MG: 400 TABLET ORAL at 21:06

## 2021-02-20 RX ADMIN — ARFORMOTEROL TARTRATE 15 MCG: 15 SOLUTION RESPIRATORY (INHALATION) at 20:19

## 2021-02-20 RX ADMIN — BISACODYL 10 MG: 10 SUPPOSITORY RECTAL at 10:35

## 2021-02-20 RX ADMIN — ENOXAPARIN SODIUM 80 MG: 80 INJECTION SUBCUTANEOUS at 21:09

## 2021-02-20 RX ADMIN — BACLOFEN 20 MG: 10 TABLET ORAL at 21:06

## 2021-02-20 ASSESSMENT — PAIN SCALES - GENERAL
PAINLEVEL_OUTOF10: 0
PAINLEVEL_OUTOF10: 0

## 2021-02-20 NOTE — PLAN OF CARE
Problem: Non-Violent Restraints  Goal: Removal from restraints as soon as assessed to be safe  2/20/2021 0431 by Charlene Dejesus RN  Reactivated  2/20/2021 0430 by Charlene Dejesus RN  Outcome: Completed  2/20/2021 0430 by Charlene Dejesus RN  Outcome: Ongoing  Goal: No harm/injury to patient while restraints in use  2/20/2021 0431 by Charlene Dejesus RN  Reactivated  2/20/2021 0430 by Charlene Dejesus RN  Outcome: Completed  2/20/2021 0430 by Charlene Dejesus RN  Outcome: Ongoing  Goal: Patient's dignity will be maintained  2/20/2021 0431 by Charlene Dejesus RN  Reactivated  2/20/2021 0430 by Charlene Dejesus RN  Outcome: Completed  2/20/2021 0430 by Charlene Dejesus RN  Outcome: Ongoing     Problem: Skin Integrity:  Goal: Will show no infection signs and symptoms  Description: Will show no infection signs and symptoms  Outcome: Ongoing  Goal: Absence of new skin breakdown  Description: Absence of new skin breakdown  Outcome: Ongoing     Problem: Falls - Risk of:  Goal: Will remain free from falls  Description: Will remain free from falls  Outcome: Ongoing  Goal: Absence of physical injury  Description: Absence of physical injury  Outcome: Ongoing
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
## 2021-02-20 NOTE — PROGRESS NOTES
AILEEN PROGRESS NOTE      Chief complaint: Follow-up of Gram-positive cocci in blood cultures     The patient is a 78 y.o. male with history of COPD, non-small cell lung cancer status post radiation therapy complicated by radiation fibrosis, chronic respiratory failure on continuous 3 L/min oxygen, hypertension, presented on 02/16 with worsening shortness of breath found to have pulmonary embolism. On admission, he was afebrile and hemodynamically stable with no leukocytosis. Chest CTA showed right middle lobe and left lower lobe bilateral pulmonary embolism, advanced COPD with patchy bibasilar infiltrates and pleural effusion, 2 x 1.5 cm nodule in the lingula marginally increased and indeterminate for recurrent malignancy. CT abdomen and pelvis showed heterogenous liver concerning for liver disease, progressive abdominal aortic aneurysm measuring 5 x 4.1 cm with peripheral clot, partially distended bladder with wall thickening, slight thickening of colonic wall. Serum procalcitonin level was elevated at 3.13 ng/mL. SARS-CoV-2 PCR and urine Streptococcus pneumoniae and Legionella antigens were negative. Respiratory Gram stain and culture showed more than 25 PMNs per LPF and more than 10 epithelial cells per LPF, rare gram-positive cocci in pairs, no growth to date. Blood cultures showed gram-positive cocci in chains and Staphylococcus haemolyticus and Streptococcus sp. He underwent thoracentesis on 02/18 during which 250 mL of red-tinged pleural fluid (transudative by protein) was removed. He received a dose of piperacillin-tazobactam and azithromycin on admission. Ceftriaxone was started on 02/17. Subjective: Patient was seen and examined. No chills, no abdominal pain, no diarrhea, no rash, no itching. On bipap. Opens eyes briefly.   Objective:    Vitals:    02/20/21 0800   BP: 137/73   Pulse: 86   Resp: 22   Temp: 97.8 °F (36.6 °C)   SpO2: 95%     Constitutional: Alert, not in distress Respiratory: Clear breath sounds, no crackles, no wheezes  Cardiovascular: Regular rate and rhythm, no murmurs  Gastrointestinal: Bowel sounds present, soft, non tender, n/g  Skin: Warm and dry, no active dermatoses  Musculoskeletal: No joint swelling, no joint erythema    Laboratory:  Lab Results   Component Value Date    WBC 5.8 02/20/2021    WBC 6.2 02/19/2021    WBC 7.6 02/18/2021    HGB 11.6 (L) 02/20/2021    HCT 39.6 02/20/2021    MCV 88.4 02/20/2021    PLT 64 (L) 02/20/2021     Lab Results   Component Value Date    NEUTROABS 5.22 02/20/2021    NEUTROABS 4.90 02/19/2021    NEUTROABS 5.47 02/18/2021     Lab Results   Component Value Date    CRP 10.7 (H) 02/17/2021     No results found for: CRPHS  Lab Results   Component Value Date    SEDRATE 2 02/17/2021     Lab Results   Component Value Date     (H) 02/20/2021     (H) 02/20/2021    ALKPHOS 81 02/20/2021    BILITOT 0.5 02/20/2021     Lab Results   Component Value Date     02/20/2021    K 5.0 02/20/2021    K 5.0 02/20/2021     02/20/2021    CO2 35 02/20/2021    BUN 36 02/20/2021    CREATININE 1.3 02/20/2021    CREATININE 1.4 02/19/2021    CREATININE 1.2 02/18/2021    GFRAA >60 02/20/2021    LABGLOM >60 02/20/2021    GLUCOSE 79 02/20/2021    PROT 6.2 02/20/2021    LABALBU 2.9 02/20/2021    CALCIUM 8.6 02/20/2021    BILITOT 0.5 02/20/2021    ALKPHOS 81 02/20/2021     02/20/2021     02/20/2021       Radiology: Chest CTA: Filling defect in the segmental and subsegmental branches of right middle   lobe and left lower lobe concerning for bilateral pulmonary embolism without   significant cardiac strain   Advanced COPD with patchy bibasilar infiltrates and pleural effusion   concerning for pneumonia and or edema.    2 x 1.5 cm nodule in the lingula which is marginally increased and   indeterminate for recurrent malignancy CT abdomen pelvis: Constipation with mild nonspecific thickening of the colonic wall. Mild thickening of the urinary bladder.  Cystitis is considered. Abdominal aortic aneurysm measuring 4.1 x 5 cm. Microbiology:     Blood cx  No results found for: BLOODCULT1  Culture, Blood 2   Date Value Ref Range Status   02/18/2021 24 Hours no growth  Preliminary   02/17/2021 24 Hours no growth  Preliminary   02/16/2021 (A)  Preliminary    Gram stain performed from blood culture bottle media  Gram positive cocci in clusters     02/16/2021 Identification and sensitivity to follow  Preliminary     Organism   Date Value Ref Range Status   02/16/2021 Streptococcus species (A)  Preliminary   02/16/2021 Staphylococcus haemolyticus (A)  Preliminary       Smear, Respiratory   Date Value Ref Range Status   02/17/2021   Final    Group 3: >25 PMN's/LPF and >10 Epithelial cells/LPF  Moderate Polymorphonuclear leukocytes  Few Epithelial cells  Rare Gram positive cocci in pairs         CULTURE, RESPIRATORY   Date Value Ref Range Status   02/17/2021 Oral Pharyngeal Kin reduced  Final     BODY FLUID CULTURE  Body Fluid Culture, Sterile   Date Value Ref Range Status   02/18/2021 Growth not present, incubation continues  Preliminary       URINE CULTURE  Urine Culture, Routine   Date Value Ref Range Status   02/16/2021 Growth not present  Final   05/18/2020 Growth not present  Final   02/24/2020 (A)  Final    ,000 CFU/mL  Mixed kin isolated. Further workup and sensitivity testing  is not routinely indicated and will not be performed.   Mixed kin isolated includes:  Gram negative rods  Gram positive organism     02/24/2020 >100,000 CFU/ml  Final       MRSA Culture Only   Date Value Ref Range Status   02/17/2021 Methicillin resistant Staph aureus not isolated  Final       Assessment:  Staphylococcus haemolyticus and Streptococcus sp in blood cultures, probably contaminant  Pulmonary embolism  Acute on chronic respiratory failure Pleural effusion, status post left thoracentesis on 02/18    Recommendations: Follow up blood and pleural fluid cultures. NGTD  Monitor clinically off antibiotics for now. Trend procalcitonin.-ordered for tommorrow  Continue supportive care.     Thank you for involving me in the care of [employer]. .    Electronically signed by ANGEL Butler CNP on 2/20/2021 at 9:27 AM   Pt seen and examined. Above discussed agree with advanced practice nurse. Labs, cultures, and radiographs reviewed. Face to Face encounter occurred. Changes made as necessary.      Amari Messina MD

## 2021-02-20 NOTE — CONSULTS
Palliative Care Department  [phone]  Palliative Care Initial Consult  Provider Cisco Figueroa MD      PATIENT: Dimitry Prado  : 1940  MRN: [id number]  ADMISSION DATE: 2021  2:48 PM  Referring Provider: Dr. Jaswant Lentz was consulted on hospital day 4 for assistance with Goals of care, Code Status Discussion    HPI:     Yareli Carranza is a 78 y.o. y/o male with a history of Cancer, History of radiation therapy, History of radiation therapy, Hypertension, Lung disease, Malignant neoplasm of lingula of left lung , Memory impairment, NSCLC of upper lobe who [facility] to [facility] on 2021 with AMS. ASSESSMENT/PLAN:     Pertinent Hospital Diagnoses     ? COPD, with PNA  ? Acute on chronic respiratory failure  ? Pleural effusion  ? NSCLC    Palliative Care Encounter / Counseling Regarding Goals of Care  Please see detailed goals of care discussion as below  ? At this time, Dimitry Prado, Does have capacity for medical decision-making. Capacity is time limited and situation/question specific  ? During encounter Nilo Alfaro was surrogate medical decision-maker  ? Outcome of goals of care meeting:spoke to patient and sister Nilo Alfaro about his condition. We spoke about code status, he wanted to go naturally. He does not want intubation. Changed his code status to DNRCCA/DNI. We spoke about hospice care as well, he does not want hospice at this time. ? Code status Limited DNRCCA/DNI  ? Advanced Directives: no POA or living will in [facility]  ? Surrogate/Legal NOK:Sherry Stokes, Sister, [phone]    Spiritual assessment: no spiritual distress identified  Bereavement and grief: to be determined  Referrals to: none today    Sign off  · Will Sign off, no further Code status or Luis Benes discussions at this time. Please re-consult if needed. Thank you for the opportunity to participate in the care of Dimitry Prado.      Cisco Figueroa MD Palliative Medicine     SUBJECTIVE:     Details of Conversation:  Met with patient at bedside, introduced palliative medicine. His sister was present. I woke him up from his sleep, he is aware and able to make medical decisions. He understands his disease process. We spoke about his wishes, he told me no resuscitation or intubation. I asked him about hospice care, he does not want to move forward with hospice care at this time. I spoke to Eliezer Ga outside of patients room, she had a few questions regarding giving body to science like their mother did. Support and comfort was provided. Prognosis: Guarded    OBJECTIVE:     /62   Pulse 74   Temp 97.2 °F (36.2 °C) (Infrared)   Resp 20   Ht 6' 1.5\" (1.867 m)   Wt 164 lb 14.4 oz (74.8 kg)   SpO2 95%   BMI 21.46 kg/m²     Physical Examination:  Gen: elderly, thin, has Bipap on   HEENT: normocephalic, atraumatic,   Neck: trachea midline, no JVD  Lungs: respirations easy and not labored,   Heart: regular rate and rhythm, distant heart tones,   Abdomen: soft, non-tender  Extremities: moving all extremities    Skin: warm,   Neuro: awake, alert, oriented x 3,  no gross neurologic deficit    Objective data reviewed: labs, images, records, medication use, vitals and chart    Time/Communication  Greater than 50% of time spent, total 50 minutes in counseling and coordination of care at the bedside regarding goals of care. Thank you for allowing Palliative Medicine to participate in the care of Mouna Mathew. Note: This report was completed using [employer] voiced recognition software. Every effort has been made to ensure accuracy; however, inadvertent computerized transcription errors may be present.

## 2021-02-20 NOTE — PROGRESS NOTES
ABGs drawn as ordered via right radial artery per policy, tolerated w/ minimal discomfort, pressure applied x5 minutes followed w/ pressure dressing. RT notified to run the specimen.  Electronically signed by Mukesh Farris RN on 2/20/2021 at 5:58 AM

## 2021-02-20 NOTE — PROGRESS NOTES
Date: 2/19/2021    Time: 11:49 PM    Patient Placed On BIPAP/CPAP/ Non-Invasive Ventilation? Yes    If no must comment. Facial area red/color change? No           If YES are Blister/Lesion present? No   If yes must notify nursing staff  BIPAP/CPAP skin barrier? Yes    Skin barrier type:mepilexlite       Comments:  Pt found with mask off, mask reapplied .       Tori Other

## 2021-02-20 NOTE — PROGRESS NOTES
Karen Orona M.D.,[facility]  Jessy Hurd D.O., FMIGUELODRE., Makenzie Del Rio M.D. Svetlana Hopper M.D., Charlotte Hopper M.D. Jose Shankar D.O. Daily Pulmonary Progress Note    Patient:  Stefani Danielson 78 y.o. male MRN: [id number]     Date of Service: 2/20/2021        Subjective      Patient was seen and examined. Patient is much more awake and alert. He currently is on the NIV. His hypercapnia has improved CO2 is down to 74.3 from 130. Palliative care had discussions with patient and goals of care today their input is appreciated. .  Patient currently is a limited code meds only. He is not ready to die yet but the same token they had to put him in restraints to prevent him from crawling out of the bed and pulling out all his lines and NIV.         Objective   Vitals: BP (!) 149/79   Pulse 85   Temp 96.9 °F (36.1 °C) (Temporal)   Resp 18   Ht 6' 1.5\" (1.867 m)   Wt 164 lb 14.4 oz (74.8 kg)   SpO2 98%   BMI 21.46 kg/m²     I/O:    Intake/Output Summary (Last 24 hours) at 2/20/2021 1422  Last data filed at 2/20/2021 0548  Gross per 24 hour   Intake    Output 1850 ml   Net -1850 ml       CURRENT MEDS :  Scheduled Meds:   bisacodyl  10 mg Rectal Q6H    enoxaparin  1 mg/kg Subcutaneous BID    sodium chloride flush  10 mL Intravenous 2 times per day    ipratropium-albuterol  1 ampule Inhalation Q4H While awake    guaiFENesin  400 mg Oral TID    sodium chloride flush  10 mL Intravenous 2 times per day    amLODIPine  5 mg Oral Daily    baclofen  20 mg Oral Nightly    vitamin D  2,000 Units Oral Daily    trospium  20 mg Oral BID AC    sodium chloride flush  10 mL Intravenous 2 times per day    budesonide  0.5 mg Nebulization BID    And    Arformoterol Tartrate  15 mcg Nebulization BID       Continuous Infusions:      PRN Meds: *Chronic resp failure on 2L, however patient noncompliant with home O2 and refuses to wear despite counseling multiple times.     3. Bilateral pleural effusions L>R with atelectasis/infiltrate. - s/p left thoracentesis 250 cc removed 2/18, transudative fluid  Possible pneumonia. Bibasilar infiltrate may be associated atelectasis or pneumonic process. 4. COPD with bullous emphysema  *Hx of tobacco use 55 py's and multiple occulational exposures. Moderate obstruction on PFT 5/2019, FEV1 55%     5. 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 Had good response based on imaging. Repeat imaging 3/2020 showing new lingular nodule. Underwent SBRT again.      6. History prostate CA 2009 treated with XRT     7. Hx of tobacco use. 55 py history. 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     8. Non-compliance with wearing home O2.    9. Bactermia, possible contaminant    10. Staph hemolyticus and strep and blood cultures probably contamination      Plan:     · Follow-up ABG shows significant improvement in hypercapnic respiratory failure, will recommend to continue NIV 4 hours off for on and nightly  · Patient CODE STATUS now has been changed to limited meds only. · Monitor off antibiotics per infectious disease recommendations    Patient poor prognosis would still recommend hospice. Thank you for allowing me to participate in the care of Colleen Ang. Please feel free to call with questions.      Electronically signed by Raya Rosenberg MD on 2/20/2021 at 2:22 PM

## 2021-02-20 NOTE — PLAN OF CARE
Problem: Falls - Risk of:  Goal: Will remain free from falls  Description: Will remain free from falls  2/20/2021 1316 by Lanre Spencer RN  Outcome: Met This Shift     Problem: Falls - Risk of:  Goal: Absence of physical injury  Description: Absence of physical injury  2/20/2021 1316 by Lanre Spencer RN  Outcome: Met This Shift     Problem: Skin Integrity:  Goal: Will show no infection signs and symptoms  Description: Will show no infection signs and symptoms  2/20/2021 1316 by Lanre Spencer RN  Outcome: Met This Shift     Problem: Skin Integrity:  Goal: Absence of new skin breakdown  Description: Absence of new skin breakdown  2/20/2021 1316 by Lanre Spencer RN  Outcome: Met This Shift     Problem: Non-Violent Restraints  Goal: No harm/injury to patient while restraints in use  2/20/2021 1316 by Lanre Spencer RN  Outcome: Met This Shift     Problem: Non-Violent Restraints  Goal: Patient's dignity will be maintained  2/20/2021 1316 by Lanre Spencer RN  Outcome: Met This Shift

## 2021-02-21 LAB
ALBUMIN SERPL-MCNC: 2.9 G/DL (ref 3.5–5.2)
ALP BLD-CCNC: 78 U/L (ref 40–129)
ALT SERPL-CCNC: 389 U/L (ref 0–40)
ANION GAP SERPL CALCULATED.3IONS-SCNC: 2 MMOL/L (ref 7–16)
ANISOCYTOSIS: ABNORMAL
AST SERPL-CCNC: 238 U/L (ref 0–39)
BASOPHILIC STIPPLING: ABNORMAL
BASOPHILS ABSOLUTE: 0.03 E9/L (ref 0–0.2)
BASOPHILS RELATIVE PERCENT: 0.6 % (ref 0–2)
BILIRUB SERPL-MCNC: 0.7 MG/DL (ref 0–1.2)
BLOOD CULTURE, ROUTINE: ABNORMAL
BUN BLDV-MCNC: 28 MG/DL (ref 8–23)
CALCIUM SERPL-MCNC: 8.7 MG/DL (ref 8.6–10.2)
CHLORIDE BLD-SCNC: 104 MMOL/L (ref 98–107)
CO2: 41 MMOL/L (ref 22–29)
CREAT SERPL-MCNC: 1 MG/DL (ref 0.7–1.2)
EOSINOPHILS ABSOLUTE: 0.02 E9/L (ref 0.05–0.5)
EOSINOPHILS RELATIVE PERCENT: 0.4 % (ref 0–6)
GFR AFRICAN AMERICAN: >60
GFR NON-AFRICAN AMERICAN: >60 ML/MIN/1.73
GLUCOSE BLD-MCNC: 86 MG/DL (ref 74–99)
HCT VFR BLD CALC: 38.9 % (ref 37–54)
HEMOGLOBIN: 11.2 G/DL (ref 12.5–16.5)
HYPOCHROMIA: ABNORMAL
IMMATURE GRANULOCYTES #: 0.17 E9/L
IMMATURE GRANULOCYTES %: 3.5 % (ref 0–5)
LYMPHOCYTES ABSOLUTE: 0.36 E9/L (ref 1.5–4)
LYMPHOCYTES RELATIVE PERCENT: 7.5 % (ref 20–42)
MCH RBC QN AUTO: 25.7 PG (ref 26–35)
MCHC RBC AUTO-ENTMCNC: 28.8 % (ref 32–34.5)
MCV RBC AUTO: 89.2 FL (ref 80–99.9)
MONOCYTES ABSOLUTE: 0.92 E9/L (ref 0.1–0.95)
MONOCYTES RELATIVE PERCENT: 19 % (ref 2–12)
NEUTROPHILS ABSOLUTE: 3.33 E9/L (ref 1.8–7.3)
NEUTROPHILS RELATIVE PERCENT: 69 % (ref 43–80)
ORGANISM: ABNORMAL
OVALOCYTES: ABNORMAL
PDW BLD-RTO: 21.9 FL (ref 11.5–15)
PLATELET # BLD: 71 E9/L (ref 130–450)
PLATELET CONFIRMATION: NORMAL
PMV BLD AUTO: ABNORMAL FL (ref 7–12)
POIKILOCYTES: ABNORMAL
POLYCHROMASIA: ABNORMAL
POTASSIUM REFLEX MAGNESIUM: 4.8 MMOL/L (ref 3.5–5)
POTASSIUM SERPL-SCNC: 4.8 MMOL/L (ref 3.5–5)
PROCALCITONIN: 0.6 NG/ML (ref 0–0.08)
RBC # BLD: 4.36 E12/L (ref 3.8–5.8)
SCHISTOCYTES: ABNORMAL
SODIUM BLD-SCNC: 147 MMOL/L (ref 132–146)
TARGET CELLS: ABNORMAL
TOTAL PROTEIN: 6.3 G/DL (ref 6.4–8.3)
WBC # BLD: 4.8 E9/L (ref 4.5–11.5)

## 2021-02-21 PROCEDURE — 2580000003 HC RX 258: Performed by: INTERNAL MEDICINE

## 2021-02-21 PROCEDURE — 6370000000 HC RX 637 (ALT 250 FOR IP): Performed by: FAMILY MEDICINE

## 2021-02-21 PROCEDURE — 80053 COMPREHEN METABOLIC PANEL: CPT

## 2021-02-21 PROCEDURE — 2580000003 HC RX 258: Performed by: FAMILY MEDICINE

## 2021-02-21 PROCEDURE — 94640 AIRWAY INHALATION TREATMENT: CPT

## 2021-02-21 PROCEDURE — 94660 CPAP INITIATION&MGMT: CPT

## 2021-02-21 PROCEDURE — 6370000000 HC RX 637 (ALT 250 FOR IP): Performed by: INTERNAL MEDICINE

## 2021-02-21 PROCEDURE — 84145 PROCALCITONIN (PCT): CPT

## 2021-02-21 PROCEDURE — 6360000002 HC RX W HCPCS: Performed by: INTERNAL MEDICINE

## 2021-02-21 PROCEDURE — 2700000000 HC OXYGEN THERAPY PER DAY

## 2021-02-21 PROCEDURE — 2060000000 HC ICU INTERMEDIATE R&B

## 2021-02-21 PROCEDURE — 6370000000 HC RX 637 (ALT 250 FOR IP): Performed by: STUDENT IN AN ORGANIZED HEALTH CARE EDUCATION/TRAINING PROGRAM

## 2021-02-21 PROCEDURE — 6360000002 HC RX W HCPCS: Performed by: FAMILY MEDICINE

## 2021-02-21 PROCEDURE — 85025 COMPLETE CBC W/AUTO DIFF WBC: CPT

## 2021-02-21 PROCEDURE — 36415 COLL VENOUS BLD VENIPUNCTURE: CPT

## 2021-02-21 RX ADMIN — Medication 10 ML: at 21:04

## 2021-02-21 RX ADMIN — BISACODYL 10 MG: 10 SUPPOSITORY RECTAL at 02:51

## 2021-02-21 RX ADMIN — ENOXAPARIN SODIUM 80 MG: 80 INJECTION SUBCUTANEOUS at 08:36

## 2021-02-21 RX ADMIN — AMLODIPINE BESYLATE 5 MG: 5 TABLET ORAL at 08:35

## 2021-02-21 RX ADMIN — Medication 10 ML: at 08:36

## 2021-02-21 RX ADMIN — TROSPIUM CHLORIDE 20 MG: 20 TABLET, FILM COATED ORAL at 15:11

## 2021-02-21 RX ADMIN — IPRATROPIUM BROMIDE AND ALBUTEROL SULFATE 1 AMPULE: 2.5; .5 SOLUTION RESPIRATORY (INHALATION) at 12:50

## 2021-02-21 RX ADMIN — IPRATROPIUM BROMIDE AND ALBUTEROL SULFATE 1 AMPULE: 2.5; .5 SOLUTION RESPIRATORY (INHALATION) at 09:10

## 2021-02-21 RX ADMIN — IPRATROPIUM BROMIDE AND ALBUTEROL SULFATE 1 AMPULE: 2.5; .5 SOLUTION RESPIRATORY (INHALATION) at 20:35

## 2021-02-21 RX ADMIN — BACLOFEN 20 MG: 10 TABLET ORAL at 21:03

## 2021-02-21 RX ADMIN — BUDESONIDE 500 MCG: 0.5 SUSPENSION RESPIRATORY (INHALATION) at 20:35

## 2021-02-21 RX ADMIN — GUAIFENESIN 400 MG: 400 TABLET ORAL at 13:17

## 2021-02-21 RX ADMIN — ENOXAPARIN SODIUM 80 MG: 80 INJECTION SUBCUTANEOUS at 21:03

## 2021-02-21 RX ADMIN — GUAIFENESIN 400 MG: 400 TABLET ORAL at 08:36

## 2021-02-21 RX ADMIN — Medication 10 ML: at 08:37

## 2021-02-21 RX ADMIN — TROSPIUM CHLORIDE 20 MG: 20 TABLET, FILM COATED ORAL at 06:25

## 2021-02-21 RX ADMIN — GUAIFENESIN 400 MG: 400 TABLET ORAL at 21:04

## 2021-02-21 RX ADMIN — ARFORMOTEROL TARTRATE 15 MCG: 15 SOLUTION RESPIRATORY (INHALATION) at 20:35

## 2021-02-21 RX ADMIN — IPRATROPIUM BROMIDE AND ALBUTEROL SULFATE 1 AMPULE: 2.5; .5 SOLUTION RESPIRATORY (INHALATION) at 17:26

## 2021-02-21 RX ADMIN — BUDESONIDE 500 MCG: 0.5 SUSPENSION RESPIRATORY (INHALATION) at 09:10

## 2021-02-21 RX ADMIN — ARFORMOTEROL TARTRATE 15 MCG: 15 SOLUTION RESPIRATORY (INHALATION) at 09:10

## 2021-02-21 RX ADMIN — Medication 2000 UNITS: at 10:33

## 2021-02-21 RX ADMIN — BISACODYL 10 MG: 10 SUPPOSITORY RECTAL at 21:04

## 2021-02-21 ASSESSMENT — PAIN SCALES - GENERAL: PAINLEVEL_OUTOF10: 0

## 2021-02-21 NOTE — PROGRESS NOTES
AILEEN PROGRESS NOTE      Chief complaint: Follow-up of Gram-positive cocci in blood cultures     The patient is a 78 y.o. male with history of COPD, non-small cell lung cancer status post radiation therapy complicated by radiation fibrosis, chronic respiratory failure on continuous 3 L/min oxygen, hypertension, presented on 02/16 with worsening shortness of breath found to have pulmonary embolism. On admission, he was afebrile and hemodynamically stable with no leukocytosis. Chest CTA showed right middle lobe and left lower lobe bilateral pulmonary embolism, advanced COPD with patchy bibasilar infiltrates and pleural effusion, 2 x 1.5 cm nodule in the lingula marginally increased and indeterminate for recurrent malignancy. CT abdomen and pelvis showed heterogenous liver concerning for liver disease, progressive abdominal aortic aneurysm measuring 5 x 4.1 cm with peripheral clot, partially distended bladder with wall thickening, slight thickening of colonic wall. Serum procalcitonin level was elevated at 3.13 ng/mL. SARS-CoV-2 PCR and urine Streptococcus pneumoniae and Legionella antigens were negative. Respiratory Gram stain and culture showed more than 25 PMNs per LPF and more than 10 epithelial cells per LPF, rare gram-positive cocci in pairs, no growth to date. Blood cultures showed gram-positive cocci in chains and Staphylococcus haemolyticus and Streptococcus sp. He underwent thoracentesis on 02/18 during which 250 mL of red-tinged pleural fluid (transudative by protein) was removed. He received a dose of piperacillin-tazobactam and azithromycin on admission. Ceftriaxone was started on 02/17. Subjective: Patient was seen and examined. No chills, no abdominal pain, no diarrhea, no rash, no itching.  On nasal canula  Objective:    Vitals:    02/21/21 0756   BP:    Pulse:    Resp:    Temp:    SpO2: (!) 86%     Constitutional: Alert, not in distress Respiratory: Clear breath sounds, no crackles, no wheezes  Cardiovascular: Regular rate and rhythm, no murmurs  Gastrointestinal: Bowel sounds present, soft, non tender, n/g  Skin: Warm and dry, no active dermatoses  Musculoskeletal: No joint swelling, no joint erythema    Laboratory:  Lab Results   Component Value Date    WBC 4.8 02/21/2021    WBC 5.8 02/20/2021    WBC 6.2 02/19/2021    HGB 11.2 (L) 02/21/2021    HCT 38.9 02/21/2021    MCV 89.2 02/21/2021    PLT 71 (L) 02/21/2021     Lab Results   Component Value Date    NEUTROABS 3.33 02/21/2021    NEUTROABS 5.22 02/20/2021    NEUTROABS 4.90 02/19/2021     Lab Results   Component Value Date    CRP 10.7 (H) 02/17/2021     No results found for: CRPHS  Lab Results   Component Value Date    SEDRATE 2 02/17/2021     Lab Results   Component Value Date     (H) 02/21/2021     (H) 02/21/2021    ALKPHOS 78 02/21/2021    BILITOT 0.7 02/21/2021     Lab Results   Component Value Date     02/21/2021    K 4.8 02/21/2021    K 4.8 02/21/2021     02/21/2021    CO2 41 02/21/2021    BUN 28 02/21/2021    CREATININE 1.0 02/21/2021    CREATININE 1.3 02/20/2021    CREATININE 1.4 02/19/2021    GFRAA >60 02/21/2021    LABGLOM >60 02/21/2021    GLUCOSE 86 02/21/2021    PROT 6.3 02/21/2021    LABALBU 2.9 02/21/2021    CALCIUM 8.7 02/21/2021    BILITOT 0.7 02/21/2021    ALKPHOS 78 02/21/2021     02/21/2021     02/21/2021       Radiology: Chest CTA: Filling defect in the segmental and subsegmental branches of right middle   lobe and left lower lobe concerning for bilateral pulmonary embolism without   significant cardiac strain   Advanced COPD with patchy bibasilar infiltrates and pleural effusion   concerning for pneumonia and or edema.    2 x 1.5 cm nodule in the lingula which is marginally increased and   indeterminate for recurrent malignancy CT abdomen pelvis: Constipation with mild nonspecific thickening of the colonic wall. Mild thickening of the urinary bladder.  Cystitis is considered. Abdominal aortic aneurysm measuring 4.1 x 5 cm. Microbiology:     Blood cx  No results found for: BLOODCULT1  Culture, Blood 2   Date Value Ref Range Status   02/18/2021 24 Hours no growth  Preliminary   02/17/2021 24 Hours no growth  Preliminary   02/16/2021 (A)  Preliminary    Gram stain performed from blood culture bottle media  Gram positive cocci in clusters     02/16/2021 Identification and sensitivity to follow  Preliminary     Organism   Date Value Ref Range Status   02/16/2021 Streptococcus species (A)  Preliminary   02/16/2021 Staphylococcus haemolyticus (A)  Preliminary       Smear, Respiratory   Date Value Ref Range Status   02/17/2021   Final    Group 3: >25 PMN's/LPF and >10 Epithelial cells/LPF  Moderate Polymorphonuclear leukocytes  Few Epithelial cells  Rare Gram positive cocci in pairs         CULTURE, RESPIRATORY   Date Value Ref Range Status   02/17/2021 Oral Pharyngeal Kin reduced  Final     BODY FLUID CULTURE  Body Fluid Culture, Sterile   Date Value Ref Range Status   02/18/2021 Growth not present  Final       URINE CULTURE  Urine Culture, Routine   Date Value Ref Range Status   02/16/2021 Growth not present  Final   05/18/2020 Growth not present  Final   02/24/2020 (A)  Final    ,000 CFU/mL  Mixed kin isolated. Further workup and sensitivity testing  is not routinely indicated and will not be performed.   Mixed kin isolated includes:  Gram negative rods  Gram positive organism     02/24/2020 >100,000 CFU/ml  Final       MRSA Culture Only   Date Value Ref Range Status   02/17/2021 Methicillin resistant Staph aureus not isolated  Final       Assessment:  Staphylococcus haemolyticus and Streptococcus sp in blood cultures, probably contaminant  Pulmonary embolism  Acute on chronic respiratory failure Pleural effusion, status post left thoracentesis on 02/18    Recommendations: Follow up blood and pleural fluid cultures. NGTD  Monitor clinically off antibiotics for now. Trend procalcitonin. -decreasing  Continue supportive care.     Thank you for involving me in the care of Celeste Roa. .    Electronically signed by ANGEL Canela CNP on 2/21/2021 at 8:02 AM   Pt seen and examined. Above discussed agree with advanced practice nurse. Labs, cultures, and radiographs reviewed. Face to Face encounter occurred. Changes made as necessary.      Treva Young MD   .

## 2021-02-21 NOTE — PROGRESS NOTES
 enoxaparin  1 mg/kg Subcutaneous BID    sodium chloride flush  10 mL Intravenous 2 times per day    ipratropium-albuterol  1 ampule Inhalation Q4H While awake    guaiFENesin  400 mg Oral TID    sodium chloride flush  10 mL Intravenous 2 times per day    amLODIPine  5 mg Oral Daily    baclofen  20 mg Oral Nightly    vitamin D  2,000 Units Oral Daily    trospium  20 mg Oral BID AC    sodium chloride flush  10 mL Intravenous 2 times per day    budesonide  0.5 mg Nebulization BID    And    Arformoterol Tartrate  15 mcg Nebulization BID     PRN Meds: sodium chloride flush, perflutren lipid microspheres, sodium chloride flush, albuterol, sodium chloride flush, promethazine **OR** ondansetron, polyethylene glycol      Intake/Output Summary (Last 24 hours) at 2/21/2021 0926  Last data filed at 2/21/2021 0841  Gross per 24 hour   Intake 1420 ml   Output 1445 ml   Net -25 ml       Exam:    BP (!) 144/81   Pulse 76   Temp 96.8 °F (36 °C) (Axillary)   Resp 19   Ht 6' 1.5\" (1.867 m)   Wt 163 lb 3.2 oz (74 kg)   SpO2 100%   BMI 21.24 kg/m²     General appearance:   Chronically ill appearance with lethargy, nasal cannula in place  Neck: No JVD. Trachea midline. Respiratory: diminished bilaterally, mildly increased work of breathing  Cardiovascular: Regular rate and rhythm with normal S1/S2 without murmurs, rubs or gallops. Abdomen: Soft, non-tender, non-distended with normal bowel sounds. Musculoskeletal: No clubbing, cyanosis or edema bilaterally. Full range of motion without deformity. Skin: Skin color, texture, turgor normal.  No rashes or lesions.   Neurologic: Patient is awake, talkative, pleasant and cooperative but confused      Labs:   Recent Labs     02/19/21  0517 02/20/21  0712 02/21/21  0551   WBC 6.2 5.8 4.8   HGB 12.8 11.6* 11.2*   HCT 45.4 39.6 38.9   * 64* 71*     Recent Labs     02/19/21  1428 02/20/21  0711 02/21/21  0551    146 147*   K 4.8 5.0  5.0 4.8  4.8  105 104   CO2 34* 35* 41*   BUN 40* 36* 28*   CREATININE 1.4* 1.3* 1.0   CALCIUM 8.6 8.6 8.7     Recent Labs     02/19/21  1428 02/20/21  0711 02/21/21  0551   * 327* 238*   * 468* 389*   BILITOT 0.7 0.5 0.7   ALKPHOS 101 81 78     No results for input(s): INR in the last 72 hours. No results for input(s): Milli Journey in the last 72 hours.     Assessment/Plan:    Active Hospital Problems    Diagnosis Date Noted    Pulmonary embolism ([facility] [address] [I26.99] 02/16/2021        ASSESSMENT:    Bilateral pulmonary emboli in setting of NSCLC  Patient started on Lovenox 1 mg/kg daily   switched to Eliquis for PE treatment when awake and able to swallow   Consult pulmonology  Off Bipap    Community-acquired pneumonia  With underlying COPD  Patient started on IV antibiotics with ceftriaxone and azithromycin  Per ID IV AB was DC   Chronically oxygen dependent  Chronic respiratory failure on 3 Lat home      Bacteremia/sepsis on admission with lactic acidosis  Blood culture came back positive-Gram-positive cocci (methicillin-resistant Staphylococcus species not aureus by PCR) in blood cultures, probably contaminant  Consult ID for IV antibiotic management- DC AB per ID - monitor pt without AB per ID recommendation     Urinary tract infection  Continue IV fluid resuscitation  Per ID  IV antibiotics was dc   Awaiting for urine culture results    Chronic renal insufficiency stage III  Continue IV fluid resuscitation  Avoid nephrotoxins     Transaminates  AST/ ALT increase 2 times since admission  Will order hepatic panel hepatitis BC  Right upper quadrant ultrasound      DVT Prophylaxis: On Lovenox  Diet: DIET CLEAR LIQUID;  Code Status: Limited    PT/OT Eval Status: re-eval requested    Dispo -SNF    Stephanie Alejandre DO

## 2021-02-21 NOTE — PROGRESS NOTES
Patient is currently following commands and appears to understand the importance of not pulling at lines tubes and drains. Restraints removed at this time.

## 2021-02-21 NOTE — PROGRESS NOTES
Date: 2/21/2021    Time: 1:34 AM    Patient Placed On BIPAP/CPAP/ Non-Invasive Ventilation? Patient continues on bipap    If no must comment. Facial area red/color change? No           If YES are Blister/Lesion present? No   If yes must notify nursing staff  BIPAP/CPAP skin barrier?   Yes    Skin barrier type:mepilexlite       Comments:        Nikhil Paz, RRT

## 2021-02-21 NOTE — PROGRESS NOTES
When pt was talking on 10 liters high flow his Oxygen sat dropped into the 70's placed him on 15 liters high flow will continue to monitor

## 2021-02-21 NOTE — PLAN OF CARE
Problem: Falls - Risk of:  Goal: Will remain free from falls  Description: Will remain free from falls  2/21/2021 0651 by Joey Olea RN  Outcome: Met This Shift  2/21/2021 0116 by Joey Olea RN  Outcome: Met This Shift  Goal: Absence of physical injury  Description: Absence of physical injury  2/21/2021 0651 by Joey Olea RN  Outcome: Met This Shift  2/21/2021 0116 by Joey Olea RN  Outcome: Met This Shift

## 2021-02-21 NOTE — PROGRESS NOTES
Messaged surgical resident for clarification on NG tube status and clear liquid diet orders. Will continue to monitor.

## 2021-02-22 LAB
ALBUMIN SERPL-MCNC: 2.7 G/DL (ref 3.5–5.2)
ALP BLD-CCNC: 72 U/L (ref 40–129)
ALT SERPL-CCNC: 272 U/L (ref 0–40)
ANION GAP SERPL CALCULATED.3IONS-SCNC: 2 MMOL/L (ref 7–16)
ANISOCYTOSIS: ABNORMAL
AST SERPL-CCNC: 157 U/L (ref 0–39)
BASOPHILIC STIPPLING: ABNORMAL
BASOPHILS ABSOLUTE: 0.03 E9/L (ref 0–0.2)
BASOPHILS RELATIVE PERCENT: 0.7 % (ref 0–2)
BILIRUB SERPL-MCNC: 0.6 MG/DL (ref 0–1.2)
BUN BLDV-MCNC: 22 MG/DL (ref 8–23)
CALCIUM SERPL-MCNC: 8.3 MG/DL (ref 8.6–10.2)
CHLORIDE BLD-SCNC: 101 MMOL/L (ref 98–107)
CO2: 40 MMOL/L (ref 22–29)
CREAT SERPL-MCNC: 0.9 MG/DL (ref 0.7–1.2)
EOSINOPHILS ABSOLUTE: 0.05 E9/L (ref 0.05–0.5)
EOSINOPHILS RELATIVE PERCENT: 1.1 % (ref 0–6)
GFR AFRICAN AMERICAN: >60
GFR NON-AFRICAN AMERICAN: >60 ML/MIN/1.73
GLUCOSE BLD-MCNC: 87 MG/DL (ref 74–99)
HCT VFR BLD CALC: 37.2 % (ref 37–54)
HEMOGLOBIN: 10.9 G/DL (ref 12.5–16.5)
HYPOCHROMIA: ABNORMAL
IMMATURE GRANULOCYTES #: 0.12 E9/L
IMMATURE GRANULOCYTES %: 2.7 % (ref 0–5)
LYMPHOCYTES ABSOLUTE: 0.37 E9/L (ref 1.5–4)
LYMPHOCYTES RELATIVE PERCENT: 8.4 % (ref 20–42)
MCH RBC QN AUTO: 26 PG (ref 26–35)
MCHC RBC AUTO-ENTMCNC: 29.3 % (ref 32–34.5)
MCV RBC AUTO: 88.6 FL (ref 80–99.9)
MONOCYTES ABSOLUTE: 0.75 E9/L (ref 0.1–0.95)
MONOCYTES RELATIVE PERCENT: 16.9 % (ref 2–12)
NEUTROPHILS ABSOLUTE: 3.11 E9/L (ref 1.8–7.3)
NEUTROPHILS RELATIVE PERCENT: 70.2 % (ref 43–80)
ORGANISM: ABNORMAL
OVALOCYTES: ABNORMAL
PDW BLD-RTO: 21.4 FL (ref 11.5–15)
PLATELET # BLD: 70 E9/L (ref 130–450)
PLATELET CONFIRMATION: NORMAL
PMV BLD AUTO: ABNORMAL FL (ref 7–12)
POIKILOCYTES: ABNORMAL
POLYCHROMASIA: ABNORMAL
POTASSIUM REFLEX MAGNESIUM: 4.3 MMOL/L (ref 3.5–5)
POTASSIUM SERPL-SCNC: 4.3 MMOL/L (ref 3.5–5)
RBC # BLD: 4.2 E12/L (ref 3.8–5.8)
SODIUM BLD-SCNC: 143 MMOL/L (ref 132–146)
TARGET CELLS: ABNORMAL
TOTAL PROTEIN: 6 G/DL (ref 6.4–8.3)
WBC # BLD: 4.4 E9/L (ref 4.5–11.5)

## 2021-02-22 PROCEDURE — 36415 COLL VENOUS BLD VENIPUNCTURE: CPT

## 2021-02-22 PROCEDURE — 97530 THERAPEUTIC ACTIVITIES: CPT | Performed by: PHYSICAL THERAPIST

## 2021-02-22 PROCEDURE — 6370000000 HC RX 637 (ALT 250 FOR IP): Performed by: STUDENT IN AN ORGANIZED HEALTH CARE EDUCATION/TRAINING PROGRAM

## 2021-02-22 PROCEDURE — 6370000000 HC RX 637 (ALT 250 FOR IP): Performed by: INTERNAL MEDICINE

## 2021-02-22 PROCEDURE — 97535 SELF CARE MNGMENT TRAINING: CPT

## 2021-02-22 PROCEDURE — 6360000002 HC RX W HCPCS: Performed by: FAMILY MEDICINE

## 2021-02-22 PROCEDURE — 94640 AIRWAY INHALATION TREATMENT: CPT

## 2021-02-22 PROCEDURE — 2700000000 HC OXYGEN THERAPY PER DAY

## 2021-02-22 PROCEDURE — 6370000000 HC RX 637 (ALT 250 FOR IP): Performed by: FAMILY MEDICINE

## 2021-02-22 PROCEDURE — 2580000003 HC RX 258: Performed by: INTERNAL MEDICINE

## 2021-02-22 PROCEDURE — 6360000002 HC RX W HCPCS: Performed by: INTERNAL MEDICINE

## 2021-02-22 PROCEDURE — 85025 COMPLETE CBC W/AUTO DIFF WBC: CPT

## 2021-02-22 PROCEDURE — 94660 CPAP INITIATION&MGMT: CPT

## 2021-02-22 PROCEDURE — 2060000000 HC ICU INTERMEDIATE R&B

## 2021-02-22 PROCEDURE — 80053 COMPREHEN METABOLIC PANEL: CPT

## 2021-02-22 RX ORDER — DOCUSATE SODIUM 100 MG/1
100 CAPSULE, LIQUID FILLED ORAL 2 TIMES DAILY
Status: DISCONTINUED | OUTPATIENT
Start: 2021-02-22 | End: 2021-02-26 | Stop reason: HOSPADM

## 2021-02-22 RX ORDER — POLYETHYLENE GLYCOL 3350 17 G/17G
17 POWDER, FOR SOLUTION ORAL DAILY
Status: DISCONTINUED | OUTPATIENT
Start: 2021-02-22 | End: 2021-02-26 | Stop reason: HOSPADM

## 2021-02-22 RX ADMIN — ARFORMOTEROL TARTRATE 15 MCG: 15 SOLUTION RESPIRATORY (INHALATION) at 10:19

## 2021-02-22 RX ADMIN — Medication 10 ML: at 21:05

## 2021-02-22 RX ADMIN — ARFORMOTEROL TARTRATE 15 MCG: 15 SOLUTION RESPIRATORY (INHALATION) at 20:18

## 2021-02-22 RX ADMIN — IPRATROPIUM BROMIDE AND ALBUTEROL SULFATE 1 AMPULE: 2.5; .5 SOLUTION RESPIRATORY (INHALATION) at 16:02

## 2021-02-22 RX ADMIN — BUDESONIDE 500 MCG: 0.5 SUSPENSION RESPIRATORY (INHALATION) at 10:19

## 2021-02-22 RX ADMIN — BISACODYL 10 MG: 10 SUPPOSITORY RECTAL at 08:42

## 2021-02-22 RX ADMIN — VANCOMYCIN HYDROCHLORIDE 1000 MG: 1 INJECTION, POWDER, LYOPHILIZED, FOR SOLUTION INTRAVENOUS at 14:20

## 2021-02-22 RX ADMIN — DOCUSATE SODIUM 100 MG: 100 CAPSULE, LIQUID FILLED ORAL at 08:41

## 2021-02-22 RX ADMIN — DOCUSATE SODIUM 100 MG: 100 CAPSULE, LIQUID FILLED ORAL at 21:05

## 2021-02-22 RX ADMIN — GUAIFENESIN 400 MG: 400 TABLET ORAL at 14:14

## 2021-02-22 RX ADMIN — TROSPIUM CHLORIDE 20 MG: 20 TABLET, FILM COATED ORAL at 16:03

## 2021-02-22 RX ADMIN — BUDESONIDE 500 MCG: 0.5 SUSPENSION RESPIRATORY (INHALATION) at 20:18

## 2021-02-22 RX ADMIN — GUAIFENESIN 400 MG: 400 TABLET ORAL at 21:06

## 2021-02-22 RX ADMIN — Medication 10 ML: at 08:45

## 2021-02-22 RX ADMIN — POLYETHYLENE GLYCOL 3350 17 G: 17 POWDER, FOR SOLUTION ORAL at 08:41

## 2021-02-22 RX ADMIN — TROSPIUM CHLORIDE 20 MG: 20 TABLET, FILM COATED ORAL at 06:04

## 2021-02-22 RX ADMIN — AMLODIPINE BESYLATE 5 MG: 5 TABLET ORAL at 08:35

## 2021-02-22 RX ADMIN — Medication 10 ML: at 08:50

## 2021-02-22 RX ADMIN — IPRATROPIUM BROMIDE AND ALBUTEROL SULFATE 1 AMPULE: 2.5; .5 SOLUTION RESPIRATORY (INHALATION) at 14:02

## 2021-02-22 RX ADMIN — BISACODYL 10 MG: 10 SUPPOSITORY RECTAL at 14:25

## 2021-02-22 RX ADMIN — BISACODYL 10 MG: 10 SUPPOSITORY RECTAL at 02:50

## 2021-02-22 RX ADMIN — APIXABAN 10 MG: 5 TABLET, FILM COATED ORAL at 21:05

## 2021-02-22 RX ADMIN — BISACODYL 10 MG: 10 SUPPOSITORY RECTAL at 21:05

## 2021-02-22 RX ADMIN — IPRATROPIUM BROMIDE AND ALBUTEROL SULFATE 1 AMPULE: 2.5; .5 SOLUTION RESPIRATORY (INHALATION) at 10:20

## 2021-02-22 RX ADMIN — IPRATROPIUM BROMIDE AND ALBUTEROL SULFATE 1 AMPULE: 2.5; .5 SOLUTION RESPIRATORY (INHALATION) at 20:18

## 2021-02-22 RX ADMIN — ENOXAPARIN SODIUM 80 MG: 80 INJECTION SUBCUTANEOUS at 08:36

## 2021-02-22 RX ADMIN — GUAIFENESIN 400 MG: 400 TABLET ORAL at 08:36

## 2021-02-22 RX ADMIN — Medication 2000 UNITS: at 08:34

## 2021-02-22 RX ADMIN — BACLOFEN 20 MG: 10 TABLET ORAL at 21:06

## 2021-02-22 ASSESSMENT — PAIN SCALES - GENERAL
PAINLEVEL_OUTOF10: 0
PAINLEVEL_OUTOF10: 0

## 2021-02-22 NOTE — PROGRESS NOTES
Pt O2 sat dropped below 90, respiratory gave breathing tx, pt sitting up straight O2 sat is at 95% at this time

## 2021-02-22 NOTE — PROGRESS NOTES
OCCUPATIONAL THERAPY  Treatment Session    Date:2021  Patient Name: Marcie Reyes  MRN: [id number]  : 1940  Room: [address] [place], [place] [address]    Evaluating OT: JASON Post, OTR/L [id number]    AM-PAC Daily Activity Raw Score:   Recommended Adaptive Equipment: shower chair     Diagnosis: PE   Referring Practitioner: Jordan Ramirez MD  Surgery: n/a   Pertinent Medical History: AAA, Lung CA, HTN, memory impairment   Precautions:  Falls,  15L HF O2, Contreras Catheter     Home Living: Pt lives alone in a 2 story home with step(s) to enter; bed/bath on main level   Bathroom setup: tub/shower unit   Equipment owned: none    Prior Level of Function: IND with ADLs/IADLs; using no AD for functional mobility   Driving: yes    Pain Level: 0/10 - denies pain    Cognition: A&O: 3/4; Follows 2 step directions, with Min Vcs and increased time   Memory:  fair    Sequencing:  fair    Problem solving:  fair    Judgement/safety:  fair      Vitals: 15L HF NC  O2 sats in semi-supine prior to ax:  91%  O2 sats w/ bed-level ax:  86%  O2 sats in high sidhu position:  88%  O2 sats in semi-supine at end of session:  90%    RN present at end of session - aware of vitals      Functional Assessment:   Initial Eval Status  Date: 21 Treatment Status  Date:  21 Short Term Goals  Treatment frequency: 2-3 x/week   Feeding SUP  NT IND   Grooming Min A (standing at sink for facial washing)  SUP    Able to complete tasks after set up in high-sidhu position  Pt unsafe to attempt task seated EOB d/t hypoxia w/ 15L   SUP   UB Dressing SUP  Min A    Min A to don/doff hospital gown after set up in high sidhu position - unable to tolerate task seated EOB d/t hypoxia   IND   LB Dressing Min A (pt able to use crossing of leg technique to doff/lizzeth B socks) Dep    Max A to don socks in supine  Unsafe to attempt to stand w/ Max A of 1;  Bed-level ax only    SUP    Bathing Min A (simulated) NT SUP Toileting Min A (while standing to void, with increased time) NT    Contreras Catheter   SUP   Bed Mobility  Log roll: NT  Supine to sit: NT   Sit to supine: NT  Log roll: NT  Repositioning: Mod A  Supine to sit: NT   Sit to supine: NT     Unsafe to attempt Transfer to EOB this session d/t hypoxia, Able to use Ankur UEs/LEs w/ Mod VCs to facilitate repositioning toward HOB in supine in order to facilitate improved breathing w/ HOB elevated in high sidhu position    Log roll IND  Supine to sit: IND   Sit to supine: IND   Functional Transfers Sit to stand:SBA   Stand to sit:SBA  Commode: Min A (due to 1 LOB) NT   IND   Functional Mobility Min A (no AD, to/from bathroom, pt used furniture to hold onto, 1 LOB to commode) NT   SUP   Balance Sitting: SUP  Standing: Min A NT    Activity Tolerance fair Poor(+)    Able to tolerate simple bed-level ax    Visual/  Perceptual Glasses: yes                Hand dominance: L  UE ROM: RUE:  WFL  LUE:  WFL  Strength: RUE: grossly 5/5 LUE: grossly 5/5   Strength: B WFL  Fine Motor Coordination:  WFL     Hearing: WFL  Sensation:  No c/o numbness/tingling   Tone:  WFL  Edema: BLEs                             Comments:  Cleared by RN to see pt. Upon arrival, patient found in semi-supine - agreeable to OT session. At end of session, patient was properly positioned in semi-supine. RN and Respiratory Therapist at b/s. Pt would benefit from continued OT to increase functional independence and quality of life. Treatment: Pt required vc's for proper technique/safety with hand placement/body mechanics/posture for bed mobility/ADLs. Pt required vc's for sequencing/initiation of ADLs/functional transfers. Pt required increased time to complete ADLs/functional transfers due to SOB/Hypoxia - rest breaks. 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 Pt appeared to have tolerated session fairly. Pt demo'd fair understanding of education provided. Continue to educate.

## 2021-02-22 NOTE — PROGRESS NOTES
SpO2/FiO2 ratio: 161.67  I Time/ I Time %: 0.9 s  Mask Type: Full face mask  Mask Size: Small          CPAP/EPAP: 8 cmH2O     CURRENT MEDS :  Scheduled Meds:   docusate sodium  100 mg Oral BID    polyethylene glycol  17 g Oral Daily    vancomycin  1,000 mg Intravenous Q24H    bisacodyl  10 mg Rectal Q6H    enoxaparin  1 mg/kg Subcutaneous BID    sodium chloride flush  10 mL Intravenous 2 times per day    ipratropium-albuterol  1 ampule Inhalation Q4H While awake    guaiFENesin  400 mg Oral TID    sodium chloride flush  10 mL Intravenous 2 times per day    amLODIPine  5 mg Oral Daily    baclofen  20 mg Oral Nightly    vitamin D  2,000 Units Oral Daily    trospium  20 mg Oral BID AC    sodium chloride flush  10 mL Intravenous 2 times per day    budesonide  0.5 mg Nebulization BID    And    Arformoterol Tartrate  15 mcg Nebulization BID       Physical Exam:  General Appearance: appears comfortable in no acute distress. HEENT: Normocephalic atraumatic without obvious abnormality   Neck: Lips, mucosa, and tongue normal.  Supple, symmetrical, trachea midline, no adenopathy;thyroid:  no enlargement/tenderness/nodules or JVD. Lung: Breath sounds CTA, diminished. Respirations   unlabored. Symmetrical expansion. Heart: RRR, normal S1, S2. No MRG  Abdomen: Soft, NT, ND. BS present x 4 quadrants. No bruit or organomegaly. Extremities: Pedal pulses 2+ symmetric b/l. Extremities normal, no cyanosis, clubbing, or edema. Musculokeletal: No joint swelling, no muscle tenderness. ROM normal in all joints of extremities. Neurologic: Mental status: Alert and Oriented X3 . Pertinent/ New Labs and Imaging Studies     Imaging Personally Reviewed:  2/19/2021  Coarse interstitial markings in the lungs.  There are bilateral lower lung   opacities and probable pleural effusions. Atherosclerotic disease in the thoracic aorta. Air-filled bowel loops in the upper abdomen. Enteric tube extends subdiaphragmatic with distal tip and side port   projecting over the left upper quadrant.  The distal tip projects towards the   upper and lateral aspect of the left upper quadrant. Worsening of bilateral lower lung airspace opacities.  Finding may represent   increase in pleural effusion/compressive atelectasis. ECHO  2/20  Summary   Left ventricle size is normal.   Normal left ventricular wall thickness. Overall ejection fraction is low normal .   No regional wall motion abnormalities seen. E/A flow reversal noted. Suggestive of diastolic dysfunction. Right ventricle global systolic function is mildly reduced . Right ventricular septum flattened in systole consistent with RV pressure   overload. Physiologic and/or trace mitral regurgitation is present. Moderate tricuspid regurgitation. RVSP is 61 mmHg. Pulmonary hypertension is moderate . Labs:  Lab Results   Component Value Date    WBC 4.4 02/22/2021    HGB 10.9 02/22/2021    HCT 37.2 02/22/2021    MCV 88.6 02/22/2021    MCH 26.0 02/22/2021    MCHC 29.3 02/22/2021    RDW 21.4 02/22/2021    PLT 70 02/22/2021    MPV NOT CALC 02/22/2021     Lab Results   Component Value Date     02/22/2021    K 4.3 02/22/2021    K 4.3 02/22/2021     02/22/2021    CO2 40 02/22/2021    BUN 22 02/22/2021    CREATININE 0.9 02/22/2021    LABALBU 2.7 02/22/2021    CALCIUM 8.3 02/22/2021    GFRAA >60 02/22/2021    LABGLOM >60 02/22/2021     Lab Results   Component Value Date    PROTIME 12.0 04/04/2019    INR 1.1 04/04/2019     Recent Labs     02/19/21  1428   PROBNP 5,945*     Recent Labs     02/21/21  0551   PROCAL 0.60*     This SmartLink has not been configured with any valid records. Assessment:    1. Acute bilateral pulmonary embolism  2. Acute on chronic respiratory failure secondary to above  3. bilateral pleural effusions L>R with atelectasis/infiltrate  4. COPD with bullous emphysema  5.  Non-small cell lung cancer

## 2021-02-22 NOTE — PROGRESS NOTES
[facility] PROGRESS NOTE      Chief complaint: Follow-up of Gram-positive cocci in blood cultures     The patient is a 78 y.o. male with history of COPD, non-small cell lung cancer status post radiation therapy complicated by radiation fibrosis, chronic respiratory failure on continuous 3 L/min oxygen, hypertension, presented on 02/16 with worsening shortness of breath found to have pulmonary embolism. On admission, he was afebrile and hemodynamically stable with no leukocytosis. Chest CTA showed right middle lobe and left lower lobe bilateral pulmonary embolism, advanced COPD with patchy bibasilar infiltrates and pleural effusion, 2 x 1.5 cm nodule in the lingula marginally increased and indeterminate for recurrent malignancy. CT abdomen and pelvis showed heterogenous liver concerning for liver disease, progressive abdominal aortic aneurysm measuring 5 x 4.1 cm with peripheral clot, partially distended bladder with wall thickening, slight thickening of colonic wall. Serum procalcitonin level was elevated at 3.13 ng/mL. SARS-CoV-2 PCR and urine Streptococcus pneumoniae and Legionella antigens were negative. Respiratory Gram stain and culture showed more than 25 PMNs per LPF and more than 10 epithelial cells per LPF, rare gram-positive cocci in pairs, no growth to date. Blood cultures showed gram-positive cocci in chains and Staphylococcus haemolyticus and Streptococcus sp. He underwent thoracentesis on 02/18 during which 250 mL of red-tinged pleural fluid (transudative by protein) was removed. He received a dose of piperacillin-tazobactam and azithromycin on admission. Ceftriaxone was started on 02/17. Subjective: Patient was seen and examined. No chills, no abdominal pain, no diarrhea, no rash, no itching.  On nasal canula  Objective:    Vitals:    02/22/21 1255   BP: 136/73   Pulse: 94   Resp: 23   Temp: 98.2 °F (36.8 °C)   SpO2: 92%     Constitutional: Alert, not in distress Respiratory: Clear breath sounds, no crackles, no wheezes  Cardiovascular: Regular rate and rhythm, no murmurs  Gastrointestinal: Bowel sounds present, soft, non tender, n/g  Skin: Warm and dry, no active dermatoses  Musculoskeletal: No joint swelling, no joint erythema    Laboratory:  Lab Results   Component Value Date    WBC 4.4 (L) 02/22/2021    WBC 4.8 02/21/2021    WBC 5.8 02/20/2021    HGB 10.9 (L) 02/22/2021    HCT 37.2 02/22/2021    MCV 88.6 02/22/2021    PLT 70 (L) 02/22/2021     Lab Results   Component Value Date    NEUTROABS 3.11 02/22/2021    NEUTROABS 3.33 02/21/2021    NEUTROABS 5.22 02/20/2021     Lab Results   Component Value Date    CRP 10.7 (H) 02/17/2021     No results found for: CRPHS  Lab Results   Component Value Date    SEDRATE 2 02/17/2021     Lab Results   Component Value Date     (H) 02/22/2021     (H) 02/22/2021    ALKPHOS 72 02/22/2021    BILITOT 0.6 02/22/2021     Lab Results   Component Value Date     02/22/2021    K 4.3 02/22/2021    K 4.3 02/22/2021     02/22/2021    CO2 40 02/22/2021    BUN 22 02/22/2021    CREATININE 0.9 02/22/2021    CREATININE 1.0 02/21/2021    CREATININE 1.3 02/20/2021    GFRAA >60 02/22/2021    LABGLOM >60 02/22/2021    GLUCOSE 87 02/22/2021    PROT 6.0 02/22/2021    LABALBU 2.7 02/22/2021    CALCIUM 8.3 02/22/2021    BILITOT 0.6 02/22/2021    ALKPHOS 72 02/22/2021     02/22/2021     02/22/2021       Radiology: Chest CTA: Filling defect in the segmental and subsegmental branches of right middle   lobe and left lower lobe concerning for bilateral pulmonary embolism without   significant cardiac strain   Advanced COPD with patchy bibasilar infiltrates and pleural effusion   concerning for pneumonia and or edema.    2 x 1.5 cm nodule in the lingula which is marginally increased and   indeterminate for recurrent malignancy CT abdomen pelvis: Constipation with mild nonspecific thickening of the colonic wall. Mild thickening of the urinary bladder.  Cystitis is considered. Abdominal aortic aneurysm measuring 4.1 x 5 cm. Microbiology:     Blood cx  No results found for: BLOODCULT1  Culture, Blood 2   Date Value Ref Range Status   02/18/2021 24 Hours no growth  Preliminary   02/17/2021 24 Hours no growth  Preliminary     Organism   Date Value Ref Range Status   02/16/2021 Streptococcus species (A)  Preliminary   02/16/2021 Staphylococcus haemolyticus (A)  Preliminary       Smear, Respiratory   Date Value Ref Range Status   02/17/2021   Final    Group 3: >25 PMN's/LPF and >10 Epithelial cells/LPF  Moderate Polymorphonuclear leukocytes  Few Epithelial cells  Rare Gram positive cocci in pairs         CULTURE, RESPIRATORY   Date Value Ref Range Status   02/17/2021 Oral Pharyngeal Kin reduced  Final     BODY FLUID CULTURE  Body Fluid Culture, Sterile   Date Value Ref Range Status   02/18/2021 Growth not present  Final       URINE CULTURE  Urine Culture, Routine   Date Value Ref Range Status   02/16/2021 Growth not present  Final   05/18/2020 Growth not present  Final   02/24/2020 (A)  Final    ,000 CFU/mL  Mixed kin isolated. Further workup and sensitivity testing  is not routinely indicated and will not be performed.   Mixed kin isolated includes:  Gram negative rods  Gram positive organism     02/24/2020 >100,000 CFU/ml  Final       MRSA Culture Only   Date Value Ref Range Status   02/17/2021 Methicillin resistant Staph aureus not isolated  Final       Assessment:  Staphylococcus haemolyticus and Streptococcus sp in blood cultures, probably contaminant  Pulmonary embolism  Acute on chronic respiratory failure  Pleural effusion, status post left thoracentesis on 02/18  Micro continues to report organisms     Now staph haemolyticus, staph epidermidis, staph warneri  I cannot call these contaminantts at this juncture Also daniel zimmerman and tom mcnamara   Reviewed Ct abd       Recommendations: Follow up blood and pleural fluid cultures. NGTD  Monitor clinically off antibiotics for now. Trend procalcitonin. -decreasing  Continue supportive care. I am going to start vanco  TTE      Thank you for involving me in the care of [employer].  .    Electronically signed by An Latham MD on 2/22/2021 at 1:13 PM     .

## 2021-02-22 NOTE — PROGRESS NOTES
Pharmacy Consultation Note  (Antibiotic Dosing and Monitoring)    Initial consult date: 2/22/21  Consulting physician: Dr. Stella Ramírez  Drug(s): vancomycin  Indication: blood stream infection      Age/  Gender Height Weight IBW Dosing weight  Allergy Information   80 y.o./male 6' 1.5\" (186.7 cm) 155 lb (70.3 kg)     Ideal body weight: 81 kg (178 lb 10.9 oz)  74 kg  Codeine          Other anti-infectives Start date Stop date                         Date  Tmax WBC BUN/CR UOP CrCL  (mL/min) Drug/Dose Time   Given Level(s)   (Time) Comments   2/22 afebrile 4.4  22/0.9 0.7 66 Vancomycin 1000 mg IV q 24 hr 1420             Random level with am labs                                   Intake/Output Summary (Last 24 hours) at 2/22/2021 1611  Last data filed at 2/22/2021 1429  Gross per 24 hour   Intake 900 ml   Output 1200 ml   Net -300 ml       Average urine output:    Cultures:    Site Date Result                    No results for input(s): Rich Signs in the last 72 hours. Historical Cultures:  Organism   Date Value Ref Range Status   02/16/2021 Staphylococcus epidermidis (A)  Final   02/16/2021 Kocuria kristinae (A)  Final   02/16/2021 Staphylococcus epidermidis (A)  Final   02/16/2021 Staphylococcus warneri (A)  Final   02/16/2021 Streptococcus viridans group (A)  Final   02/16/2021 Staphylococcus haemolyticus (A)  Final     No results for input(s): BC in the last 72 hours. Radiology:      Assessment:  · 78year old male started on vancomycin for staph and strep bacteremia.    · Goal trough = 15 - 20 mcg/ml  · Scr 0.9    Plan:  · Vancomycin 1000 mg IV q 24 hr  · Vanco random with am labs to assess how patient is renally clearing  · Pharmacist will follow and monitor/adjust dosing as necessary    Demi Fowler, MigdaliaD, BCPS 2/22/2021 4:11 PM

## 2021-02-22 NOTE — PROGRESS NOTES
GENERAL SURGERY  DAILY PROGRESS NOTE  2/22/2021    Subjective:  Patient on BiPAP. Unsure if having recent BM. No nausea or vomiting. Passing flatus. Objective:  /78   Pulse 82   Temp 97.7 °F (36.5 °C) (Temporal)   Resp 18   Ht 6' 1.5\" (1.867 m)   Wt 163 lb 3.2 oz (74 kg)   SpO2 98%   BMI 21.24 kg/m²     GENERAL:  Laying in bed, awake, alert, cooperative, no apparent distress  HEAD: Normocephalic, atraumatic  EYES: No sclera icterus  LUNGS:  On BiPAP  CARDIOVASCULAR:  RR  ABDOMEN:  Soft, non-tender, non-distended. Diffusely tympanic  EXTREMITIES: No edema   SKIN: Warm     Assessment/Plan:  78 y.o. male with shortness of breath possible ileus versus large bowel obstruction    -Overall care per primary  -Patient handling diet and having bowel movement/passing gas  -Low suspicion for small bowel obstruction, possible ileus seems to be resolved  -Bowel regimen of suppositories, glycolax and colace  -Advance diet per primary  -No acute surgical intervention needed at this time  -Please call if there are any remaining questions    Electronically signed by Ariane Chase DO on 2/22/2021 at 8:04 AM     The patient was seen and examined and the chart was reviewed. Overall, the patient has shown improvement. The patient denies nausea and vomiting. The patient states that he has been passing gas. He denies a bowel movement today. On examination: The abdomen is soft with slight distention without tenderness.     Assessment:    Resolving ileus    Plan:    High-fiber diet  Continue the bowel regimen

## 2021-02-22 NOTE — PROGRESS NOTES
Date: 2/22/2021    Time: 3:19 AM    Patient Placed On BIPAP/CPAP/ Non-Invasive Ventilation? No    If no must comment. On from previous shift  Facial area red/color change? No           If YES are Blister/Lesion present? No   If yes must notify nursing staff  BIPAP/CPAP skin barrier?   Yes    Skin barrier type:mepilexlite       Comments:        Tae Cohen

## 2021-02-22 NOTE — PROGRESS NOTES
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 Bed is on first floor and bath is on first floor. Pt ambulated with no AD prior to admission. Pt reports he uses 3L/min O2 via NC as needed.     OBJECTIVE:    Initial Evaluation  Date: 21 Treatment  21 Short Term/ Long Term   Goals   AM-PAC 6 Clicks 75/32  00/56     Was pt agreeable to Eval/treatment? yes  yes     Does pt have pain? Pt stating \"I don't know\" when asked, no signs of discomfort  no c/o pain during session     Bed Mobility  Rolling: NT  Supine to sit: NT  Sit to supine: NT  Scooting: NT  Sitting EOB upon arrival, remained EOB Rolling: SBA  Supine to sit: SBA  Sit to supine: NT  Scooting: SBA to EOB Rolling: Independent  Supine to sit: Independent  Sit to supine: Independent  Scooting: Independent   Transfers Sit to stand: SBA  Stand to sit: SBA  Stand pivot: SBA with no AD  Sit to stand: Lilli  Stand to sit: Lilli  Stand pivot: Lilli with FWW Sit to stand: Independent  Stand to sit: Independent  Stand pivot:  Mod Independent with AAD   Ambulation    60 feet with no AD with SBA  60 feet with no AD with Min A  25 feet x2 reps with FWW with Min<>Mod A >150 feet with AAD with Mod Independent    Stair negotiation: ascended and descended  NT NT  2 steps with no rails Mod Independent    ROM BUE:  WNL  BLE:  WNL     Strength BUE:  WNL  BLE:  WNl       Balance Sitting EOB:  Independent  Dynamic Standing:  SBA>Min A with no AD Sitting EOB:  SBA  Dynamic Standing: Min<>Mod A with FWW Sitting EOB:  Independent  Dynamic Standing:   Mod Independent with AAD      Pt is A & O x 3, intermittent confusion with conversation  Sensation:  Pt denies numbness and tingling to extremities  Edema:  unremarkable    Vitals:  SPO2 90% at rest at 11 L/min via HFNC  Completed ambulation on 15 L/min Increased (flow due to restrictions of portable tank)  Returned to wall connection seated in chair post amb at 11L/ min as pt found initially, SPO2 83%  Discussed with RN who requests increased flow rate to 13L/min via HFNC, recovered to 90% within 2 minutes    Patient education  Pt educated on safety with transfers, breathing technique, improved walker management and balance    Patient response to education:   Pt verbalized understanding Pt demonstrated skill Pt requires further education in this area   yes yes yes     ASSESSMENT: Comments:  RN clears pt for therapy this date. Pt resting semi-supine upon arrival, agreeable to PT eval. Pt completed bed mobility with slow pace of movement and cues for improved technique to ease transition. Pt reports increased SOB and mild dizziness with upright sitting that resolved with additional time at EOB. Pt completed 2 bouts of ambulation with forward flexed posture and increased assistance required to avoid LOB during turns. Pt required seated rest break to allow dyspnea to subside between ambulation distances, multiple attempts to obtain SPO2 between ambulation unsuccessful using finger pulse ox. Pt seated in chair upon completion of ambulation and readings obtained from toe and finger, pt presents with increased respiratory rate and shallow breathing. PT discussed pt response to activity and SPO2 reading with RN and flow rate increased as noted above. Pt remained OOB to chair with all needs in reach upon completion of session. Treatment:  Patient practiced and was instructed in the following treatment:    ? Bed mobility: cues for sequencing and technique to ease transitions  ? Transfer training: cues for safe hand placement and lift/lower assistance for sit<>stand, cues for safe walker management during pivot transfer  ? Gait training: cues for upright posture and widened KAYLYN, manual assist for walker approximation and balance with facilitation of L weight shift to improve midline alignment, increased assist required during turns  ? Therapeutic activities: cues for upright posture fori mproved lung excursion and respiratory function, max verbal/visual cues for PLB technique. PLAN:    Patient is making continued progress towards established goals. Will continue with current POC.         PLAN:    Time in  0835  Time out  0907    Total Treatment Time  32    CPT codes:  [] Gait training 20639 0 minutes  [] Manual therapy 14954 0 minutes  [x] Therapeutic activities 55180 32 minutes [] Therapeutic exercises 55704 0 minutes  [] Neuromuscular reeducation 35919 0 minutes      Eddie Mi, PT, DPT  [id number]

## 2021-02-22 NOTE — PLAN OF CARE
Problem: Falls - Risk of:  Goal: Will remain free from falls  Description: Will remain free from falls  Outcome: Met This Shift     Problem: Skin Integrity:  Goal: Will show no infection signs and symptoms  Description: Will show no infection signs and symptoms  Outcome: Met This Shift     Problem: Breathing Pattern - Ineffective:  Goal: Ability to achieve and maintain a regular respiratory rate will improve  Description: Ability to achieve and maintain a regular respiratory rate will improve  Outcome: Not Met This Shift

## 2021-02-22 NOTE — PROGRESS NOTES
Hospitalist Progress Note      SYNOPSIS: Patient admitted on 2/16/2021 for <principal problem not specified>      SUBJECTIVE:    Patient seen and examined  Records reviewed. Continues with bipap qhs and prn  Wife in room - updated    Tolerating lovenox injections  No fever or chills  No cp/ct/orthopnea    DIET: DIET LOW FIBER;  CODE: Limited    Intake/Output Summary (Last 24 hours) at 2/22/2021 1210  Last data filed at 2/22/2021 0559  Gross per 24 hour   Intake 1400 ml   Output 1100 ml   Net 300 ml       OBJECTIVE:    /66   Pulse 85   Temp 97.8 °F (36.6 °C) (Temporal)   Resp 19   Ht 6' 1.5\" (1.867 m)   Wt 163 lb 3.2 oz (74 kg)   SpO2 97%   BMI 21.24 kg/m²     General appearance: No apparent distress   HEENT:  Conjunctivae/corneas clear. Neck: Supple. No jugular venous distention. Respiratory: Clear to auscultation bilaterally, normal respiratory effort  Cardiovascular: Regular rate rhythm, normal S1-S2  Abdomen: Soft, nontender, nondistended  Musculoskeletal: No clubbing, cyanosis, no bilateral lower extremity edema. Skin:  No rashes  on visible skin  Neurologic: awake, alert and following commands     ASSESSMENT:    Active Problems:    Pulmonary embolism (HCC)  Resolved Problems:    * No resolved hospital problems.  *       PLAN:    Bilateral pulmonary emboli in setting of NSCLC  Lovenox 1 mg/kg   Consult pulmonology  Bipap qhs and prn       Chest xray with infiltrates - COPD  Admitted on IV antibiotics with ceftriaxone and azithromycin  Per ID IV AB was DC - no fever - no leukocytosis  Chronically oxygen dependent   Chronic respiratory failure on 3 L at home      Contaminated blood cx  Blood culture came back positive-Gram-positive cocci (methicillin-resistant Staphylococcus species not aureus by PCR) in blood cultures, probably contaminant  Consult ID for IV antibiotic management- DC AB per ID - monitor pt without AB per ID recommendation      Urinary tract infection Awaiting for urine culture results     Chronic renal insufficiency stage III  Continue IV fluid resuscitation  Avoid nephrotoxins     Transaminates  AST/ ALT increase 2 times since admission  hepatic panel hepatitis BC pending  Right upper quadrant ultrasound    Pleural effusion - s/p thoracentesis  Thoracentesis done on 2-18    AAA  Noted on ct scan - Abdominal aortic aneurysm measuring 4.2 x 4.8 cm  Follow serially          DVT Prophylaxis: On Lovenox    DISPOSITION: home    Medications:  REVIEWED DAILY    Infusion Medications   Scheduled Medications    docusate sodium  100 mg Oral BID    polyethylene glycol  17 g Oral Daily    bisacodyl  10 mg Rectal Q6H    enoxaparin  1 mg/kg Subcutaneous BID    sodium chloride flush  10 mL Intravenous 2 times per day    ipratropium-albuterol  1 ampule Inhalation Q4H While awake    guaiFENesin  400 mg Oral TID    sodium chloride flush  10 mL Intravenous 2 times per day    amLODIPine  5 mg Oral Daily    baclofen  20 mg Oral Nightly    vitamin D  2,000 Units Oral Daily    trospium  20 mg Oral BID AC    sodium chloride flush  10 mL Intravenous 2 times per day    budesonide  0.5 mg Nebulization BID    And    Arformoterol Tartrate  15 mcg Nebulization BID     PRN Meds: sodium chloride flush, perflutren lipid microspheres, sodium chloride flush, albuterol, sodium chloride flush, promethazine **OR** ondansetron    Labs:     Recent Labs     02/20/21  0712 02/21/21  0551 02/22/21  0658   WBC 5.8 4.8 4.4*   HGB 11.6* 11.2* 10.9*   HCT 39.6 38.9 37.2   PLT 64* 71* 70*       Recent Labs     02/20/21  0711 02/21/21  0551 02/22/21  0658    147* 143   K 5.0  5.0 4.8  4.8 4.3  4.3    104 101   CO2 35* 41* 40*   BUN 36* 28* 22   CREATININE 1.3* 1.0 0.9   CALCIUM 8.6 8.7 8.3*       Recent Labs     02/20/21  0711 02/21/21  0551 02/22/21  0658   PROT 6.2* 6.3* 6.0*   ALKPHOS 81 78 72   * 389* 272*   * 238* 157*   BILITOT 0.5 0.7 0.6 No results for input(s): INR in the last 72 hours. No results for input(s): Lewanda Bene in the last 72 hours. Chronic labs:    Lab Results   Component Value Date    PSA 0.46 09/22/2020    INR 1.1 04/04/2019       Radiology: REVIEWED DAILY    +++++++++++++++++++++++++++++++++++++++++++++++++  [place], [place]  +++++++++++++++++++++++++++++++++++++++++++++++++  NOTE: This report was transcribed using voice recognition software. Every effort was made to ensure accuracy; however, inadvertent computerized transcription errors may be present.

## 2021-02-22 NOTE — CARE COORDINATION
Spoke to patient and sister at bedside to discuss transition of care. He is hopeful to return home. We did discuss ANTOLIN if his O2 needs exceed home going capacity and he stated he will review list with his sister at that time. His tank at home goes to 5 liters (through [facility]). He is currently on 15 liters high flow.  CM will continue to follow and get therapy updates closer to discharge    Xiomy Doll BSN, RN  [address] Case Management  [phone]

## 2021-02-23 ENCOUNTER — APPOINTMENT (OUTPATIENT)
Dept: GENERAL RADIOLOGY | Age: 81
DRG: 175 | End: 2021-02-23
Payer: MEDICARE

## 2021-02-23 LAB
ALBUMIN SERPL-MCNC: 2.6 G/DL (ref 3.5–5.2)
ALP BLD-CCNC: 74 U/L (ref 40–129)
ALT SERPL-CCNC: 218 U/L (ref 0–40)
ANION GAP SERPL CALCULATED.3IONS-SCNC: 2 MMOL/L (ref 7–16)
ANISOCYTOSIS: ABNORMAL
AST SERPL-CCNC: 116 U/L (ref 0–39)
BASOPHILS ABSOLUTE: 0 E9/L (ref 0–0.2)
BASOPHILS RELATIVE PERCENT: 0.9 % (ref 0–2)
BILIRUB SERPL-MCNC: 0.6 MG/DL (ref 0–1.2)
BLOOD CULTURE, ROUTINE: NORMAL
BLOOD CULTURE, ROUTINE: NORMAL
BUN BLDV-MCNC: 20 MG/DL (ref 8–23)
CALCIUM SERPL-MCNC: 8.3 MG/DL (ref 8.6–10.2)
CHLORIDE BLD-SCNC: 95 MMOL/L (ref 98–107)
CO2: 41 MMOL/L (ref 22–29)
CREAT SERPL-MCNC: 0.9 MG/DL (ref 0.7–1.2)
CULTURE, BLOOD 2: NORMAL
CULTURE, BLOOD 2: NORMAL
EOSINOPHILS ABSOLUTE: 0 E9/L (ref 0.05–0.5)
EOSINOPHILS RELATIVE PERCENT: 1.5 % (ref 0–6)
GFR AFRICAN AMERICAN: >60
GFR NON-AFRICAN AMERICAN: >60 ML/MIN/1.73
GLUCOSE BLD-MCNC: 97 MG/DL (ref 74–99)
HCT VFR BLD CALC: 35 % (ref 37–54)
HEMOGLOBIN: 10.6 G/DL (ref 12.5–16.5)
HYPOCHROMIA: ABNORMAL
LYMPHOCYTES ABSOLUTE: 0.37 E9/L (ref 1.5–4)
LYMPHOCYTES RELATIVE PERCENT: 7.8 % (ref 20–42)
MCH RBC QN AUTO: 26.2 PG (ref 26–35)
MCHC RBC AUTO-ENTMCNC: 30.3 % (ref 32–34.5)
MCV RBC AUTO: 86.6 FL (ref 80–99.9)
MONOCYTES ABSOLUTE: 0.41 E9/L (ref 0.1–0.95)
MONOCYTES RELATIVE PERCENT: 8.7 % (ref 2–12)
NEUTROPHILS ABSOLUTE: 3.86 E9/L (ref 1.8–7.3)
NEUTROPHILS RELATIVE PERCENT: 83.5 % (ref 43–80)
NUCLEATED RED BLOOD CELLS: 0.9 /100 WBC
PDW BLD-RTO: 21 FL (ref 11.5–15)
PLATELET # BLD: 63 E9/L (ref 130–450)
PLATELET CONFIRMATION: NORMAL
PMV BLD AUTO: ABNORMAL FL (ref 7–12)
POIKILOCYTES: ABNORMAL
POLYCHROMASIA: ABNORMAL
POTASSIUM REFLEX MAGNESIUM: 3.9 MMOL/L (ref 3.5–5)
POTASSIUM SERPL-SCNC: 3.9 MMOL/L (ref 3.5–5)
RBC # BLD: 4.04 E12/L (ref 3.8–5.8)
SODIUM BLD-SCNC: 138 MMOL/L (ref 132–146)
TARGET CELLS: ABNORMAL
TEAR DROP CELLS: ABNORMAL
TOTAL PROTEIN: 6 G/DL (ref 6.4–8.3)
VANCOMYCIN RANDOM: <4 MCG/ML (ref 5–40)
WBC # BLD: 4.6 E9/L (ref 4.5–11.5)

## 2021-02-23 PROCEDURE — 2700000000 HC OXYGEN THERAPY PER DAY

## 2021-02-23 PROCEDURE — 6370000000 HC RX 637 (ALT 250 FOR IP): Performed by: INTERNAL MEDICINE

## 2021-02-23 PROCEDURE — 6370000000 HC RX 637 (ALT 250 FOR IP): Performed by: STUDENT IN AN ORGANIZED HEALTH CARE EDUCATION/TRAINING PROGRAM

## 2021-02-23 PROCEDURE — 85025 COMPLETE CBC W/AUTO DIFF WBC: CPT

## 2021-02-23 PROCEDURE — 94660 CPAP INITIATION&MGMT: CPT

## 2021-02-23 PROCEDURE — 36415 COLL VENOUS BLD VENIPUNCTURE: CPT

## 2021-02-23 PROCEDURE — 6360000002 HC RX W HCPCS: Performed by: INTERNAL MEDICINE

## 2021-02-23 PROCEDURE — 80053 COMPREHEN METABOLIC PANEL: CPT

## 2021-02-23 PROCEDURE — 94640 AIRWAY INHALATION TREATMENT: CPT

## 2021-02-23 PROCEDURE — 2060000000 HC ICU INTERMEDIATE R&B

## 2021-02-23 PROCEDURE — 2580000003 HC RX 258: Performed by: FAMILY MEDICINE

## 2021-02-23 PROCEDURE — 6370000000 HC RX 637 (ALT 250 FOR IP): Performed by: FAMILY MEDICINE

## 2021-02-23 PROCEDURE — 2580000003 HC RX 258: Performed by: INTERNAL MEDICINE

## 2021-02-23 PROCEDURE — 80202 ASSAY OF VANCOMYCIN: CPT

## 2021-02-23 PROCEDURE — 6360000002 HC RX W HCPCS: Performed by: FAMILY MEDICINE

## 2021-02-23 PROCEDURE — 71045 X-RAY EXAM CHEST 1 VIEW: CPT

## 2021-02-23 RX ORDER — PSEUDOEPHEDRINE HCL 30 MG
100 TABLET ORAL 2 TIMES DAILY
Qty: 30 CAPSULE | Refills: 3 | Status: SHIPPED | OUTPATIENT
Start: 2021-02-23

## 2021-02-23 RX ORDER — POLYETHYLENE GLYCOL 3350 17 G/17G
17 POWDER, FOR SOLUTION ORAL DAILY
Qty: 527 G | Refills: 1 | Status: SHIPPED | OUTPATIENT
Start: 2021-02-23 | End: 2021-03-25

## 2021-02-23 RX ORDER — BISACODYL 10 MG
10 SUPPOSITORY, RECTAL RECTAL DAILY PRN
Qty: 120 SUPPOSITORY | Refills: 0 | Status: SHIPPED | OUTPATIENT
Start: 2021-02-23 | End: 2021-03-25

## 2021-02-23 RX ADMIN — DOCUSATE SODIUM 100 MG: 100 CAPSULE, LIQUID FILLED ORAL at 21:03

## 2021-02-23 RX ADMIN — APIXABAN 10 MG: 5 TABLET, FILM COATED ORAL at 21:02

## 2021-02-23 RX ADMIN — BISACODYL 10 MG: 10 SUPPOSITORY RECTAL at 02:01

## 2021-02-23 RX ADMIN — ARFORMOTEROL TARTRATE 15 MCG: 15 SOLUTION RESPIRATORY (INHALATION) at 20:49

## 2021-02-23 RX ADMIN — GUAIFENESIN 400 MG: 400 TABLET ORAL at 09:22

## 2021-02-23 RX ADMIN — TROSPIUM CHLORIDE 20 MG: 20 TABLET, FILM COATED ORAL at 06:39

## 2021-02-23 RX ADMIN — APIXABAN 10 MG: 5 TABLET, FILM COATED ORAL at 09:22

## 2021-02-23 RX ADMIN — BUDESONIDE 500 MCG: 0.5 SUSPENSION RESPIRATORY (INHALATION) at 08:33

## 2021-02-23 RX ADMIN — BISACODYL 10 MG: 10 SUPPOSITORY RECTAL at 14:09

## 2021-02-23 RX ADMIN — DOCUSATE SODIUM 100 MG: 100 CAPSULE, LIQUID FILLED ORAL at 09:22

## 2021-02-23 RX ADMIN — IPRATROPIUM BROMIDE AND ALBUTEROL SULFATE 1 AMPULE: 2.5; .5 SOLUTION RESPIRATORY (INHALATION) at 15:59

## 2021-02-23 RX ADMIN — VANCOMYCIN HYDROCHLORIDE 1500 MG: 10 INJECTION, POWDER, LYOPHILIZED, FOR SOLUTION INTRAVENOUS at 09:29

## 2021-02-23 RX ADMIN — Medication 10 ML: at 09:29

## 2021-02-23 RX ADMIN — TROSPIUM CHLORIDE 20 MG: 20 TABLET, FILM COATED ORAL at 17:41

## 2021-02-23 RX ADMIN — BACLOFEN 20 MG: 10 TABLET ORAL at 21:02

## 2021-02-23 RX ADMIN — AMLODIPINE BESYLATE 5 MG: 5 TABLET ORAL at 09:22

## 2021-02-23 RX ADMIN — IPRATROPIUM BROMIDE AND ALBUTEROL SULFATE 1 AMPULE: 2.5; .5 SOLUTION RESPIRATORY (INHALATION) at 20:49

## 2021-02-23 RX ADMIN — BUDESONIDE 500 MCG: 0.5 SUSPENSION RESPIRATORY (INHALATION) at 20:49

## 2021-02-23 RX ADMIN — GUAIFENESIN 400 MG: 400 TABLET ORAL at 14:09

## 2021-02-23 RX ADMIN — IPRATROPIUM BROMIDE AND ALBUTEROL SULFATE 1 AMPULE: 2.5; .5 SOLUTION RESPIRATORY (INHALATION) at 11:55

## 2021-02-23 RX ADMIN — BISACODYL 10 MG: 10 SUPPOSITORY RECTAL at 09:22

## 2021-02-23 RX ADMIN — Medication 10 ML: at 21:02

## 2021-02-23 RX ADMIN — POLYETHYLENE GLYCOL 3350 17 G: 17 POWDER, FOR SOLUTION ORAL at 09:29

## 2021-02-23 RX ADMIN — GUAIFENESIN 400 MG: 400 TABLET ORAL at 21:02

## 2021-02-23 RX ADMIN — BISACODYL 10 MG: 10 SUPPOSITORY RECTAL at 21:03

## 2021-02-23 RX ADMIN — Medication 2000 UNITS: at 09:21

## 2021-02-23 RX ADMIN — ARFORMOTEROL TARTRATE 15 MCG: 15 SOLUTION RESPIRATORY (INHALATION) at 08:33

## 2021-02-23 RX ADMIN — IPRATROPIUM BROMIDE AND ALBUTEROL SULFATE 1 AMPULE: 2.5; .5 SOLUTION RESPIRATORY (INHALATION) at 08:34

## 2021-02-23 ASSESSMENT — PAIN SCALES - GENERAL
PAINLEVEL_OUTOF10: 0

## 2021-02-23 NOTE — PROGRESS NOTES
Pharmacy Consultation Note  (Antibiotic Dosing and Monitoring)    Initial consult date: 2/22/21  Consulting physician: Dr. Kadeem Ogden  Drug(s): vancomycin  Indication: blood stream infection      Age/  Gender Height Weight IBW Dosing weight  Allergy Information   80 y.o./male 6' 1.5\" (186.7 cm) 155 lb (70.3 kg)     Ideal body weight: 81 kg (178 lb 10.9 oz)  74 kg  Codeine          Other anti-infectives Start date Stop date                         Date  Tmax WBC BUN/CR UOP CrCL  (mL/min) Drug/Dose Time   Given Level(s)   (Time) Comments   2/22 afebrile 4.4  22/0.9 0.7 66 Vancomycin 1000 mg IV q 24 hr 1420     2/23 afebrile -- 20/0.9 0.6 67 Vancomycin 1500 mg IV q 18 hr (0900) Random level with am labs <4 mcg/mL @0442                                  Intake/Output Summary (Last 24 hours) at 2/23/2021 0852  Last data filed at 2/23/2021 0645  Gross per 24 hour   Intake 240 ml   Output 1150 ml   Net -910 ml       Average urine output:    Cultures:    Site Date Result                    No results for input(s): Chip Kaykay in the last 72 hours. Historical Cultures:  Organism   Date Value Ref Range Status   02/16/2021 Staphylococcus epidermidis (A)  Final   02/16/2021 Kocuria kristinae (A)  Final   02/16/2021 Staphylococcus epidermidis (A)  Final   02/16/2021 Staphylococcus warneri (A)  Final   02/16/2021 Streptococcus viridans group (A)  Final   02/16/2021 Staphylococcus haemolyticus (A)  Final     No results for input(s): BC in the last 72 hours. Radiology:      Assessment:  · 78year old male started on vancomycin for staph and strep bacteremia.    · Goal trough = 15 - 20 mcg/ml  · 2/23: Scr 0.9, patient clearing drug well- vanco random is <4 mcg/mL (~15 hours post dose)    Plan:  · Change to vancomycin 1500 mg IV q 18 hr  · A vanco trough level will be obtained when steady-state is reached  · Pharmacist will follow and monitor/adjust dosing as necessary    Mary Jo Conrad, PharmD, BCPS 2/23/2021 8:52 AM

## 2021-02-23 NOTE — PROGRESS NOTES
Pt on call light, requesting to come off bipap and refusing to wear the rest of the night. Pt nodded in agreeance that he understands the risks of not wearing his bipap.   Pt placed on 15L HFNC, call light within reach, continuous pulse ox on and accurately reading

## 2021-02-23 NOTE — PROGRESS NOTES
Esmer Trimble M.D.,[facility]  Kasey Cross D.O., F.A.C.O.I., Jeff Nobles M.D. Ej Voss M.D., Jennifer Sorto M.D. Daisy Dominguez D.O. Daily Pulmonary Progress Note    Patient:  Wendi Yip 78 y.o. male MRN: [id number]     Date of Service: 2/23/2021      Synopsis     We are following patient for acute bilateral pulmonary embolism, acute on chronic respiratory failure, bilateral pleural effusions    \"CC\" shortness of breath    Code status: Limited      Subjective      Patient was seen and examined, still on continuous AVAPS. Lung sounds are diminished with scattered rhonchi. Review of Systems:  Constitutional: Denies fever, weight loss, night sweats, and fatigue  Skin: Denies pigmentation, dark lesions, and rashes   HEENT: Denies hearing loss, tinnitus, ear drainage, epistaxis, sore throat, and hoarseness. Cardiovascular: Denies palpitations, chest pain, and chest pressure. Respiratory: Denies cough, dyspnea at rest, hemoptysis, apnea, and choking.   Gastrointestinal: Denies nausea, vomiting, poor appetite, diarrhea, heartburn or reflux  Genitourinary: Denies dysuria, frequency, urgency or hematuria  Musculoskeletal: Denies myalgias, muscle weakness, and bone pain  Neurological: Denies dizziness, vertigo, headache, and focal weakness  Psychological: Denies anxiety and depression  Endocrine: Denies heat intolerance and cold intolerance  Hematopoietic/Lymphatic: Denies bleeding problems and blood transfusions    24-hour events:  none    Objective   Vitals: /77   Pulse 90   Temp 98.4 °F (36.9 °C) (Temporal)   Resp 27   Ht 6' 1.5\" (1.867 m)   Wt 164 lb 11.2 oz (74.7 kg)   SpO2 90%   BMI 21.43 kg/m²     I/O:    Intake/Output Summary (Last 24 hours) at 2/23/2021 1227  Last data filed at 2/23/2021 1129  Gross per 24 hour   Intake 540 ml   Output 1150 ml   Net -610 ml       Vent Information  Skin Assessment: Clean, dry, & intact  Vt Ordered: 550 mL FiO2 : 60 %  SpO2: 90 %  SpO2/FiO2 ratio: 156.67  I Time/ I Time %: 0.9 s  Mask Type: Full face mask  Mask Size: Small          CPAP/EPAP: 8 cmH2O     CURRENT MEDS :  Scheduled Meds:   vancomycin  1,500 mg Intravenous Q18H    docusate sodium  100 mg Oral BID    polyethylene glycol  17 g Oral Daily    apixaban  10 mg Oral BID    Followed by   Jessie Mejia ON 3/1/2021] apixaban  5 mg Oral BID    bisacodyl  10 mg Rectal Q6H    sodium chloride flush  10 mL Intravenous 2 times per day    ipratropium-albuterol  1 ampule Inhalation Q4H While awake    guaiFENesin  400 mg Oral TID    sodium chloride flush  10 mL Intravenous 2 times per day    amLODIPine  5 mg Oral Daily    baclofen  20 mg Oral Nightly    vitamin D  2,000 Units Oral Daily    trospium  20 mg Oral BID AC    sodium chloride flush  10 mL Intravenous 2 times per day    budesonide  0.5 mg Nebulization BID    And    Arformoterol Tartrate  15 mcg Nebulization BID       Physical Exam:  General Appearance: appears comfortable in no acute distress. HEENT: Normocephalic atraumatic without obvious abnormality   Neck: Lips, mucosa, and tongue normal.  Supple, symmetrical, trachea midline, no adenopathy;thyroid:  no enlargement/tenderness/nodules or JVD. Lung: Breath sounds diminished with scattered rhonchi. Respirations   unlabored. Symmetrical expansion. Heart: RRR, normal S1, S2. No MRG  Abdomen: Soft, NT, ND. BS present x 4 quadrants. No bruit or organomegaly. Extremities: Pedal pulses 2+ symmetric b/l. Extremities normal, no cyanosis, clubbing, or edema. Musculokeletal: No joint swelling, no muscle tenderness. ROM normal in all joints of extremities. Neurologic: Mental status: Alert and Oriented X3 . Pertinent/ New Labs and Imaging Studies     Imaging Personally Reviewed:  2/19/2021  Coarse interstitial markings in the lungs.  There are bilateral lower lung   opacities and probable pleural effusions. Atherosclerotic disease in the thoracic aorta. Air-filled bowel loops in the upper abdomen. Enteric tube extends subdiaphragmatic with distal tip and side port   projecting over the left upper quadrant.  The distal tip projects towards the   upper and lateral aspect of the left upper quadrant. Worsening of bilateral lower lung airspace opacities.  Finding may represent   increase in pleural effusion/compressive atelectasis. ECHO  2/20  Summary   Left ventricle size is normal.   Normal left ventricular wall thickness. Overall ejection fraction is low normal .   No regional wall motion abnormalities seen. E/A flow reversal noted. Suggestive of diastolic dysfunction. Right ventricle global systolic function is mildly reduced . Right ventricular septum flattened in systole consistent with RV pressure   overload. Physiologic and/or trace mitral regurgitation is present. Moderate tricuspid regurgitation. RVSP is 61 mmHg. Pulmonary hypertension is moderate . Labs:  Lab Results   Component Value Date    WBC 4.6 02/23/2021    HGB 10.6 02/23/2021    HCT 35.0 02/23/2021    MCV 86.6 02/23/2021    MCH 26.2 02/23/2021    MCHC 30.3 02/23/2021    RDW 21.0 02/23/2021    PLT 63 02/23/2021    MPV NOT CALC 02/23/2021     Lab Results   Component Value Date     02/23/2021    K 3.9 02/23/2021    K 3.9 02/23/2021    CL 95 02/23/2021    CO2 41 02/23/2021    BUN 20 02/23/2021    CREATININE 0.9 02/23/2021    LABALBU 2.6 02/23/2021    CALCIUM 8.3 02/23/2021    GFRAA >60 02/23/2021    LABGLOM >60 02/23/2021     Lab Results   Component Value Date    PROTIME 12.0 04/04/2019    INR 1.1 04/04/2019     No results for input(s): PROBNP in the last 72 hours. Recent Labs     02/21/21  0551   PROCAL 0.60*     This SmartLink has not been configured with any valid records. Assessment:    1. Acute bilateral pulmonary embolism  2.  Acute on chronic respiratory failure secondary to above 3. bilateral pleural effusions L>R with atelectasis/infiltrate  4. COPD with bullous emphysema  5. Non-small cell lung cancer  6. History of prostate cancer  7. History of tobacco use  8. Non-compliance with oxygen   9. Bacteremia  10. Staph hemolyticus and strep and blood cultures (likely contaminant)      Plan:   1. Follow ABGs  2. Limited code, meds only  3. Vancomycin per ID  4. Recommend Hospice  5. Pleural fluid negative for growth  6. Chronic oxygen 3L at home  7. Lovenox to Eliquis  8. cxr today    This plan of care was reviewed in collaboration with Dr. Charity Sepulveda  Electronically signed by ANGEL Peace - CNP on 2/23/2021 at 12:27 PM    I personally saw, examined, and cared for the patient. Labs, medications, radiographs reviewed. I agree with history exam and plans detailed in NP note.       Electronically signed by Kelechi Lopez DO on 2/23/2021 at 6:31 PM

## 2021-02-23 NOTE — PROGRESS NOTES
Hospitalist Progress Note      PCP: Brianna Sheth MD    Date of Admission: 2/16/2021    Chief Complaint: SOB    Hospital Course: **found to have a PE, he has a known history of non small cell lung cancer    Subjective: no complaints**       Medications:  Reviewed    Infusion Medications   Scheduled Medications    vancomycin  1,500 mg Intravenous Q18H    docusate sodium  100 mg Oral BID    polyethylene glycol  17 g Oral Daily    apixaban  10 mg Oral BID    Followed by   Aníbal Seal ON 3/1/2021] apixaban  5 mg Oral BID    bisacodyl  10 mg Rectal Q6H    sodium chloride flush  10 mL Intravenous 2 times per day    ipratropium-albuterol  1 ampule Inhalation Q4H While awake    guaiFENesin  400 mg Oral TID    sodium chloride flush  10 mL Intravenous 2 times per day    amLODIPine  5 mg Oral Daily    baclofen  20 mg Oral Nightly    vitamin D  2,000 Units Oral Daily    trospium  20 mg Oral BID AC    sodium chloride flush  10 mL Intravenous 2 times per day    budesonide  0.5 mg Nebulization BID    And    Arformoterol Tartrate  15 mcg Nebulization BID     PRN Meds: sodium chloride flush, perflutren lipid microspheres, sodium chloride flush, albuterol, sodium chloride flush, promethazine **OR** ondansetron      Intake/Output Summary (Last 24 hours) at 2/23/2021 1651  Last data filed at 2/23/2021 1413  Gross per 24 hour   Intake 780 ml   Output 1400 ml   Net -620 ml       Exam:    /65   Pulse 100   Temp 98.3 °F (36.8 °C) (Oral)   Resp 24   Ht 6' 1.5\" (1.867 m)   Wt 164 lb 11.2 oz (74.7 kg)   SpO2 (!) 89%   BMI 21.43 kg/m²           Gen: *well developed  HEENT: NC/AT, moist mucous membranes, no oropharyngeal erythema or exudate  Neck: supple, trachea midline, no anterior cervical or SC LAD  Heart:  Normal s1/s2, RRR, no murmurs, gallops, or rubs.    Lungs:  *cta * bilaterally, *  Abd: bowel sounds present, soft, nontender, nondistended, no masses  Extrem:  No clubbing, cyanosis,  *no* edema Skin: no rashes or lesions  Psych: A & O x3  Neuro: grossly intact, moves all four extremities. Capillary Refill: Brisk,< 3 seconds   Peripheral Pulses: +2 palpable, equal bilaterally               Labs:   Recent Labs     02/21/21  0551 02/22/21  0658 02/23/21  0442   WBC 4.8 4.4* 4.6   HGB 11.2* 10.9* 10.6*   HCT 38.9 37.2 35.0*   PLT 71* 70* 63*     Recent Labs     02/21/21  0551 02/22/21  0658 02/23/21  0442   * 143 138   K 4.8  4.8 4.3  4.3 3.9  3.9    101 95*   CO2 41* 40* 41*   BUN 28* 22 20   CREATININE 1.0 0.9 0.9   CALCIUM 8.7 8.3* 8.3*     Recent Labs     02/21/21  0551 02/22/21  0658 02/23/21  0442   * 157* 116*   * 272* 218*   BILITOT 0.7 0.6 0.6   ALKPHOS 78 72 74     No results for input(s): INR in the last 72 hours. No results for input(s): Geofm Squibb in the last 72 hours. Recent Labs     02/21/21  0551 02/22/21  0658 02/23/21  0442   * 157* 116*   * 272* 218*   BILITOT 0.7 0.6 0.6   ALKPHOS 78 72 74     No results for input(s): LACTA in the last 72 hours.   No results found for: Kimberley Lissette  No results found for: AMMONIA    Assessment:    Active Hospital Problems    Diagnosis Date Noted    [facility] [I26.99] 02/16/2021   non small cell lung cancer   uti   AAa   s/p thoracentesis Feb 18    Plan:  * cont norvasc   cont eliqius         DVT Prophylaxis: **eliquis  Diet: DIET GENERAL;  Code Status: Limited    PT/OT Eval Status: *done    Dispo - *ANTOLIN      Electronically signed by Dianelys Huggins DO on 2/23/2021 at 4:51 PM [place]

## 2021-02-23 NOTE — PROGRESS NOTES
Pt refusing to wear bipap tonight. Educated pt on the consequences of him not wearing the bipap such as desaturation without being able to recover his oxygen, and since pt is a no intubation and he is unable to recover his oxygen there is a possibility it could be detrimental.  Pt nodded in agreement and stated he did not want to wear bipap for long. This RN told patient he could come off bipap at 0600. Pt was agreeable to this. Will continue to educate and encourage longer use of bipap.

## 2021-02-23 NOTE — PROGRESS NOTES
GENERAL SURGERY  DAILY PROGRESS NOTE  2/23/2021    Subjective:  Patient off of BiPAP and on nasal cannula. Passing BM and flatus. No nasuea or vomiting. Objective:  BP (!) 118/59   Pulse 96   Temp 98.6 °F (37 °C) (Temporal)   Resp 20   Ht 6' 1.5\" (1.867 m)   Wt 164 lb 11.2 oz (74.7 kg)   SpO2 94%   BMI 21.43 kg/m²     GENERAL:  Laying in bed, awake, alert, cooperative, no apparent distress  HEAD: Normocephalic, atraumatic  EYES: No sclera icterus  LUNGS:  On nasal cannula  CARDIOVASCULAR:  RR  ABDOMEN:  Soft, non-tender, non-distended. Diffusely tympanic  EXTREMITIES: No edema   SKIN: Warm     Assessment/Plan:  78 y.o. male with shortness of breath, possible ileus likely resolved    -Overall care per primary  -Patient handling diet and having bowel movement/passing gas  -Bowel regimen of suppositories, glycolax and colace  -High fiber Diet  -No acute surgical intervention needed at this time  -Patient is alright for discharge from general surgery POV with bowel regimen  -Please call if there are any remaining questions    Electronically signed by Helen Arguelles DO on 2/23/2021 at 7:58 AM     The patient was seen and examined and the chart was reviewed. The patient is tolerating a high-fiber diet. The patient is having bowel function. On examination: The abdomen is soft nondistended and nontender. Assessment: Resolution of small bowel obstruction    Plan:    Please contact surgery if symptoms recur otherwise, the patient can be discharged and follow-up as an outpatient.

## 2021-02-23 NOTE — PROGRESS NOTES
[facility] PROGRESS NOTE      Chief complaint: Follow-up of Gram-positive cocci in blood cultures     The patient is a 78 y.o. male with history of COPD, non-small cell lung cancer status post radiation therapy complicated by radiation fibrosis, chronic respiratory failure on continuous 3 L/min oxygen, hypertension, presented on 02/16 with worsening shortness of breath found to have pulmonary embolism. On admission, he was afebrile and hemodynamically stable with no leukocytosis. Chest CTA showed right middle lobe and left lower lobe bilateral pulmonary embolism, advanced COPD with patchy bibasilar infiltrates and pleural effusion, 2 x 1.5 cm nodule in the lingula marginally increased and indeterminate for recurrent malignancy. CT abdomen and pelvis showed heterogenous liver concerning for liver disease, progressive abdominal aortic aneurysm measuring 5 x 4.1 cm with peripheral clot, partially distended bladder with wall thickening, slight thickening of colonic wall. Serum procalcitonin level was elevated at 3.13 ng/mL. SARS-CoV-2 PCR and urine Streptococcus pneumoniae and Legionella antigens were negative. Respiratory Gram stain and culture showed more than 25 PMNs per LPF and more than 10 epithelial cells per LPF, rare gram-positive cocci in pairs, no growth to date. Blood cultures showed gram-positive cocci in chains and Staphylococcus haemolyticus and Streptococcus sp. He underwent thoracentesis on 02/18 during which 250 mL of red-tinged pleural fluid (transudative by protein) was removed. He received a dose of piperacillin-tazobactam and azithromycin on admission. Ceftriaxone was started on 02/17. Subjective: Patient was seen and examined. No chills, no abdominal pain, no diarrhea, no rash, no itching.  On nasal canula  Objective:    Vitals:    02/23/21 1001   BP:    Pulse:    Resp: 27   Temp:    SpO2:      Constitutional: Alert, not in distress Respiratory: Clear breath sounds, no crackles, no wheezes  Cardiovascular: Regular rate and rhythm, no murmurs  Gastrointestinal: Bowel sounds present, soft, non tender, n/g  Skin: Warm and dry, no active dermatoses  Musculoskeletal: No joint swelling, no joint erythema    Laboratory:  Lab Results   Component Value Date    WBC 4.6 02/23/2021    WBC 4.4 (L) 02/22/2021    WBC 4.8 02/21/2021    HGB 10.6 (L) 02/23/2021    HCT 35.0 (L) 02/23/2021    MCV 86.6 02/23/2021    PLT 63 (L) 02/23/2021     Lab Results   Component Value Date    NEUTROABS 3.86 02/23/2021    NEUTROABS 3.11 02/22/2021    NEUTROABS 3.33 02/21/2021     Lab Results   Component Value Date    CRP 10.7 (H) 02/17/2021     No results found for: CRPHS  Lab Results   Component Value Date    SEDRATE 2 02/17/2021     Lab Results   Component Value Date     (H) 02/23/2021     (H) 02/23/2021    ALKPHOS 74 02/23/2021    BILITOT 0.6 02/23/2021     Lab Results   Component Value Date     02/23/2021    K 3.9 02/23/2021    K 3.9 02/23/2021    CL 95 02/23/2021    CO2 41 02/23/2021    BUN 20 02/23/2021    CREATININE 0.9 02/23/2021    CREATININE 0.9 02/22/2021    CREATININE 1.0 02/21/2021    GFRAA >60 02/23/2021    LABGLOM >60 02/23/2021    GLUCOSE 97 02/23/2021    PROT 6.0 02/23/2021    LABALBU 2.6 02/23/2021    CALCIUM 8.3 02/23/2021    BILITOT 0.6 02/23/2021    ALKPHOS 74 02/23/2021     02/23/2021     02/23/2021       Radiology: Chest CTA: Filling defect in the segmental and subsegmental branches of right middle   lobe and left lower lobe concerning for bilateral pulmonary embolism without   significant cardiac strain   Advanced COPD with patchy bibasilar infiltrates and pleural effusion   concerning for pneumonia and or edema.    2 x 1.5 cm nodule in the lingula which is marginally increased and   indeterminate for recurrent malignancy CT abdomen pelvis: Constipation with mild nonspecific thickening of the colonic wall. Mild thickening of the urinary bladder.  Cystitis is considered. Abdominal aortic aneurysm measuring 4.1 x 5 cm. Microbiology:     Blood cx  No results found for: BLOODCULT1  Culture, Blood 2   Date Value Ref Range Status   02/18/2021 24 Hours no growth  Preliminary   02/17/2021 5 Days no growth  Final     Organism   Date Value Ref Range Status   02/16/2021 Streptococcus species (A)  Preliminary   02/16/2021 Staphylococcus haemolyticus (A)  Preliminary       Smear, Respiratory   Date Value Ref Range Status   02/17/2021   Final    Group 3: >25 PMN's/LPF and >10 Epithelial cells/LPF  Moderate Polymorphonuclear leukocytes  Few Epithelial cells  Rare Gram positive cocci in pairs         CULTURE, RESPIRATORY   Date Value Ref Range Status   02/17/2021 Oral Pharyngeal Kin reduced  Final     BODY FLUID CULTURE  Body Fluid Culture, Sterile   Date Value Ref Range Status   02/18/2021 Growth not present  Final       URINE CULTURE  Urine Culture, Routine   Date Value Ref Range Status   02/16/2021 Growth not present  Final   05/18/2020 Growth not present  Final   02/24/2020 (A)  Final    ,000 CFU/mL  Mixed kin isolated. Further workup and sensitivity testing  is not routinely indicated and will not be performed.   Mixed kin isolated includes:  Gram negative rods  Gram positive organism     02/24/2020 >100,000 CFU/ml  Final       MRSA Culture Only   Date Value Ref Range Status   02/17/2021 Methicillin resistant Staph aureus not isolated  Final       Assessment:  Staphylococcus haemolyticus and Streptococcus sp in blood cultures, probably contaminant  Pulmonary embolism  Acute on chronic respiratory failure  Pleural effusion, status post left thoracentesis on 02/18  Micro continues to report organisms     Now staph haemolyticus, staph epidermidis, staph warneri  I cannot call these contaminantts at this juncture Also daniel zimmerman and tom mcnamara   Reviewed Ct abd   Repeat BC pending       Recommendations: Follow up blood and pleural fluid cultures. NGTD  Monitor clinically off antibiotics for now. Trend procalcitonin. -decreasing  Continue supportive care. I am going to start vanco  TTE  No evidence of vegetations  Dr Aden Cowan back tomorrow   Will defer to her decision regarding MIGNON      Thank you for involving me in the care of Stefani Danielson.  .    Electronically signed by Adebayo Wolfe MD on 2/23/2021 at 12:56 PM     .

## 2021-02-23 NOTE — PROGRESS NOTES
Occupational Therapy  OT SESSION ATTEMPT     Date:2021  Patient Name: Scar Christina  MRN: [id number]  : 1940  Room: [address] [place] [address]A     Attempted OT session this date:    [] unavailable due to other medical staff currently with pt   [] on hold per nursing staff   [] on hold per nursing staff secondary to lab / radiology results    [x] politely declined treatment this date due to SOB and eating at this time. [] off unit    [] Other:     Will reattempt OT session at a later time.       Ivelisse Cadet [address], [address]

## 2021-02-24 LAB
ALBUMIN SERPL-MCNC: 2.8 G/DL (ref 3.5–5.2)
ALP BLD-CCNC: 70 U/L (ref 40–129)
ALT SERPL-CCNC: 168 U/L (ref 0–40)
ANION GAP SERPL CALCULATED.3IONS-SCNC: 2 MMOL/L (ref 7–16)
ANISOCYTOSIS: ABNORMAL
AST SERPL-CCNC: 91 U/L (ref 0–39)
BASOPHILS ABSOLUTE: 0 E9/L (ref 0–0.2)
BASOPHILS RELATIVE PERCENT: 0.8 % (ref 0–2)
BILIRUB SERPL-MCNC: 0.7 MG/DL (ref 0–1.2)
BUN BLDV-MCNC: 15 MG/DL (ref 8–23)
CALCIUM SERPL-MCNC: 8.4 MG/DL (ref 8.6–10.2)
CHLORIDE BLD-SCNC: 96 MMOL/L (ref 98–107)
CO2: 43 MMOL/L (ref 22–29)
CREAT SERPL-MCNC: 0.9 MG/DL (ref 0.7–1.2)
EOSINOPHILS ABSOLUTE: 0.21 E9/L (ref 0.05–0.5)
EOSINOPHILS RELATIVE PERCENT: 5.2 % (ref 0–6)
GFR AFRICAN AMERICAN: >60
GFR NON-AFRICAN AMERICAN: >60 ML/MIN/1.73
GLUCOSE BLD-MCNC: 90 MG/DL (ref 74–99)
HCT VFR BLD CALC: 38.1 % (ref 37–54)
HEMOGLOBIN: 11.3 G/DL (ref 12.5–16.5)
HYPOCHROMIA: ABNORMAL
LYMPHOCYTES ABSOLUTE: 0.2 E9/L (ref 1.5–4)
LYMPHOCYTES RELATIVE PERCENT: 5.2 % (ref 20–42)
MCH RBC QN AUTO: 25.9 PG (ref 26–35)
MCHC RBC AUTO-ENTMCNC: 29.7 % (ref 32–34.5)
MCV RBC AUTO: 87.2 FL (ref 80–99.9)
METAMYELOCYTES RELATIVE PERCENT: 0.9 % (ref 0–1)
MONOCYTES ABSOLUTE: 0.56 E9/L (ref 0.1–0.95)
MONOCYTES RELATIVE PERCENT: 13.9 % (ref 2–12)
MYELOCYTE PERCENT: 0.9 % (ref 0–0)
NEUTROPHILS ABSOLUTE: 3.04 E9/L (ref 1.8–7.3)
NEUTROPHILS RELATIVE PERCENT: 73.9 % (ref 43–80)
OVALOCYTES: ABNORMAL
PDW BLD-RTO: 20.9 FL (ref 11.5–15)
PLATELET # BLD: 60 E9/L (ref 130–450)
PLATELET CONFIRMATION: NORMAL
PMV BLD AUTO: ABNORMAL FL (ref 7–12)
POIKILOCYTES: ABNORMAL
POLYCHROMASIA: ABNORMAL
POTASSIUM REFLEX MAGNESIUM: 4 MMOL/L (ref 3.5–5)
POTASSIUM SERPL-SCNC: 4 MMOL/L (ref 3.5–5)
RBC # BLD: 4.37 E12/L (ref 3.8–5.8)
SODIUM BLD-SCNC: 141 MMOL/L (ref 132–146)
TARGET CELLS: ABNORMAL
TOTAL PROTEIN: 6.5 G/DL (ref 6.4–8.3)
WBC # BLD: 4 E9/L (ref 4.5–11.5)

## 2021-02-24 PROCEDURE — 94660 CPAP INITIATION&MGMT: CPT

## 2021-02-24 PROCEDURE — 2580000003 HC RX 258: Performed by: INTERNAL MEDICINE

## 2021-02-24 PROCEDURE — 6370000000 HC RX 637 (ALT 250 FOR IP): Performed by: STUDENT IN AN ORGANIZED HEALTH CARE EDUCATION/TRAINING PROGRAM

## 2021-02-24 PROCEDURE — 6370000000 HC RX 637 (ALT 250 FOR IP): Performed by: INTERNAL MEDICINE

## 2021-02-24 PROCEDURE — 2700000000 HC OXYGEN THERAPY PER DAY

## 2021-02-24 PROCEDURE — 6360000002 HC RX W HCPCS: Performed by: FAMILY MEDICINE

## 2021-02-24 PROCEDURE — 80053 COMPREHEN METABOLIC PANEL: CPT

## 2021-02-24 PROCEDURE — 85025 COMPLETE CBC W/AUTO DIFF WBC: CPT

## 2021-02-24 PROCEDURE — 99233 SBSQ HOSP IP/OBS HIGH 50: CPT | Performed by: FAMILY MEDICINE

## 2021-02-24 PROCEDURE — 6370000000 HC RX 637 (ALT 250 FOR IP): Performed by: FAMILY MEDICINE

## 2021-02-24 PROCEDURE — 36415 COLL VENOUS BLD VENIPUNCTURE: CPT

## 2021-02-24 PROCEDURE — 94640 AIRWAY INHALATION TREATMENT: CPT

## 2021-02-24 PROCEDURE — 6360000002 HC RX W HCPCS: Performed by: INTERNAL MEDICINE

## 2021-02-24 PROCEDURE — 2580000003 HC RX 258: Performed by: FAMILY MEDICINE

## 2021-02-24 PROCEDURE — 2060000000 HC ICU INTERMEDIATE R&B

## 2021-02-24 RX ORDER — MORPHINE SULFATE 20 MG/ML
5 SOLUTION ORAL EVERY 4 HOURS PRN
Status: DISCONTINUED | OUTPATIENT
Start: 2021-02-24 | End: 2021-02-26 | Stop reason: HOSPADM

## 2021-02-24 RX ADMIN — VANCOMYCIN HYDROCHLORIDE 1500 MG: 10 INJECTION, POWDER, LYOPHILIZED, FOR SOLUTION INTRAVENOUS at 02:29

## 2021-02-24 RX ADMIN — APIXABAN 10 MG: 5 TABLET, FILM COATED ORAL at 09:27

## 2021-02-24 RX ADMIN — GUAIFENESIN 400 MG: 400 TABLET ORAL at 15:36

## 2021-02-24 RX ADMIN — DOCUSATE SODIUM 100 MG: 100 CAPSULE, LIQUID FILLED ORAL at 09:26

## 2021-02-24 RX ADMIN — BISACODYL 10 MG: 10 SUPPOSITORY RECTAL at 09:27

## 2021-02-24 RX ADMIN — BUDESONIDE 500 MCG: 0.5 SUSPENSION RESPIRATORY (INHALATION) at 08:21

## 2021-02-24 RX ADMIN — DOCUSATE SODIUM 100 MG: 100 CAPSULE, LIQUID FILLED ORAL at 21:01

## 2021-02-24 RX ADMIN — IPRATROPIUM BROMIDE AND ALBUTEROL SULFATE 1 AMPULE: 2.5; .5 SOLUTION RESPIRATORY (INHALATION) at 08:22

## 2021-02-24 RX ADMIN — APIXABAN 10 MG: 5 TABLET, FILM COATED ORAL at 21:01

## 2021-02-24 RX ADMIN — TROSPIUM CHLORIDE 20 MG: 20 TABLET, FILM COATED ORAL at 15:36

## 2021-02-24 RX ADMIN — IPRATROPIUM BROMIDE AND ALBUTEROL SULFATE 1 AMPULE: 2.5; .5 SOLUTION RESPIRATORY (INHALATION) at 11:31

## 2021-02-24 RX ADMIN — IPRATROPIUM BROMIDE AND ALBUTEROL SULFATE 1 AMPULE: 2.5; .5 SOLUTION RESPIRATORY (INHALATION) at 20:26

## 2021-02-24 RX ADMIN — BISACODYL 10 MG: 10 SUPPOSITORY RECTAL at 02:29

## 2021-02-24 RX ADMIN — ARFORMOTEROL TARTRATE 15 MCG: 15 SOLUTION RESPIRATORY (INHALATION) at 20:26

## 2021-02-24 RX ADMIN — BACLOFEN 20 MG: 10 TABLET ORAL at 21:01

## 2021-02-24 RX ADMIN — ARFORMOTEROL TARTRATE 15 MCG: 15 SOLUTION RESPIRATORY (INHALATION) at 08:21

## 2021-02-24 RX ADMIN — BUDESONIDE 500 MCG: 0.5 SUSPENSION RESPIRATORY (INHALATION) at 20:26

## 2021-02-24 RX ADMIN — IPRATROPIUM BROMIDE AND ALBUTEROL SULFATE 1 AMPULE: 2.5; .5 SOLUTION RESPIRATORY (INHALATION) at 16:53

## 2021-02-24 RX ADMIN — POLYETHYLENE GLYCOL 3350 17 G: 17 POWDER, FOR SOLUTION ORAL at 09:25

## 2021-02-24 RX ADMIN — GUAIFENESIN 400 MG: 400 TABLET ORAL at 21:01

## 2021-02-24 RX ADMIN — TROSPIUM CHLORIDE 20 MG: 20 TABLET, FILM COATED ORAL at 09:26

## 2021-02-24 RX ADMIN — Medication 10 ML: at 09:29

## 2021-02-24 RX ADMIN — Medication 10 ML: at 09:27

## 2021-02-24 RX ADMIN — Medication 2000 UNITS: at 09:26

## 2021-02-24 RX ADMIN — GUAIFENESIN 400 MG: 400 TABLET ORAL at 09:26

## 2021-02-24 RX ADMIN — AMLODIPINE BESYLATE 5 MG: 5 TABLET ORAL at 09:26

## 2021-02-24 ASSESSMENT — PAIN SCALES - GENERAL: PAINLEVEL_OUTOF10: 0

## 2021-02-24 NOTE — PROGRESS NOTES
[facility] PROGRESS NOTE      Chief complaint: Follow-up of Gram-positive cocci in blood cultures     The patient is a 78 y.o. male with history of COPD, non-small cell lung cancer status post radiation therapy complicated by radiation fibrosis, chronic respiratory failure on continuous 3 L/min oxygen, hypertension, presented on 02/16 with worsening shortness of breath found to have pulmonary embolism. On admission, he was afebrile and hemodynamically stable with no leukocytosis. Chest CTA showed right middle lobe and left lower lobe bilateral pulmonary embolism, advanced COPD with patchy bibasilar infiltrates and pleural effusion, 2 x 1.5 cm nodule in the lingula marginally increased and indeterminate for recurrent malignancy. CT abdomen and pelvis showed heterogenous liver concerning for liver disease, progressive abdominal aortic aneurysm measuring 5 x 4.1 cm with peripheral clot, partially distended bladder with wall thickening, slight thickening of colonic wall. Serum procalcitonin level was elevated at 3.13 ng/mL. SARS-CoV-2 PCR and urine Streptococcus pneumoniae and Legionella antigens were negative. Respiratory Gram stain and culture showed more than 25 PMNs per LPF and more than 10 epithelial cells per LPF, rare gram-positive cocci in pairs, no growth to date. Blood cultures showed gram-positive cocci in chains and Staphylococcus haemolyticus in 1 set and Streptococcus viridans group, Staphylococcus epidermidis and warneri, Salud Alysha in the other set. Repeat blood cultures from 02/17 and 02/18 showed no growth. He underwent thoracentesis on 02/18 during which 250 mL of red-tinged pleural fluid (transudative by protein) was removed. Pleural fluid culture showed no growth. He received a dose of piperacillin-tazobactam and azithromycin on admission. Ceftriaxone was started on 02/17. Subjective: Patient was seen and examined. No chills, no abdominal pain, no diarrhea, no rash, no itching. Objective:    Vitals:    02/24/21 1410   BP: 135/78   Pulse: 95   Resp: (!) 32   Temp: 98.3 °F (36.8 °C)   SpO2: 94%     Constitutional: Alert, not in distress  Respiratory: Clear breath sounds, no crackles, no wheezes  Cardiovascular: Regular rate and rhythm, no murmurs  Gastrointestinal: Bowel sounds present, soft, nontender  Skin: Warm and dry, no active dermatoses  Musculoskeletal: No joint swelling, no joint erythema    Labs, imaging, and records were personally reviewed. Assessment:  CoNS (Staphylococcus haemolyticus, warneri) and Streptococcus viridans in blood cultures, probably contaminant. Multiple organisms and no consistent pathogen recovered from blood cultures with subsequent repeat blood cultures showing no growth, likely indicative of contamination. Pulmonary embolism  Acute on chronic respiratory failure  Pleural effusion, status post left thoracentesis on 02/18    Recommendations:  Discontinue vancomycin. No antibiotic therapy indicated for now. Monitor clinically off antibiotics for now. Follow up goals of care.     Thank you for involving me in the care of Giulia Plunkett. I will sign off for now. Please do not hesitate to call for any questions, concerns, or new findings.     Electronically signed by Zachary Higginbotham MD on 2/24/2021 at 11:04 AM

## 2021-02-24 NOTE — PROGRESS NOTES
Hospitalist Progress Note      PCP: Brianna Sheth MD    Date of Admission: 2/16/2021    Chief Complaint: *SOB     Hospital Course: **found to have a PE, he has a known history of non small cell lung cancer   *vanc dc'd      Subjective: * no complaints**       Medications:  Reviewed    Infusion Medications   Scheduled Medications    docusate sodium  100 mg Oral BID    polyethylene glycol  17 g Oral Daily    apixaban  10 mg Oral BID    Followed by   Aníbal Seal ON 3/1/2021] apixaban  5 mg Oral BID    bisacodyl  10 mg Rectal Q6H    sodium chloride flush  10 mL Intravenous 2 times per day    ipratropium-albuterol  1 ampule Inhalation Q4H While awake    guaiFENesin  400 mg Oral TID    sodium chloride flush  10 mL Intravenous 2 times per day    amLODIPine  5 mg Oral Daily    baclofen  20 mg Oral Nightly    vitamin D  2,000 Units Oral Daily    trospium  20 mg Oral BID AC    sodium chloride flush  10 mL Intravenous 2 times per day    budesonide  0.5 mg Nebulization BID    And    Arformoterol Tartrate  15 mcg Nebulization BID     PRN Meds: morphine 20MG/ML, sodium chloride flush, perflutren lipid microspheres, sodium chloride flush, albuterol, sodium chloride flush, promethazine **OR** ondansetron      Intake/Output Summary (Last 24 hours) at 2/24/2021 1531  Last data filed at 2/24/2021 1447  Gross per 24 hour   Intake 650 ml   Output 1700 ml   Net -1050 ml       Exam:    /78   Pulse 95   Temp 98.3 °F (36.8 °C) (Temporal)   Resp (!) 32   Ht 6' 1.5\" (1.867 m)   Wt 161 lb 14.4 oz (73.4 kg)   SpO2 94%   BMI 21.07 kg/m²       Gen: *well developed  HEENT: NC/AT, moist mucous membranes,   Neck: supple, trachea midline,   Heart:  Normal s1/s2, RRR, no murmurs, gallops, or rubs.    Lungs:  *cta * bilaterally, *  Abd: bowel sounds present, soft, nontender, nondistended, no masses  Extrem:  No clubbing, cyanosis,  *no* edema  Skin: no rashes or lesions  Psych: A & O x3 Neuro: grossly intact, moves all four extremities. Capillary Refill: Brisk,< 3 seconds   Peripheral Pulses: +2 palpable, equal bilaterally              Labs:   Recent Labs     02/22/21  0658 02/23/21  0442 02/24/21  0601   WBC 4.4* 4.6 4.0*   HGB 10.9* 10.6* 11.3*   HCT 37.2 35.0* 38.1   PLT 70* 63* 60*     Recent Labs     02/22/21  0658 02/23/21  0442 02/24/21  0601    138 141   K 4.3  4.3 3.9  3.9 4.0  4.0    95* 96*   CO2 40* 41* 43*   BUN 22 20 15   CREATININE 0.9 0.9 0.9   CALCIUM 8.3* 8.3* 8.4*     Recent Labs     02/22/21  0658 02/23/21  0442 02/24/21  0601   * 116* 91*   * 218* 168*   BILITOT 0.6 0.6 0.7   ALKPHOS 72 74 70     No results for input(s): INR in the last 72 hours. No results for input(s): Branch Humza in the last 72 hours. Recent Labs     02/22/21  0658 02/23/21  0442 02/24/21  0601   * 116* 91*   * 218* 168*   BILITOT 0.6 0.6 0.7   ALKPHOS 72 74 70     No results for input(s): LACTA in the last 72 hours.   No results found for: Sarthak Mathis  No results found for: AMMONIA    Assessment:    Active Hospital Problems    Diagnosis Date Noted    Pulmonary embolism ([facility] [address] [I26.99] 02/16/2021   non small cell lung cancer   uti   AAa   s/p thoracentesis Feb 18     Plan:  * cont norvasc   cont eliqius           DVT Prophylaxis: **eliquis  Diet: DIET GENERAL;  Code Status: Limited     PT/OT Eval Status: *done     Dispo - *ANTOLIN      Electronically signed by Joey Parish DO on 2/24/2021 at 3:31 PM [facility]

## 2021-02-24 NOTE — PROGRESS NOTES
Kapil Velasquez M.D.,[facility]  Zoltan Atkins D.O., F.A.C.O.I., Lillian Dugan M.D. Sharrie Cowden, M.D., Jeaneth Almaguer M.D. Anika Luz D.O. Daily Pulmonary Progress Note    Patient:  Manny Santos 78 y.o. male MRN: [id number]     Date of Service: 2/24/2021      Synopsis     We are following patient for acute bilateral pulmonary embolism, acute on chronic respiratory failure, bilateral pleural effusions    \"CC\" shortness of breath    Code status: Limited      Subjective      Patient was seen and examined, today he is on 15L oxygen. Palliative care has been consulted. Hospice is being considered. Review of Systems:  Constitutional: Denies fever, weight loss, night sweats, and fatigue  Skin: Denies pigmentation, dark lesions, and rashes   HEENT: Denies hearing loss, tinnitus, ear drainage, epistaxis, sore throat, and hoarseness. Cardiovascular: Denies palpitations, chest pain, and chest pressure. Respiratory: Denies cough, dyspnea at rest, hemoptysis, apnea, and choking.   Gastrointestinal: Denies nausea, vomiting, poor appetite, diarrhea, heartburn or reflux  Genitourinary: Denies dysuria, frequency, urgency or hematuria  Musculoskeletal: Denies myalgias, muscle weakness, and bone pain  Neurological: Denies dizziness, vertigo, headache, and focal weakness  Psychological: Denies anxiety and depression  Endocrine: Denies heat intolerance and cold intolerance  Hematopoietic/Lymphatic: Denies bleeding problems and blood transfusions    24-hour events:  none    Objective   Vitals: BP (!) 145/86   Pulse 91   Temp 97.3 °F (36.3 °C) (Core)   Resp 18   Ht 6' 1.5\" (1.867 m)   Wt 161 lb 14.4 oz (73.4 kg)   SpO2 93%   BMI 21.07 kg/m²     I/O:    Intake/Output Summary (Last 24 hours) at 2/24/2021 1242  Last data filed at 2/24/2021 0553  Gross per 24 hour   Intake 590 ml   Output 1650 ml   Net -1060 ml       Vent Information  Skin Assessment: Clean, dry, & intact Vt Ordered: 550 mL  FiO2 : 60 %  SpO2: 93 %  SpO2/FiO2 ratio: 156.67  I Time/ I Time %: 0.9 s  Mask Type: Full face mask  Mask Size: Small          CPAP/EPAP: 8 cmH2O     CURRENT MEDS :  Scheduled Meds:   docusate sodium  100 mg Oral BID    polyethylene glycol  17 g Oral Daily    apixaban  10 mg Oral BID    Followed by   Shayna Locus ON 3/1/2021] apixaban  5 mg Oral BID    bisacodyl  10 mg Rectal Q6H    sodium chloride flush  10 mL Intravenous 2 times per day    ipratropium-albuterol  1 ampule Inhalation Q4H While awake    guaiFENesin  400 mg Oral TID    sodium chloride flush  10 mL Intravenous 2 times per day    amLODIPine  5 mg Oral Daily    baclofen  20 mg Oral Nightly    vitamin D  2,000 Units Oral Daily    trospium  20 mg Oral BID AC    sodium chloride flush  10 mL Intravenous 2 times per day    budesonide  0.5 mg Nebulization BID    And    Arformoterol Tartrate  15 mcg Nebulization BID       Physical Exam:  General Appearance: appears comfortable in no acute distress. HEENT: Normocephalic atraumatic without obvious abnormality   Neck: Lips, mucosa, and tongue normal.  Supple, symmetrical, trachea midline, no adenopathy;thyroid:  no enlargement/tenderness/nodules or JVD. Lung: Breath sounds diminished with scattered rhonchi. Respirations   unlabored. Symmetrical expansion. Heart: RRR, normal S1, S2. No MRG  Abdomen: Soft, NT, ND. BS present x 4 quadrants. No bruit or organomegaly. Extremities: Pedal pulses 2+ symmetric b/l. Extremities normal, no cyanosis, clubbing, or edema. Musculokeletal: No joint swelling, no muscle tenderness. ROM normal in all joints of extremities. Neurologic: Mental status: Alert and Oriented X3 . Pertinent/ New Labs and Imaging Studies     Imaging Personally Reviewed:  2/19/2021  Coarse interstitial markings in the lungs.  There are bilateral lower lung   opacities and probable pleural effusions. Atherosclerotic disease in the thoracic aorta. Air-filled bowel loops in the upper abdomen. Enteric tube extends subdiaphragmatic with distal tip and side port   projecting over the left upper quadrant.  The distal tip projects towards the   upper and lateral aspect of the left upper quadrant. Worsening of bilateral lower lung airspace opacities.  Finding may represent   increase in pleural effusion/compressive atelectasis. ECHO  2/20  Summary   Left ventricle size is normal.   Normal left ventricular wall thickness. Overall ejection fraction is low normal .   No regional wall motion abnormalities seen. E/A flow reversal noted. Suggestive of diastolic dysfunction. Right ventricle global systolic function is mildly reduced . Right ventricular septum flattened in systole consistent with RV pressure   overload. Physiologic and/or trace mitral regurgitation is present. Moderate tricuspid regurgitation. RVSP is 61 mmHg. Pulmonary hypertension is moderate . cxr 2/23  Interval improvement of bibasilar infiltrates slightly worse on the left. Labs:  Lab Results   Component Value Date    WBC 4.0 02/24/2021    HGB 11.3 02/24/2021    HCT 38.1 02/24/2021    MCV 87.2 02/24/2021    MCH 25.9 02/24/2021    MCHC 29.7 02/24/2021    RDW 20.9 02/24/2021    PLT 60 02/24/2021    MPV NOT CALC 02/24/2021     Lab Results   Component Value Date     02/24/2021    K 4.0 02/24/2021    K 4.0 02/24/2021    CL 96 02/24/2021    CO2 43 02/24/2021    BUN 15 02/24/2021    CREATININE 0.9 02/24/2021    LABALBU 2.8 02/24/2021    CALCIUM 8.4 02/24/2021    GFRAA >60 02/24/2021    LABGLOM >60 02/24/2021     Lab Results   Component Value Date    PROTIME 12.0 04/04/2019    INR 1.1 04/04/2019     No results for input(s): PROBNP in the last 72 hours. No results for input(s): PROCAL in the last 72 hours. This SmartLink has not been configured with any valid records. Assessment:    1.  Acute bilateral pulmonary embolism 2. Acute on chronic respiratory failure secondary to above  3. bilateral pleural effusions L>R with atelectasis/infiltrate  4. COPD with bullous emphysema  5. Non-small cell lung cancer  6. History of prostate cancer  7. History of tobacco use  8. Non-compliance with oxygen   9. Bacteremia  10. Staph hemolyticus and strep and blood cultures (likely contaminant)      Plan:   1. Follow ABGs  2. Limited code, meds only  3. Vancomycin per ID  4. Recommend Hospice, being considered by family  5. Pleural fluid negative for growth  6. Chronic oxygen 3L at home, currently on 15L  7. Eliquis for anticoagulation  8. Cxr, see above    This plan of care was reviewed in collaboration with Dr. Ancelmo Thomas  Electronically signed by ANGEL Olmedo CNP on 2/24/2021 at 12:42 PM    I personally saw, examined, and cared for the patient. Labs, medications, radiographs reviewed. I agree with history exam and plans detailed in NP note.       Electronically signed by Remonia Phalen, DO on 2/24/2021 at 4:46 PM

## 2021-02-24 NOTE — PROGRESS NOTES
Palliative Care Department  [phone]  Palliative Care Progress Note  Provider [address] [facility]  [id number]  Hospital Day: 9  Date of Initial Consult: 2/20/2021  Referring Provider: Daisy Dominguez MD  Palliative Medicine was consulted for assistance with: goals of care, code status    HPI:   Wendi Yip is a 78 y.o. with a medical history of NSCLC s/p radiation, HTN, COPD on 3L at home who was admitted on 2/16/2021 from home with a CHIEF COMPLAINT of altered mental status, SOB. ASSESSMENT/PLAN:     Pertinent Hospital Diagnoses     ? NSCLC  ? Bilateral pulmonary emboli  ? Community acquired pneumonia  ? UTI  ? Lactic acidosis  ? Acute on chronic hypoxic respiratory failure  ? CKD stage 3      Palliative Care Encounter / Counseling Regarding Goals of Care  Please see detailed goals of care discussion as below  ? At this time, Wendi Yip, Does have capacity for medical decision-making. Capacity is time limited and situation/question specific  ? During encounter sister was assistant medical decision-maker  ? Outcome of goals of care meeting: patient wants hospice, can't afford private pay and doesn't have help at home (lives alone), so wants skilled  ? Code status Limited no to everything but medication  ? Advanced Directives: no POA or living will in [facility]  ?  Surrogate/Legal NOK:  Desi Barrientos (sister) [phone]      Dyspnea:   -  Secondary to NSCLC   -  Will start prn oral morphine for dyspnea 5mg q4hrs    Spiritual assessment: no spiritual distress identified  Bereavement and grief: to be determined  Referrals to: none today  SUBJECTIVE:     Current medical issues leading to Palliative Medicine involvement include   Active Hospital Problems    Diagnosis Date Noted    Pulmonary embolism ([facility] [address] [I26.99] 02/16/2021 Details of Conversation:  Introduced self and PM to patient and his sister Ruben Bustillos who was at bedside. Ruebn Bustillos states that together she and patient have decided he is ready for hospice. They want hospice at a facility because Ruben Bustillos is unable to take care of the patient at home. Discussed that there is private pay associated with room and board at facility with hospice. They were not aware of this and thought insurance covered it 100%. They are unable to afford that and ask about just going to facility to live, discussed that is not 100% covered by insurance. Ruben Bustillos asks about going to facility for rehab, discussed that PT/OT have to evaluate patient and he has to qualify, and might be covered if insurance approves. Ruben Bustillos and patient state that is what they want at this time. Let CM know. She will go talk with patient and sister and make a plan.     Physical Function:  PPS: 30    OBJECTIVE:   Prognosis: Poor    Physical Exam:  BP (!) 145/86   Pulse 91   Temp 97.3 °F (36.3 °C) (Core)   Resp 18   Ht 6' 1.5\" (1.867 m)   Wt 161 lb 14.4 oz (73.4 kg)   SpO2 93%   BMI 21.07 kg/m²   Constitutional:  Elderly, thin, NAD, awake, alert  Eyes: no scleral icterus, normal lids, no discharge  ENMT:  Normocephalic, atraumatic, mucosa moist, EOMI  Neck:  trachea midline, no JVD  Lungs: Dyspneic, increased work of breathing, tachypneic, rhonchi scattered  Heart[de-identified]  RRR, distant heart tones, no murmur, rub, or gallop noted during exam  Abd:  Soft, non tender, non distended, bowel sounds present  :  deferred  MSK: sarcopenia present  Ext:  Moving all extremities, no edema, pulses present  Skin:  Warm and dry, no rashes on visible skin  Psych: non-anxious affect  Neuro:  PERRL, Alert, grossly nonfocal; following commands    Objective data reviewed: labs, images, records, medication use, vitals and chart Discussed patient and the plan of care with the other IDT members: Palliative Medicine IDT Team, Floor Nurse, Patient and Family    Time/Communication  Greater than 50% of time spent, total 35 minutes in counseling and coordination of care at the bedside regarding goals of care, symptom management, diagnosis and prognosis and see above. Thank you for allowing Palliative Medicine to participate in the care of Pérez Yo.

## 2021-02-24 NOTE — CARE COORDINATION
Spoke to sister at bedside, she is asking to speak to Palliative regarding goals of care and possibly Hospice. Referral placed, perfect serve message sent. Flor Hernandez covering. Will await full consult and speak to sister regarding discharge plan    Yaquelin REZA, RN  [facility] Case Management  [phone]--Spoke to sister at length and she would like pt to go to a [facility] and transition to Medicaid. No preference on facility, she is aware with him being on 15 liters O2 that [facility] may be our only option. She is agreeable to referral. Call placed to [facility]. She will review and call me back.  Will update sister after decision made

## 2021-02-24 NOTE — PLAN OF CARE
Problem: Falls - Risk of:  Goal: Will remain free from falls  Description: Will remain free from falls  Outcome: Met This Shift  Goal: Absence of physical injury  Description: Absence of physical injury  Outcome: Met This Shift     Problem: Breathing Pattern - Ineffective:  Goal: Ability to achieve and maintain a regular respiratory rate will improve  Description: Ability to achieve and maintain a regular respiratory rate will improve  Outcome: Not Met This Shift     Problem: Gas Exchange - Impaired:  Goal: Levels of oxygenation will improve  Description: Levels of oxygenation will improve  Outcome: Not Met This Shift

## 2021-02-25 ENCOUNTER — APPOINTMENT (OUTPATIENT)
Dept: GENERAL RADIOLOGY | Age: 81
DRG: 175 | End: 2021-02-25
Payer: MEDICARE

## 2021-02-25 LAB
ALBUMIN SERPL-MCNC: 2.7 G/DL (ref 3.5–5.2)
ALP BLD-CCNC: 73 U/L (ref 40–129)
ALT SERPL-CCNC: 137 U/L (ref 0–40)
ANION GAP SERPL CALCULATED.3IONS-SCNC: 0 MMOL/L (ref 7–16)
ANISOCYTOSIS: ABNORMAL
AST SERPL-CCNC: 81 U/L (ref 0–39)
BASOPHILIC STIPPLING: ABNORMAL
BASOPHILS ABSOLUTE: 0 E9/L (ref 0–0.2)
BASOPHILS RELATIVE PERCENT: 1 % (ref 0–2)
BILIRUB SERPL-MCNC: 0.7 MG/DL (ref 0–1.2)
BUN BLDV-MCNC: 13 MG/DL (ref 8–23)
CALCIUM SERPL-MCNC: 8.6 MG/DL (ref 8.6–10.2)
CHLORIDE BLD-SCNC: 95 MMOL/L (ref 98–107)
CO2: 44 MMOL/L (ref 22–29)
CREAT SERPL-MCNC: 0.8 MG/DL (ref 0.7–1.2)
EOSINOPHILS ABSOLUTE: 0.09 E9/L (ref 0.05–0.5)
EOSINOPHILS RELATIVE PERCENT: 1.7 % (ref 0–6)
GFR AFRICAN AMERICAN: >60
GFR NON-AFRICAN AMERICAN: >60 ML/MIN/1.73
GLUCOSE BLD-MCNC: 97 MG/DL (ref 74–99)
HCT VFR BLD CALC: 39.4 % (ref 37–54)
HEMOGLOBIN: 11.5 G/DL (ref 12.5–16.5)
HYPOCHROMIA: ABNORMAL
LYMPHOCYTES ABSOLUTE: 0.2 E9/L (ref 1.5–4)
LYMPHOCYTES RELATIVE PERCENT: 3.5 % (ref 20–42)
MCH RBC QN AUTO: 26.2 PG (ref 26–35)
MCHC RBC AUTO-ENTMCNC: 29.2 % (ref 32–34.5)
MCV RBC AUTO: 89.7 FL (ref 80–99.9)
METAMYELOCYTES RELATIVE PERCENT: 0.9 % (ref 0–1)
MONOCYTES ABSOLUTE: 0.85 E9/L (ref 0.1–0.95)
MONOCYTES RELATIVE PERCENT: 16.5 % (ref 2–12)
NEUTROPHILS ABSOLUTE: 3.9 E9/L (ref 1.8–7.3)
NEUTROPHILS RELATIVE PERCENT: 77.4 % (ref 43–80)
OVALOCYTES: ABNORMAL
PDW BLD-RTO: 20.8 FL (ref 11.5–15)
PLATELET # BLD: 52 E9/L (ref 130–450)
PLATELET CONFIRMATION: NORMAL
PMV BLD AUTO: ABNORMAL FL (ref 7–12)
POIKILOCYTES: ABNORMAL
POLYCHROMASIA: ABNORMAL
POTASSIUM REFLEX MAGNESIUM: 4.5 MMOL/L (ref 3.5–5)
POTASSIUM SERPL-SCNC: 4.5 MMOL/L (ref 3.5–5)
RBC # BLD: 4.39 E12/L (ref 3.8–5.8)
SARS-COV-2, NAAT: NOT DETECTED
SODIUM BLD-SCNC: 139 MMOL/L (ref 132–146)
TARGET CELLS: ABNORMAL
TOTAL PROTEIN: 6.4 G/DL (ref 6.4–8.3)
WBC # BLD: 5 E9/L (ref 4.5–11.5)

## 2021-02-25 PROCEDURE — 36415 COLL VENOUS BLD VENIPUNCTURE: CPT

## 2021-02-25 PROCEDURE — 94640 AIRWAY INHALATION TREATMENT: CPT

## 2021-02-25 PROCEDURE — 71045 X-RAY EXAM CHEST 1 VIEW: CPT

## 2021-02-25 PROCEDURE — 97530 THERAPEUTIC ACTIVITIES: CPT | Performed by: PHYSICAL THERAPIST

## 2021-02-25 PROCEDURE — 85025 COMPLETE CBC W/AUTO DIFF WBC: CPT

## 2021-02-25 PROCEDURE — 97530 THERAPEUTIC ACTIVITIES: CPT

## 2021-02-25 PROCEDURE — 6370000000 HC RX 637 (ALT 250 FOR IP): Performed by: INTERNAL MEDICINE

## 2021-02-25 PROCEDURE — 6370000000 HC RX 637 (ALT 250 FOR IP): Performed by: STUDENT IN AN ORGANIZED HEALTH CARE EDUCATION/TRAINING PROGRAM

## 2021-02-25 PROCEDURE — 97535 SELF CARE MNGMENT TRAINING: CPT

## 2021-02-25 PROCEDURE — 99232 SBSQ HOSP IP/OBS MODERATE 35: CPT | Performed by: FAMILY MEDICINE

## 2021-02-25 PROCEDURE — 6360000002 HC RX W HCPCS: Performed by: FAMILY MEDICINE

## 2021-02-25 PROCEDURE — 80053 COMPREHEN METABOLIC PANEL: CPT

## 2021-02-25 PROCEDURE — 2060000000 HC ICU INTERMEDIATE R&B

## 2021-02-25 PROCEDURE — 87635 SARS-COV-2 COVID-19 AMP PRB: CPT

## 2021-02-25 PROCEDURE — 6370000000 HC RX 637 (ALT 250 FOR IP): Performed by: FAMILY MEDICINE

## 2021-02-25 PROCEDURE — 2580000003 HC RX 258: Performed by: FAMILY MEDICINE

## 2021-02-25 PROCEDURE — 2580000003 HC RX 258: Performed by: INTERNAL MEDICINE

## 2021-02-25 PROCEDURE — 94660 CPAP INITIATION&MGMT: CPT

## 2021-02-25 PROCEDURE — 2700000000 HC OXYGEN THERAPY PER DAY

## 2021-02-25 RX ORDER — LACTULOSE 10 G/15ML
20 SOLUTION ORAL 2 TIMES DAILY
Status: DISCONTINUED | OUTPATIENT
Start: 2021-02-25 | End: 2021-02-26 | Stop reason: HOSPADM

## 2021-02-25 RX ORDER — TAMSULOSIN HYDROCHLORIDE 0.4 MG/1
0.4 CAPSULE ORAL DAILY
Qty: 30 CAPSULE | Refills: 0 | DISCHARGE
Start: 2021-02-25

## 2021-02-25 RX ORDER — MORPHINE SULFATE 20 MG/ML
5 SOLUTION ORAL EVERY 4 HOURS PRN
Qty: 5 ML | Refills: 0 | Status: SHIPPED | OUTPATIENT
Start: 2021-02-25 | End: 2021-02-28

## 2021-02-25 RX ADMIN — AMLODIPINE BESYLATE 5 MG: 5 TABLET ORAL at 09:50

## 2021-02-25 RX ADMIN — Medication 10 ML: at 20:07

## 2021-02-25 RX ADMIN — IPRATROPIUM BROMIDE AND ALBUTEROL SULFATE 1 AMPULE: 2.5; .5 SOLUTION RESPIRATORY (INHALATION) at 20:25

## 2021-02-25 RX ADMIN — Medication 10 ML: at 09:50

## 2021-02-25 RX ADMIN — BACLOFEN 20 MG: 10 TABLET ORAL at 20:06

## 2021-02-25 RX ADMIN — Medication 2000 UNITS: at 09:47

## 2021-02-25 RX ADMIN — TROSPIUM CHLORIDE 20 MG: 20 TABLET, FILM COATED ORAL at 05:46

## 2021-02-25 RX ADMIN — BUDESONIDE 500 MCG: 0.5 SUSPENSION RESPIRATORY (INHALATION) at 20:25

## 2021-02-25 RX ADMIN — DOCUSATE SODIUM 100 MG: 100 CAPSULE, LIQUID FILLED ORAL at 09:52

## 2021-02-25 RX ADMIN — ARFORMOTEROL TARTRATE 15 MCG: 15 SOLUTION RESPIRATORY (INHALATION) at 08:23

## 2021-02-25 RX ADMIN — Medication 10 ML: at 20:08

## 2021-02-25 RX ADMIN — ARFORMOTEROL TARTRATE 15 MCG: 15 SOLUTION RESPIRATORY (INHALATION) at 20:25

## 2021-02-25 RX ADMIN — TROSPIUM CHLORIDE 20 MG: 20 TABLET, FILM COATED ORAL at 16:06

## 2021-02-25 RX ADMIN — APIXABAN 10 MG: 5 TABLET, FILM COATED ORAL at 09:49

## 2021-02-25 RX ADMIN — LACTULOSE 20 G: 20 SOLUTION ORAL at 14:11

## 2021-02-25 RX ADMIN — GUAIFENESIN 400 MG: 400 TABLET ORAL at 09:52

## 2021-02-25 RX ADMIN — IPRATROPIUM BROMIDE AND ALBUTEROL SULFATE 1 AMPULE: 2.5; .5 SOLUTION RESPIRATORY (INHALATION) at 08:24

## 2021-02-25 RX ADMIN — POLYETHYLENE GLYCOL 3350 17 G: 17 POWDER, FOR SOLUTION ORAL at 09:47

## 2021-02-25 RX ADMIN — IPRATROPIUM BROMIDE AND ALBUTEROL SULFATE 1 AMPULE: 2.5; .5 SOLUTION RESPIRATORY (INHALATION) at 17:02

## 2021-02-25 RX ADMIN — Medication 10 ML: at 09:47

## 2021-02-25 RX ADMIN — Medication 10 ML: at 09:51

## 2021-02-25 RX ADMIN — LACTULOSE 20 G: 20 SOLUTION ORAL at 20:07

## 2021-02-25 RX ADMIN — BUDESONIDE 500 MCG: 0.5 SUSPENSION RESPIRATORY (INHALATION) at 08:23

## 2021-02-25 RX ADMIN — GUAIFENESIN 400 MG: 400 TABLET ORAL at 14:09

## 2021-02-25 RX ADMIN — APIXABAN 10 MG: 5 TABLET, FILM COATED ORAL at 20:07

## 2021-02-25 RX ADMIN — GUAIFENESIN 400 MG: 400 TABLET ORAL at 20:06

## 2021-02-25 RX ADMIN — DOCUSATE SODIUM 100 MG: 100 CAPSULE, LIQUID FILLED ORAL at 20:07

## 2021-02-25 RX ADMIN — IPRATROPIUM BROMIDE AND ALBUTEROL SULFATE 1 AMPULE: 2.5; .5 SOLUTION RESPIRATORY (INHALATION) at 12:41

## 2021-02-25 ASSESSMENT — PAIN SCALES - GENERAL
PAINLEVEL_OUTOF10: 0
PAINLEVEL_OUTOF10: 0

## 2021-02-25 NOTE — CARE COORDINATION
[facility] has accepted. They can accept on Optiflow with AVAPS at bedtime. All settings given to Berniece Gowers. Discharge order placed. Transport set up with [facility] @ 9pm. They are going to try to outsource trip and will call floor with updated time. Covid 2/25 (-). Hens and ambulance form on soft chart. Facility notified. Call placed to [facility] to update.     Angie GOELN, RN  [facility] Case Management  [phone]

## 2021-02-25 NOTE — PROGRESS NOTES
Details of Conversation:  Evaluated patient at bedside, sister present. Patient continues to complain of dyspnea, wants to eat the hamburger his sister brought but unable due to high flow nasal cannula with 100% NRB over that. Spoke with  about possibly getting airvo ordered for patient so that he would be able to eat more easily. Plan for d/c later today to SNF. Physical Function:  PPS: 30    OBJECTIVE:   Prognosis: Poor    Physical Exam:  BP (!) 144/83   Pulse 103   Temp 98 °F (36.7 °C) (Temporal)   Resp 24   Ht 6' 1.5\" (1.867 m)   Wt 161 lb 14.4 oz (73.4 kg)   SpO2 94%   BMI 21.07 kg/m²   Constitutional:  Elderly, thin, awake, alert, appears dyspneic  Eyes: no scleral icterus, normal lids, no discharge  ENMT:  Normocephalic, atraumatic, mucosa moist, EOMI  Neck:  trachea midline, no JVD  Lungs: Dyspneic, increased work of breathing, tachypneic, rhonchi scattered  Heart[de-identified]  RRR, distant heart tones, no murmur, rub, or gallop noted during exam  Abd:  Soft, non tender, non distended, bowel sounds present  :  deferred  MSK: sarcopenia present  Ext:  Moving all extremities, no edema, pulses present  Skin:  Warm and dry, no rashes on visible skin  Psych: non-anxious affect  Neuro:  PERRL, Alert, grossly nonfocal; following commands    Objective data reviewed: labs, images, records, medication use, vitals and chart    Discussed patient and the plan of care with the other IDT members: Palliative Medicine IDT Team, Floor Nurse, Patient and Family    Time/Communication  Greater than 50% of time spent, total 25 minutes in counseling and coordination of care at the bedside regarding goals of care, symptom management, diagnosis and prognosis and see above. Thank you for allowing Palliative Medicine to participate in the care of Kierra Mccormick.

## 2021-02-25 NOTE — PROGRESS NOTES
Balance Sitting EOB:  Independent  Dynamic Standing:  SBA>Min A with no AD Sitting EOB: Min A  Dynamic Standing: NT Sitting EOB:  Independent  Dynamic Standing:  Mod Independent with AAD      Pt is A & O x 3, intermittent confusion with conversation  Sensation:  Pt denies numbness and tingling to extremities  Edema:  unremarkable    Vitals:  SPO2 91% on optiflow at 60L/min 60% FiO2  SPO2 85-87% after sitting 3 min EOB  SPO2 92% within 2 min of returning to semi-supine    Patient education  Pt educated on role of therapy, technique with bed mobility, sitting posture at EOB    Patient response to education:   Pt verbalized understanding Pt demonstrated skill Pt requires further education in this area   yes yes yes     ASSESSMENT:    Comments:  RN clears pt for therapy session this PM. Pt resting semi-supine upon arrival, agreeable to PT session. Pt requires manual assist for B LE and trunk negotiation to sit EOB. Pt maintains strong kyphotic posture with R lateral trunk lean sitting EOB. Pt tolerated approx 12 min sitting EOB with SPO2 maintained as noted above. Pt fatigued quickly and was assisted to return to semi-supine. He completed rolling to R and L sides to allow changing of linens. Pt with HOB elevated and all needs in reach at end of session with RN in room. Treatment:  Patient practiced and was instructed in the following treatment:    ? Bed mobility: cues for sequencing and hand placement to facilitate improved independence with transitions, manual assist to complete transfers as noted above  ? Therapeutic activities: cues for sitting posture at EOB and improved breathing technique, skilled monitoring of SPO2 response to all activity    PLAN:    Patient is making continued progress towards established goals. Will continue with current POC.         PLAN:    Time in  1352  Time out  1415    Total Treatment Time  23    CPT codes:  [] Gait training 06553 0 minutes  [] Manual therapy 57666 0 minutes [x] Therapeutic activities 15659 23 minutes  [] Therapeutic exercises 18030 0 minutes  [] Neuromuscular reeducation 47988 0 minutes      Steph Garber PT, DPT  [id number]

## 2021-02-25 NOTE — PROGRESS NOTES
OCCUPATIONAL THERAPY  Treatment Session    Date:2021  Patient Name: Jose Antonio Ferguson  MRN: [id number]  : 1940  Room: [address] [place], [place] [address]    Evaluating OT: JASON Field, OTR/L [id number]    AM-PAC Daily Activity Raw Score: 10/24  Recommended Adaptive Equipment: shower chair     Diagnosis: PE   Referring Practitioner: Wiley Tomlin MD  Surgery: n/a   Pertinent Medical History: AAA, Lung CA, HTN, memory impairment   Precautions:  Falls,  15L HF O2 optiflow, Contreras Catheter     Home Living: Pt lives alone in a 2 story home with step(s) to enter; bed/bath on main level   Bathroom setup: tub/shower unit   Equipment owned: none    Prior Level of Function: IND with ADLs/IADLs; using no AD for functional mobility   Driving: yes    Pain Level: 0/10 - denies pain    Cognition: A&O: 3/4; Follows 2 step directions, with Min Vcs and increased time   Memory:  fair -   Sequencing:  fair -   Problem solving:  fair -   Judgement/safety:  fair  -    Vitals: 15L HF NC  O2 sats in semi-supine prior to ax:  91%  O2 sats w/ bed-level ax:  85%  O2 sats in semi-supine at end of session:  92%    RN present at end of session - aware of vitals      Functional Assessment:   Initial Eval Status  Date: 21 Treatment Status  Date:  21 Short Term Goals  Treatment frequency: 2-3 x/week   Feeding SUP  Min A   IND   Grooming Min A (standing at sink for facial washing)  SBA;    Able to wash face with increased time. SUP   UB Dressing SUP  Max A; To complete lizzeth/doff gown with increased time. IND   LB Dressing Min A (pt able to use crossing of leg technique to doff/lizzeth B socks) Dep; To lizzeth/doff socks. SUP    Bathing Min A (simulated) Max A   SUP    Toileting Min A (while standing to void, with increased time) Dep A   SUP   Bed Mobility  Log roll: NT  Supine to sit: NT   Sit to supine: NT  Log roll:  Max A  Supine to sit: Mod A  Sit to supine: NT      Log roll IND  Supine to sit: IND   Sit to supine: IND Functional Transfers Sit to stand:SBA   Stand to sit:SBA  Commode: Min A (due to 1 LOB) NT   IND   Functional Mobility Min A (no AD, to/from bathroom, pt used furniture to hold onto, 1 LOB to commode) NT   SUP   Balance Sitting: SUP  Standing: Min A Min A at EOB for 12 minutes and verbal prompts to increase sitting balance. Activity Tolerance fair Poor(+)        Visual/  Perceptual Glasses: yes                Hand dominance: L  UE ROM: RUE:  WFL  LUE:  WFL  Strength: RUE: grossly 5/5 LUE: grossly 5/5   Strength: B WFL  Fine Motor Coordination:  WFL     Hearing: WFL  Sensation:  No c/o numbness/tingling   Tone:  WFL  Edema: BLEs                             Comments:  Cleared by RN to see pt. Upon arrival, patient found in semi-supine - agreeable to OT session. At end of session, patient was properly positioned in semi-supine. RN at b/s. Pt would benefit from continued OT to increase functional independence and quality of life. Treatment: Pt required vc's for proper technique/safety with hand placement/body mechanics/posture for bed mobility/ADLs. Pt required vc's for sequencing/initiation of ADLs/functional transfers. Pt required increased time to complete ADLs/functional transfers due to SOB/Hypoxia - rest breaks. 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 Pt appeared to have tolerated session fairly. Pt demo'd fair understanding of education provided. Continue to educate.      Time In: 1:50       Time Out: 2:15    Treatment Charges: Mins Units   Ther Ex  95608       Manual Therapy 42817       Thera Activities 96655 10  1   ADL/Home Mgt 78245 13 1   Neuro Re-ed 22518       Group Therapy        Orthotic manage/training  84512       Non-Billable Time       Total Timed Treatment 23 2     Clarita Coley R Austyn Cadet [address], [address]

## 2021-02-25 NOTE — PROGRESS NOTES
Karen Orona M.D.,[facility]  Jessy Hurd D.O., F.AAmbarC.OAmbarI., Makenzie Del Rio M.D. Svetlana Hopper M.D., Charlotte Hopper M.D. Jose Shankar D.O. Daily Pulmonary Progress Note    Patient:  Stefani Danielson 78 y.o. male MRN: [id number]     Date of Service: 2/25/2021      Synopsis     We are following patient for acute bilateral pulmonary embolism, acute on chronic respiratory failure, bilateral pleural effusions    \"CC\" shortness of breath    Code status: Limited      Subjective      Patient was seen and examined, today he is on 15L oxygen plus nonrebreather. He is de-saturating with eating or exertion. He will switch to AirVo. Review of Systems:  Constitutional: Denies fever, weight loss, night sweats, and fatigue  Skin: Denies pigmentation, dark lesions, and rashes   HEENT: Denies hearing loss, tinnitus, ear drainage, epistaxis, sore throat, and hoarseness. Cardiovascular: Denies palpitations, chest pain, and chest pressure. Respiratory: Denies cough, dyspnea at rest, hemoptysis, apnea, and choking.   Gastrointestinal: Denies nausea, vomiting, poor appetite, diarrhea, heartburn or reflux  Genitourinary: Denies dysuria, frequency, urgency or hematuria  Musculoskeletal: Denies myalgias, muscle weakness, and bone pain  Neurological: Denies dizziness, vertigo, headache, and focal weakness  Psychological: Denies anxiety and depression  Endocrine: Denies heat intolerance and cold intolerance  Hematopoietic/Lymphatic: Denies bleeding problems and blood transfusions    24-hour events:  none    Objective   Vitals: /84   Pulse 102   Temp 98 °F (36.7 °C) (Temporal)   Resp 24   Ht 6' 1.5\" (1.867 m)   Wt 161 lb 14.4 oz (73.4 kg)   SpO2 91% Comment: while eating 85%  BMI 21.07 kg/m²     I/O:    Intake/Output Summary (Last 24 hours) at 2/25/2021 1246  Last data filed at 2/25/2021 0645  Gross per 24 hour   Intake 300 ml   Output 1850 ml   Net -1550 ml       Vent Information Skin Assessment: Clean, dry, & intact  Vt Ordered: 534 mL  FiO2 : 60 %  SpO2: 91 %(while eating 85%)  SpO2/FiO2 ratio: 156.67  I Time/ I Time %: 0.9 s  Mask Type: Full face mask  Mask Size: (S) Medium       IPAP: (S) 16 cmH20  CPAP/EPAP: (S) 8 cmH2O     CURRENT MEDS :  Scheduled Meds:   docusate sodium  100 mg Oral BID    polyethylene glycol  17 g Oral Daily    apixaban  10 mg Oral BID    Followed by   Bernarda Rodrigo ON 3/1/2021] apixaban  5 mg Oral BID    bisacodyl  10 mg Rectal Q6H    sodium chloride flush  10 mL Intravenous 2 times per day    ipratropium-albuterol  1 ampule Inhalation Q4H While awake    guaiFENesin  400 mg Oral TID    sodium chloride flush  10 mL Intravenous 2 times per day    amLODIPine  5 mg Oral Daily    baclofen  20 mg Oral Nightly    vitamin D  2,000 Units Oral Daily    trospium  20 mg Oral BID AC    sodium chloride flush  10 mL Intravenous 2 times per day    budesonide  0.5 mg Nebulization BID    And    Arformoterol Tartrate  15 mcg Nebulization BID       Physical Exam:  General Appearance: appears comfortable in no acute distress. HEENT: Normocephalic atraumatic without obvious abnormality   Neck: Lips, mucosa, and tongue normal.  Supple, symmetrical, trachea midline, no adenopathy;thyroid:  no enlargement/tenderness/nodules or JVD. Lung: Breath sounds diminished with scattered rhonchi. Respirations   unlabored. Symmetrical expansion. Heart: RRR, normal S1, S2. No MRG  Abdomen: Soft, NT, ND. BS present x 4 quadrants. No bruit or organomegaly. Extremities: Pedal pulses 2+ symmetric b/l. Extremities normal, no cyanosis, clubbing, or edema. Musculokeletal: No joint swelling, no muscle tenderness. ROM normal in all joints of extremities. Neurologic: Mental status: Alert and Oriented X3 .     Pertinent/ New Labs and Imaging Studies     Imaging Personally Reviewed:  2/19/2021  Coarse interstitial markings in the lungs.  There are bilateral lower lung opacities and probable pleural effusions. Atherosclerotic disease in the thoracic aorta. Air-filled bowel loops in the upper abdomen. Enteric tube extends subdiaphragmatic with distal tip and side port   projecting over the left upper quadrant.  The distal tip projects towards the   upper and lateral aspect of the left upper quadrant. Worsening of bilateral lower lung airspace opacities.  Finding may represent   increase in pleural effusion/compressive atelectasis. ECHO  2/20  Summary   Left ventricle size is normal.   Normal left ventricular wall thickness. Overall ejection fraction is low normal .   No regional wall motion abnormalities seen. E/A flow reversal noted. Suggestive of diastolic dysfunction. Right ventricle global systolic function is mildly reduced . Right ventricular septum flattened in systole consistent with RV pressure   overload. Physiologic and/or trace mitral regurgitation is present. Moderate tricuspid regurgitation. RVSP is 61 mmHg. Pulmonary hypertension is moderate . cxr 2/23  Interval improvement of bibasilar infiltrates slightly worse on the left. Labs:  Lab Results   Component Value Date    WBC 5.0 02/25/2021    HGB 11.5 02/25/2021    HCT 39.4 02/25/2021    MCV 89.7 02/25/2021    MCH 26.2 02/25/2021    MCHC 29.2 02/25/2021    RDW 20.8 02/25/2021    PLT 52 02/25/2021    MPV NOT CALC 02/25/2021     Lab Results   Component Value Date     02/25/2021    K 4.5 02/25/2021    K 4.5 02/25/2021    CL 95 02/25/2021    CO2 44 02/25/2021    BUN 13 02/25/2021    CREATININE 0.8 02/25/2021    LABALBU 2.7 02/25/2021    CALCIUM 8.6 02/25/2021    GFRAA >60 02/25/2021    LABGLOM >60 02/25/2021     Lab Results   Component Value Date    PROTIME 12.0 04/04/2019    INR 1.1 04/04/2019     No results for input(s): PROBNP in the last 72 hours. No results for input(s): PROCAL in the last 72 hours. This SmartLink has not been configured with any valid records. Assessment:    1. Acute bilateral pulmonary embolism  2. Acute on chronic respiratory failure secondary to above  3. bilateral pleural effusions L>R with atelectasis/infiltrate  4. COPD with bullous emphysema  5. Non-small cell lung cancer  6. History of prostate cancer  7. History of tobacco use  8. Non-compliance with oxygen   9. Bacteremia  10. Staph hemolyticus and strep and blood cultures (likely contaminant)      Plan:   1. Follow ABGs  2. Limited code, meds only  3. Vancomycin per ID  4. Recommend Hospice, being considered by family  5. Pleural fluid negative for growth  6. Chronic oxygen 3L at home, currently on 15L HF plus NRB, will switch to AirVo  7. Eliquis for anticoagulation  8. Cxr, see above    OK to discharge to facility    This plan of care was reviewed in collaboration with Dr. Ulices Barr  Electronically signed by ANGEL Hwang CNP on 2/25/2021 at 12:46 PM    I personally saw, examined, and cared for the patient. Labs, medications, radiographs reviewed. I agree with history exam and plans detailed in NP note.         Electronically signed by Zechariah Vance DO on 2/25/2021 at 3:16 PM

## 2021-02-25 NOTE — PROGRESS NOTES
Neuro: grossly intact, moves all four extremities.    Capillary Refill: Brisk,< 3 seconds   Peripheral Pulses: +2 palpable, equal bilaterally            Labs:   Recent Labs     02/23/21 0442 02/24/21  0601 02/25/21  0600   WBC 4.6 4.0* 5.0   HGB 10.6* 11.3* 11.5*   HCT 35.0* 38.1 39.4   PLT 63* 60* 52*     Recent Labs     02/23/21 0442 02/24/21  0601 02/25/21  0600    141 139   K 3.9  3.9 4.0  4.0 4.5  4.5   CL 95* 96* 95*   CO2 41* 43* 44*   BUN 20 15 13   CREATININE 0.9 0.9 0.8   CALCIUM 8.3* 8.4* 8.6     Recent Labs     02/23/21 0442 02/24/21  0601 02/25/21  0600   * 91* 81*   * 168* 137*   BILITOT 0.6 0.7 0.7   ALKPHOS 74 70 73     No results for input(s): INR in the last 72 hours. No results for input(s): Julia McFall in the last 72 hours. Recent Labs     02/23/21 0442 02/24/21  0601 02/25/21  0600   * 91* 81*   * 168* 137*   BILITOT 0.6 0.7 0.7   ALKPHOS 74 70 73     No results for input(s): LACTA in the last 72 hours.   No results found for: Ori Night  No results found for: AMMONIA    Assessment:    Active Hospital Problems    Diagnosis Date Noted    Pulmonary embolism ([facility] [address] [I26.99] 02/16/2021   non small cell lung cancer   uti   AAa   s/p thoracentesis Feb 18     Plan:  * cont norvasc   cont eliqius           DVT Prophylaxis: **eliquis  Diet: DIET GENERAL;  Code Status: Limited     PT/OT Eval Status: *done     Dispo - *ANTOLIN        Electronically signed by Dean Perez DO on 2/25/2021 at 2:42 PM [facility]

## 2021-02-25 NOTE — PROGRESS NOTES
Date: 2/25/2021    Time: 8:30 AM    Patient Placed On BIPAP/CPAP/ Non-Invasive Ventilation? Yes    If no must comment. Facial area red/color change? No           If YES are Blister/Lesion present? No   If yes must notify nursing staff  BIPAP/CPAP skin barrier?   Yes    Skin barrier type:mepilexlite       Comments:        Aide Tilley

## 2021-02-25 NOTE — DISCHARGE INSTR - COC
Continuity of Care Form    Patient Name: Nancy Correa   :  1940  MRN:  [id number]    Admit date:  2021  Discharge date:  2021    Code Status Order: Limited   Advance Directives:      Admitting Physician:  Markus Curry DO  PCP: Gabby Pittman MD    Discharging Nurse: Carolina Ramos MSN, [facility] Unit/Room#: 8120/9359-W  Discharging Unit Phone Number: [phone]    Emergency Contact:   Extended Emergency Contact Information  Primary Emergency Contact: [address] [place] [address] Phone: [phone]  Relation: Brother/Sister    Past Surgical History:  Past Surgical History:   Procedure Laterality Date    COLONOSCOPY      COLONOSCOPY  2012    CYSTOSCOPY N/A 2020    CYSTOSCOPY RETROGRADE, BOTOX  INJECTION 100 UNITS performed by Nicole Ace MD at [address] OTHER SURGICAL HISTORY  13    cysto       Immunization History:   Immunization History   Administered Date(s) Administered    Influenza Vaccine, unspecified formulation 2018, 10/26/2018    Influenza, High Dose (Fluzone 65 yrs and older) 10/01/2015, 2016    Influenza, Quadv, IM, PF (6 mo and older Fluzone, Flulaval, Fluarix, and 3 yrs and older Afluria) 2020    Influenza, Quadv, adjuvanted, 65 yrs +, IM, PF (Fluad) 2020    Influenza, Triv, inactivated, subunit, adjuvanted, IM (Fluad 65 yrs and older) 2017, 2019    Pneumococcal Conjugate 13-valent ([id number]) 2015    Pneumococcal Polysaccharide (Cjwnhlngp08) 2012, 2017    Zoster Live (Zostavax) 2014       Active Problems:  Patient Active Problem List   Diagnosis Code    Mass of lingula of lung R91.8    COPD, group B, by GOLD 2017 classification ([address] J44.9    Hemothorax on left J94.2    HTN (hypertension) I10    Malignant neoplasm of upper lobe of left lung ([address] C34.12    CA of prostate ([address] C61  Benign prostatic hyperplasia N40.0    Abdominal aortic aneurysm (AAA) ([facility]) I71.4    Irradiation cystitis with hematuria N30.41    Memory impairment R41.3    Lung disease J98.4    Pulmonary embolism ([facility]) I26.99       Isolation/Infection:   Isolation            No Isolation          Patient Infection Status       Infection Onset Added Last Indicated Last Indicated By Review Planned Expiration Resolved Resolved By    None active    Resolved    COVID-19 Rule Out 02/16/21 02/16/21 02/16/21 COVID-19, Rapid (Ordered)   02/16/21 Rule-Out Test Resulted            Nurse Assessment:  Last Vital Signs: BP (!) 144/83   Pulse 103   Temp 98 °F (36.7 °C) (Temporal)   Resp 28   Ht 6' 1.5\" (1.867 m)   Wt 161 lb 14.4 oz (73.4 kg)   SpO2 98%   BMI 21.07 kg/m²     Last documented pain score (0-10 scale): Pain Level: 0  Last Weight:   Wt Readings from Last 1 Encounters:   02/24/21 161 lb 14.4 oz (73.4 kg)     Mental Status:  oriented, alert, coherent, logical, thought processes intact and able to concentrate and follow conversation    IV Access:  - None    Nursing Mobility/ADLs:  Walking   Dependent  Transfer  Dependent  Bathing  Assisted  Dressing  Dependent  Toileting  Dependent  Feeding  Assisted  Med Admin  Assisted  Med Delivery   whole     Elimination:  Continence:   · Bowel: Yes  · Bladder: Yes  Urinary Catheter: Removal Date 2-   Colostomy/Ileostomy/Ileal Conduit: No       Date of Last BM: 2/23/2021    Intake/Output Summary (Last 24 hours) at 2/25/2021 1036  Last data filed at 2/25/2021 0645  Gross per 24 hour   Intake 300 ml   Output 1850 ml   Net -1550 ml     I/O last 3 completed shifts: In: 5 [P.O.:420]  Out: [address] [Urine:1850]    Safety Concerns: At Risk for Falls    Impairments/Disabilities:      None    Nutrition Therapy:  Current Nutrition Therapy:   - Oral Diet:  General    Routes of Feeding: Oral  Liquids:  Thin Liquids  Daily Fluid Restriction: no Last Modified Barium Swallow with Video (Video Swallowing Test): not done    Treatments at the Time of Hospital Discharge:   Respiratory Treatments: none  Oxygen Therapy:  60 liters optiflow during the day and AVAPS at night    Rehab Therapies: Physical Therapy and Occupational Therapy  Weight Bearing Status/Restrictions: No weight bearing restirctions    Patient's personal belongings (please select all that are sent with patient):  Glasses, Dentures upper and lower    RN SIGNATURE:  Electronically signed by Glen Holloway RN on 2/25/21 at 3:36 PM EST    CASE MANAGEMENT/SOCIAL WORK SECTION    Inpatient Status Date: ***    Readmission Risk Assessment Score:  Readmission Risk              Risk of Unplanned Readmission:        20           Discharging to Facility/ Agency   · Name:   · Address:  · Phone:  · Fax:    Dialysis Facility (if applicable)   · Name:  · Address:  · Dialysis Schedule:  · Phone:  · Fax:    / signature: {Esignature:[id number]:::0}    PHYSICIAN SECTION    Prognosis: {Prognosis:5681867950:::0}    Condition at Discharge: [address] [place] [address] Patient Condition:830791753:::0}    Rehab Potential (if transferring to Rehab): {Prognosis:9721427623:::0}    Recommended Labs or Other Treatments After Discharge: ***    Physician Certification: I certify the above information and transfer of Apryl Cartwright  is necessary for the continuing treatment of the diagnosis listed and that he requires {Admit to Appropriate Level of Care:74519:::0} for {GREATER/LESS:187821554} 30 days.      Update Admission H&P: {CHP DME Changes in HandP:575362966:::0}    PHYSICIAN SIGNATURE:  Electronically signed by Ilene Cueto DO on 2/25/21 at 10:36 AM EST

## 2021-02-25 NOTE — PLAN OF CARE
Problem: Falls - Risk of:  Goal: Will remain free from falls  Description: Will remain free from falls  2/25/2021 0647 by Nancy Ramos RN  Outcome: Met This Shift  Goal: Absence of physical injury  Description: Absence of physical injury  2/25/2021 7488 by Nancy Ramos RN  Outcome: Met This Shift     Problem: Breathing Pattern - Ineffective:  Goal: Ability to achieve and maintain a regular respiratory rate will improve  Description: Ability to achieve and maintain a regular respiratory rate will improve  Outcome: Ongoing     Problem: Gas Exchange - Impaired:  Goal: Levels of oxygenation will improve  Description: Levels of oxygenation will improve  Outcome: Ongoing

## 2021-02-26 VITALS
DIASTOLIC BLOOD PRESSURE: 82 MMHG | BODY MASS INDEX: 20.78 KG/M2 | OXYGEN SATURATION: 92 % | TEMPERATURE: 98.3 F | RESPIRATION RATE: 22 BRPM | HEART RATE: 102 BPM | SYSTOLIC BLOOD PRESSURE: 138 MMHG | WEIGHT: 161.9 LBS | HEIGHT: 74 IN

## 2021-02-26 LAB
ALBUMIN SERPL-MCNC: 3.1 G/DL (ref 3.5–5.2)
ALP BLD-CCNC: 84 U/L (ref 40–129)
ALT SERPL-CCNC: 122 U/L (ref 0–40)
ANION GAP SERPL CALCULATED.3IONS-SCNC: -1 MMOL/L (ref 7–16)
ANISOCYTOSIS: ABNORMAL
AST SERPL-CCNC: 73 U/L (ref 0–39)
BASOPHILIC STIPPLING: ABNORMAL
BASOPHILS ABSOLUTE: 0 E9/L (ref 0–0.2)
BASOPHILS RELATIVE PERCENT: 1.2 % (ref 0–2)
BILIRUB SERPL-MCNC: 0.7 MG/DL (ref 0–1.2)
BUN BLDV-MCNC: 17 MG/DL (ref 8–23)
CALCIUM SERPL-MCNC: 8.5 MG/DL (ref 8.6–10.2)
CHLORIDE BLD-SCNC: 96 MMOL/L (ref 98–107)
CO2: 45 MMOL/L (ref 22–29)
CREAT SERPL-MCNC: 0.9 MG/DL (ref 0.7–1.2)
EOSINOPHILS ABSOLUTE: 0.05 E9/L (ref 0.05–0.5)
EOSINOPHILS RELATIVE PERCENT: 0.9 % (ref 0–6)
GFR AFRICAN AMERICAN: >60
GFR NON-AFRICAN AMERICAN: >60 ML/MIN/1.73
GLUCOSE BLD-MCNC: 99 MG/DL (ref 74–99)
HCT VFR BLD CALC: 41.7 % (ref 37–54)
HEMOGLOBIN: 12 G/DL (ref 12.5–16.5)
HYPOCHROMIA: ABNORMAL
LYMPHOCYTES ABSOLUTE: 0.35 E9/L (ref 1.5–4)
LYMPHOCYTES RELATIVE PERCENT: 6.1 % (ref 20–42)
MCH RBC QN AUTO: 26.1 PG (ref 26–35)
MCHC RBC AUTO-ENTMCNC: 28.8 % (ref 32–34.5)
MCV RBC AUTO: 90.8 FL (ref 80–99.9)
MONOCYTES ABSOLUTE: 0.47 E9/L (ref 0.1–0.95)
MONOCYTES RELATIVE PERCENT: 7.8 % (ref 2–12)
MYELOCYTE PERCENT: 0.9 % (ref 0–0)
NEUTROPHILS ABSOLUTE: 5.02 E9/L (ref 1.8–7.3)
NEUTROPHILS RELATIVE PERCENT: 84.3 % (ref 43–80)
OVALOCYTES: ABNORMAL
PDW BLD-RTO: 20.9 FL (ref 11.5–15)
PLATELET # BLD: 47 E9/L (ref 130–450)
PLATELET CONFIRMATION: NORMAL
PMV BLD AUTO: ABNORMAL FL (ref 7–12)
POIKILOCYTES: ABNORMAL
POLYCHROMASIA: ABNORMAL
POTASSIUM REFLEX MAGNESIUM: 4.8 MMOL/L (ref 3.5–5)
POTASSIUM SERPL-SCNC: 4.8 MMOL/L (ref 3.5–5)
RBC # BLD: 4.59 E12/L (ref 3.8–5.8)
SODIUM BLD-SCNC: 140 MMOL/L (ref 132–146)
TARGET CELLS: ABNORMAL
TOTAL PROTEIN: 7.1 G/DL (ref 6.4–8.3)
WBC # BLD: 5.9 E9/L (ref 4.5–11.5)

## 2021-02-26 PROCEDURE — 85025 COMPLETE CBC W/AUTO DIFF WBC: CPT

## 2021-02-26 PROCEDURE — 36415 COLL VENOUS BLD VENIPUNCTURE: CPT

## 2021-02-26 PROCEDURE — 6370000000 HC RX 637 (ALT 250 FOR IP): Performed by: FAMILY MEDICINE

## 2021-02-26 PROCEDURE — 2580000003 HC RX 258: Performed by: INTERNAL MEDICINE

## 2021-02-26 PROCEDURE — 6370000000 HC RX 637 (ALT 250 FOR IP): Performed by: STUDENT IN AN ORGANIZED HEALTH CARE EDUCATION/TRAINING PROGRAM

## 2021-02-26 PROCEDURE — 6370000000 HC RX 637 (ALT 250 FOR IP): Performed by: INTERNAL MEDICINE

## 2021-02-26 PROCEDURE — 2700000000 HC OXYGEN THERAPY PER DAY

## 2021-02-26 PROCEDURE — 94660 CPAP INITIATION&MGMT: CPT

## 2021-02-26 PROCEDURE — 80053 COMPREHEN METABOLIC PANEL: CPT

## 2021-02-26 PROCEDURE — 6360000002 HC RX W HCPCS: Performed by: FAMILY MEDICINE

## 2021-02-26 PROCEDURE — 94640 AIRWAY INHALATION TREATMENT: CPT

## 2021-02-26 RX ORDER — LACTULOSE 10 G/15ML
20 SOLUTION ORAL 2 TIMES DAILY
Refills: 1 | DISCHARGE
Start: 2021-02-26

## 2021-02-26 RX ADMIN — DOCUSATE SODIUM 100 MG: 100 CAPSULE, LIQUID FILLED ORAL at 10:10

## 2021-02-26 RX ADMIN — BUDESONIDE 500 MCG: 0.5 SUSPENSION RESPIRATORY (INHALATION) at 09:27

## 2021-02-26 RX ADMIN — LACTULOSE 20 G: 20 SOLUTION ORAL at 10:10

## 2021-02-26 RX ADMIN — Medication 2000 UNITS: at 10:09

## 2021-02-26 RX ADMIN — APIXABAN 10 MG: 5 TABLET, FILM COATED ORAL at 10:09

## 2021-02-26 RX ADMIN — IPRATROPIUM BROMIDE AND ALBUTEROL SULFATE 1 AMPULE: 2.5; .5 SOLUTION RESPIRATORY (INHALATION) at 12:39

## 2021-02-26 RX ADMIN — GUAIFENESIN 400 MG: 400 TABLET ORAL at 10:09

## 2021-02-26 RX ADMIN — Medication 10 ML: at 10:10

## 2021-02-26 RX ADMIN — TROSPIUM CHLORIDE 20 MG: 20 TABLET, FILM COATED ORAL at 10:09

## 2021-02-26 RX ADMIN — POLYETHYLENE GLYCOL 3350 17 G: 17 POWDER, FOR SOLUTION ORAL at 10:10

## 2021-02-26 RX ADMIN — AMLODIPINE BESYLATE 5 MG: 5 TABLET ORAL at 10:09

## 2021-02-26 RX ADMIN — IPRATROPIUM BROMIDE AND ALBUTEROL SULFATE 1 AMPULE: 2.5; .5 SOLUTION RESPIRATORY (INHALATION) at 09:27

## 2021-02-26 RX ADMIN — ARFORMOTEROL TARTRATE 15 MCG: 15 SOLUTION RESPIRATORY (INHALATION) at 09:27

## 2021-02-26 ASSESSMENT — PAIN SCALES - GENERAL: PAINLEVEL_OUTOF10: 0

## 2021-02-26 NOTE — PROGRESS NOTES
Hospitalist Progress Note      PCP: Rae Howell MD    Date of Admission: 2/16/2021    Chief Complaint: *SOB     Hospital Course: **found to have a PE, he has a known history of non small cell lung cancer   *vanc dc'd awaiting ANTOLIN placement  today**    subjective: ** dry mouth      Medications:  Reviewed    Infusion Medications   Scheduled Medications    lactulose  20 g Oral BID    docusate sodium  100 mg Oral BID    polyethylene glycol  17 g Oral Daily    apixaban  10 mg Oral BID    Followed by   Nicole Sheth ON 3/1/2021] apixaban  5 mg Oral BID    sodium chloride flush  10 mL Intravenous 2 times per day    ipratropium-albuterol  1 ampule Inhalation Q4H While awake    guaiFENesin  400 mg Oral TID    sodium chloride flush  10 mL Intravenous 2 times per day    amLODIPine  5 mg Oral Daily    baclofen  20 mg Oral Nightly    vitamin D  2,000 Units Oral Daily    trospium  20 mg Oral BID AC    sodium chloride flush  10 mL Intravenous 2 times per day    budesonide  0.5 mg Nebulization BID    And    Arformoterol Tartrate  15 mcg Nebulization BID     PRN Meds: morphine 20MG/ML, sodium chloride flush, perflutren lipid microspheres, sodium chloride flush, albuterol, sodium chloride flush, promethazine **OR** ondansetron      Intake/Output Summary (Last 24 hours) at 2/26/2021 1601  Last data filed at 2/26/2021 1234  Gross per 24 hour   Intake 300 ml   Output 1900 ml   Net -1600 ml       Exam:    /82   Pulse 102   Temp 98.3 °F (36.8 °C) (Temporal)   Resp 22   Ht 6' 1.5\" (1.867 m)   Wt 161 lb 14.4 oz (73.4 kg)   SpO2 92%   BMI 21.07 kg/m²       Gen: *well developed  HEENT: NC/AT, moist mucous membranes,   Neck: supple, trachea midline,   Heart:  Normal s1/s2, RRR, no murmurs,  Lungs:  *cta * bilaterally, *  Abd: bowel sounds present, soft, nontender,   Extrem:  No clubbing, cyanosis,  *no* edema  Skin: no rashes or lesions  Psych: A & O x3  Neuro: grossly intact, moves all four extremities.

## 2021-02-26 NOTE — PROGRESS NOTES
Received a call from [facility] regarding patient's transfer to facility. Patient was scheduled for discharge to [facility] from [facility] at 9 PM. Staff at [facility] had concerns that patient was placed on 60L of Heated High Flow Oxygen earlier today when he was previously on 30L (15L NRB and 15L HFNC). [facility] staff stated they do not have respiratory in the building tonight and would feel safer pushing back the transfer to tomorrow morning (2/26) when respiratory is available.  notified and will address this tomorrow morning to reschedule transportation.

## 2021-02-26 NOTE — CARE COORDINATION
[facility] refused discharge last night, they did not feel comfortable with patient coming to building without their RT present. Rescheduled trip for today at 1300 and notified facility. Ambulance on soft chart. For questions I can be reached at [phone].  Shyam Chandler

## 2021-02-26 NOTE — PROGRESS NOTES
Palliative Care Department  [phone]  Palliative Care Progress Note  Provider [facility]  [id number]  Hospital Day: 11  Date of Initial Consult: 2/20/2021  Referring Provider: Abdon Sanabria MD  Palliative Medicine was consulted for assistance with: goals of care, code status    HPI:   Sony Jackson is a 78 y.o. with a medical history of NSCLC s/p radiation, HTN, COPD on 3L at home who was admitted on 2/16/2021 from home with a CHIEF COMPLAINT of altered mental status, SOB. ASSESSMENT/PLAN:     Pertinent Hospital Diagnoses     ? NSCLC  ? Bilateral pulmonary emboli  ? Community acquired pneumonia  ? UTI  ? Lactic acidosis  ? Acute on chronic hypoxic respiratory failure  ? CKD stage 3      Palliative Care Encounter / Counseling Regarding Goals of Care  Please see detailed goals of care discussion as below  ? At this time, Sony Jackson, Does have capacity for medical decision-making. Capacity is time limited and situation/question specific  ? During encounter sister was assistant medical decision-maker  ? Outcome of goals of care meeting: patient wants hospice, can't afford private pay and doesn't have help at home (lives alone), so going skilled and will transition to hospice later  ? Code status Limited no to everything but medication  ? Advanced Directives: no POA or living will in [facility]  ?  Surrogate/Legal NOK:  Lorena Espinoza (sister) [phone]      Dyspnea:   -  Secondary to NSCLC   -  Will start prn oral morphine for dyspnea 5mg q4hrs    Spiritual assessment: no spiritual distress identified  Bereavement and grief: to be determined  Referrals to: none today  SUBJECTIVE:     Current medical issues leading to Palliative Medicine involvement include   Active Hospital Problems    Diagnosis Date Noted    Pulmonary embolism ([facility] [I26.99] 02/16/2021 Details of Conversation:  Evaluated patient at bedside, he is now on airvo at [facility]. Able to speak in longer phrases before stopping to catch his breath. Hasn't eaten, states he just isn't hungry. Per SW  at 1300 today so RT will be in the building on arrival to get him set up. Physical Function:  PPS: 30    OBJECTIVE:   Prognosis: Poor    Physical Exam:  /76   Pulse 107   Temp 98.5 °F (36.9 °C) (Temporal)   Resp 28   Ht 6' 1.5\" (1.867 m)   Wt 161 lb 14.4 oz (73.4 kg)   SpO2 90%   BMI 21.07 kg/m²   Constitutional:  Elderly, thin, awake, alert, appears dyspneic  Eyes: no scleral icterus, normal lids, no discharge  ENMT:  Normocephalic, atraumatic, mucosa moist, EOMI  Neck:  trachea midline, no JVD  Lungs: Dyspneic, increased work of breathing, tachypneic, few rhonchi  Heart[de-identified]  RRR, distant heart tones, no murmur, rub, or gallop noted during exam  Abd:  Soft, non tender, non distended, bowel sounds present  :  deferred  MSK: sarcopenia present  Ext:  Moving all extremities, no edema, pulses present  Skin:  Warm and dry, no rashes on visible skin  Psych: non-anxious affect  Neuro:  PERRL, Alert, grossly nonfocal; following commands    Objective data reviewed: labs, images, records, medication use, vitals and chart    Discussed patient and the plan of care with the other IDT members: Palliative Medicine IDT Team, Floor Nurse, Patient and Family    Time/Communication  Greater than 50% of time spent, total 5 minutes in counseling and coordination of care at the bedside regarding goals of care, symptom management, diagnosis and prognosis and see above. Thank you for allowing Palliative Medicine to participate in the care of Tamiko Mosquera

## 2021-02-27 NOTE — DISCHARGE SUMMARY
BUN 17 02/26/2021    CREATININE 0.9 02/26/2021    CALCIUM 8.5 02/26/2021    ALKPHOS 84 02/26/2021     02/26/2021    AST 73 02/26/2021    BILITOT 0.7 02/26/2021    LABALBU 3.1 02/26/2021     Lab Results   Component Value Date    INR 1.1 04/04/2019    INR 1.1 04/17/2013       Radiology:  XR CHEST PORTABLE   Final Result   Increased interstitial pulmonary edema or pneumonia in the lung bases notable   on the right. XR CHEST PORTABLE   Final Result   Interval improvement of bibasilar infiltrates slightly worse on the left. XR ABDOMEN FOR NG/OG/NE TUBE PLACEMENT   Final Result   Satisfactory position of enteric tube. CT ABDOMEN PELVIS WO CONTRAST Additional Contrast? None   Final Result   Significantly distended stomach with fluid and food particles. Decompression   is recommended. Distended transverse colon with collapsed and somewhat thickened left   hemicolon. Ileus or pseudo-obstruction is favored. Mechanical obstruction   due to mass is less likely. Consider colonoscopy. Patchy infiltrates and pleural effusion lung bases likely pneumonia or edema. Pulmonary nodule in the lingula concerning for malignancy. RECOMMENDATIONS:   Abdominal aortic aneurysm measuring 4.2 x 4.8 cm. Surveillance in 6 months   and vascular surgical assessment are recommended. XR ABDOMEN (KUB) (SINGLE AP VIEW)   Final Result   Nonspecific bowel gas pattern. No evidence of bowel obstruction. XR CHEST PORTABLE   Final Result   Worsening of bilateral lower lung airspace opacities. Finding may represent   increase in pleural effusion/compressive atelectasis. XR CHEST PORTABLE   Final Result   1. Patchy bilateral perihilar and lower lobe infiltrates. 2. Cardiomegaly. 3. Gross pleural effusions are not appreciated on the portable chest x-ray. US THORACENTESIS Which side should the procedure be performed?  Left   Final Result   Ultrasound-guided left chest marking for thoracentesis US DUP LOWER EXTREMITIES BILATERAL VENOUS   Final Result   Within the visualized vessels there is no evidence for deep venous   thrombosis      US ABDOMEN COMPLETE   Final Result   1. Mild hydronephrosis on the left. Please correlate with patient's lab   values. 2.  Complicated cystic structure measuring 2.1 cm in the lower pole of the   right kidney. (Follow-up renal ultrasound in 3-6 months suggested to reassess.)   3. Small amount of free fluid in the left upper quadrant. 4.  Remainder of the study is as above. CT ABDOMEN PELVIS W IV CONTRAST Additional Contrast? None   Final Result   Constipation with mild nonspecific thickening of the colonic wall. Clinical   assessment is recommended. Mild thickening of the urinary bladder. Cystitis is considered. Abdominal aortic aneurysm measuring 4.1 x 5 cm. RECOMMENDATIONS:   Abdominal aortic aneurysm. 6 month surveillance and vascular surgical   consult is recommended. CTA PULMONARY W CONTRAST   Final Result   Constipation with mild nonspecific thickening of the colonic wall. Clinical   assessment is recommended. Mild thickening of the urinary bladder. Cystitis is considered. Abdominal aortic aneurysm measuring 4.1 x 5 cm. RECOMMENDATIONS:   Abdominal aortic aneurysm. 6 month surveillance and vascular surgical   consult is recommended. CT HEAD WO CONTRAST   Final Result   1. No acute intracranial abnormality. 2. Near complete opacification of the partially visualized right maxillary   sinus. XR CHEST PORTABLE   Final Result   Advanced COPD with patchy bibasilar infiltrates concerning for pneumonia. Poor visualization of the previously noted nodule in the lingula.           Discharge Medications:   Discharge Medication List as of 2/26/2021 11:21 AM      START taking these medications    Details Provider Follow-up:    pmd    Condition at Discharge:  Stable    The patient was seen and examined on day of discharge and this discharge summary is in conjunction with any daily progress note from day of discharge. Time Spent on discharge is 45 minutes  in the examination, evaluation, counseling and review of medications and discharge plan. Signed:    JACK Santos   2/27/2021      Thank you Bebo Hurtado MD for the opportunity to be involved in this patient's care.  If you have any questions or concerns please feel free to contact me at [phone]

## 2021-03-19 ENCOUNTER — TELEPHONE (OUTPATIENT)
Dept: RADIATION ONCOLOGY | Age: 81
End: 2021-03-19

## 2021-08-17 ENCOUNTER — HOSPITAL ENCOUNTER (EMERGENCY)
Age: 81
Discharge: HOME | End: 2021-08-17
Attending: EMERGENCY MEDICINE
Payer: MEDICARE

## 2021-08-17 ENCOUNTER — APPOINTMENT (OUTPATIENT)
Dept: GENERAL RADIOLOGY | Age: 81
End: 2021-08-17
Payer: MEDICARE

## 2021-08-17 ENCOUNTER — APPOINTMENT (OUTPATIENT)
Dept: CT IMAGING | Age: 81
End: 2021-08-17
Payer: MEDICARE

## 2021-08-17 VITALS
BODY MASS INDEX: 21.34 KG/M2 | HEART RATE: 74 BPM | RESPIRATION RATE: 12 BRPM | DIASTOLIC BLOOD PRESSURE: 50 MMHG | TEMPERATURE: 97.3 F | WEIGHT: 161 LBS | OXYGEN SATURATION: 99 % | HEIGHT: 73 IN | SYSTOLIC BLOOD PRESSURE: 80 MMHG

## 2021-08-17 DIAGNOSIS — I95.9 HYPOTENSION, UNSPECIFIED HYPOTENSION TYPE: Primary | ICD-10-CM

## 2021-08-17 LAB
ANION GAP SERPL CALCULATED.3IONS-SCNC: 10 MMOL/L (ref 7–16)
ANISOCYTOSIS: ABNORMAL
BASOPHILS ABSOLUTE: 0 E9/L (ref 0–0.2)
BASOPHILS RELATIVE PERCENT: 0.2 % (ref 0–2)
BUN BLDV-MCNC: 78 MG/DL (ref 6–23)
BURR CELLS: ABNORMAL
CALCIUM SERPL-MCNC: 11.5 MG/DL (ref 8.6–10.2)
CHLORIDE BLD-SCNC: 102 MMOL/L (ref 98–107)
CO2: 29 MMOL/L (ref 22–29)
CREAT SERPL-MCNC: 2 MG/DL (ref 0.7–1.2)
EKG ATRIAL RATE: 82 BPM
EKG P AXIS: 76 DEGREES
EKG P-R INTERVAL: 162 MS
EKG Q-T INTERVAL: 358 MS
EKG QRS DURATION: 82 MS
EKG QTC CALCULATION (BAZETT): 418 MS
EKG R AXIS: 78 DEGREES
EKG T AXIS: 71 DEGREES
EKG VENTRICULAR RATE: 82 BPM
EOSINOPHILS ABSOLUTE: 0 E9/L (ref 0.05–0.5)
EOSINOPHILS RELATIVE PERCENT: 0.3 % (ref 0–6)
GFR AFRICAN AMERICAN: 39
GFR NON-AFRICAN AMERICAN: 39 ML/MIN/1.73
GLUCOSE BLD-MCNC: 71 MG/DL (ref 74–99)
HCT VFR BLD CALC: 37 % (ref 37–54)
HEMOGLOBIN: 11.8 G/DL (ref 12.5–16.5)
HYPOCHROMIA: ABNORMAL
LACTIC ACID, SEPSIS: 1.5 MMOL/L (ref 0.5–1.9)
LYMPHOCYTES ABSOLUTE: 0 E9/L (ref 1.5–4)
LYMPHOCYTES RELATIVE PERCENT: 0.8 % (ref 20–42)
MCH RBC QN AUTO: 25.9 PG (ref 26–35)
MCHC RBC AUTO-ENTMCNC: 31.9 % (ref 32–34.5)
MCV RBC AUTO: 81.1 FL (ref 80–99.9)
MONOCYTES ABSOLUTE: 0.26 E9/L (ref 0.1–0.95)
MONOCYTES RELATIVE PERCENT: 1.7 % (ref 2–12)
NEUTROPHILS ABSOLUTE: 12.54 E9/L (ref 1.8–7.3)
NEUTROPHILS RELATIVE PERCENT: 98.3 % (ref 43–80)
OVALOCYTES: ABNORMAL
PDW BLD-RTO: 21 FL (ref 11.5–15)
PLATELET # BLD: 102 E9/L (ref 130–450)
PMV BLD AUTO: ABNORMAL FL (ref 7–12)
POIKILOCYTES: ABNORMAL
POLYCHROMASIA: ABNORMAL
POTASSIUM REFLEX MAGNESIUM: 4.2 MMOL/L (ref 3.5–5)
RBC # BLD: 4.56 E12/L (ref 3.8–5.8)
SCHISTOCYTES: ABNORMAL
SODIUM BLD-SCNC: 141 MMOL/L (ref 132–146)
TARGET CELLS: ABNORMAL
WBC # BLD: 12.8 E9/L (ref 4.5–11.5)

## 2021-08-17 PROCEDURE — 70450 CT HEAD/BRAIN W/O DYE: CPT

## 2021-08-17 PROCEDURE — 71045 X-RAY EXAM CHEST 1 VIEW: CPT

## 2021-08-17 PROCEDURE — 96374 THER/PROPH/DIAG INJ IV PUSH: CPT

## 2021-08-17 PROCEDURE — 2580000003 HC RX 258: Performed by: INTERNAL MEDICINE

## 2021-08-17 PROCEDURE — 85025 COMPLETE CBC W/AUTO DIFF WBC: CPT

## 2021-08-17 PROCEDURE — 2500000003 HC RX 250 WO HCPCS: Performed by: INTERNAL MEDICINE

## 2021-08-17 PROCEDURE — 93010 ELECTROCARDIOGRAM REPORT: CPT | Performed by: INTERNAL MEDICINE

## 2021-08-17 PROCEDURE — 93005 ELECTROCARDIOGRAM TRACING: CPT | Performed by: INTERNAL MEDICINE

## 2021-08-17 PROCEDURE — 80048 BASIC METABOLIC PNL TOTAL CA: CPT

## 2021-08-17 PROCEDURE — 83605 ASSAY OF LACTIC ACID: CPT

## 2021-08-17 PROCEDURE — 6360000002 HC RX W HCPCS: Performed by: EMERGENCY MEDICINE

## 2021-08-17 PROCEDURE — 99283 EMERGENCY DEPT VISIT LOW MDM: CPT

## 2021-08-17 RX ORDER — LORAZEPAM 2 MG/ML
1 INJECTION INTRAMUSCULAR ONCE
Status: COMPLETED | OUTPATIENT
Start: 2021-08-17 | End: 2021-08-17

## 2021-08-17 RX ORDER — DEXTROSE MONOHYDRATE 25 G/50ML
12.5 INJECTION, SOLUTION INTRAVENOUS ONCE
Status: COMPLETED | OUTPATIENT
Start: 2021-08-17 | End: 2021-08-17

## 2021-08-17 RX ORDER — DEXTROSE MONOHYDRATE 25 G/50ML
25 INJECTION, SOLUTION INTRAVENOUS PRN
Status: DISCONTINUED | OUTPATIENT
Start: 2021-08-17 | End: 2021-08-17

## 2021-08-17 RX ORDER — 0.9 % SODIUM CHLORIDE 0.9 %
1000 INTRAVENOUS SOLUTION INTRAVENOUS ONCE
Status: COMPLETED | OUTPATIENT
Start: 2021-08-17 | End: 2021-08-17

## 2021-08-17 RX ADMIN — LORAZEPAM 1 MG: 2 INJECTION INTRAMUSCULAR; INTRAVENOUS at 12:43

## 2021-08-17 RX ADMIN — DEXTROSE MONOHYDRATE 12.5 G: 25 INJECTION, SOLUTION INTRAVENOUS at 12:57

## 2021-08-17 RX ADMIN — SODIUM CHLORIDE 1000 ML: 9 INJECTION, SOLUTION INTRAVENOUS at 11:40

## 2021-08-17 NOTE — ED PROVIDER NOTES
HPI:  8/17/21, Time: 11:32 AM EDT         Barrett Mota is a 78 y.o. male with PMHx of prostate and lung cancer s/p radiation who comes in from nursing facility presenting to the ED for altered mental status, beginning 5 days ago. The complaint has been persistent, moderate in severity, and worsened by nothing. As per the sister, patient was [facility]) but fell 5 days prior at the nursing facility. The family saw him 1 day prior to this encounter. He has been nonverbal since Friday. (baseline: verbal, answers questions, remembers relatives). Review of Systems   Unable to perform ROS: Acuity of condition          --------------------------------------------- PAST HISTORY ---------------------------------------------  Past Medical History:  has a past medical history of Cancer ([facility] History of radiation therapy, History of radiation therapy, Hypertension, Lung disease, Malignant neoplasm of lingula of left lung ([address] Memory impairment, NSCLC of upper lobe ([address] and Other accident. Past Surgical History:  has a past surgical history that includes Colonoscopy (2003); other surgical history (1999); Colonoscopy (05/01/2012); other surgical history (4/17/13); and Cystoscopy (N/A, 1/9/2020). Social History:  reports that he quit smoking about 2 years ago. His smoking use included cigarettes. He started smoking about 62 years ago. He has a 45.00 pack-year smoking history. He has never used smokeless tobacco. He reports that he does not drink alcohol and does not use drugs. Family History: family history includes Cancer in his father; Cancer (age of onset: 61) in his maternal aunt; Heart Failure in his mother. The patients home medications have been reviewed.     Allergies: Codeine    -------------------------------------------------- RESULTS -------------------------------------------------  All laboratory and radiology results have been personally reviewed by myself LABS:  Results for orders placed or performed during the hospital encounter of 08/17/21   CBC Auto Differential   Result Value Ref Range    WBC 12.8 (H) 4.5 - 11.5 E9/L    RBC 4.56 3.80 - 5.80 E12/L    Hemoglobin 11.8 (L) 12.5 - 16.5 g/dL    Hematocrit 37.0 37.0 - 54.0 %    MCV 81.1 80.0 - 99.9 fL    MCH 25.9 (L) 26.0 - 35.0 pg    MCHC 31.9 (L) 32.0 - 34.5 %    RDW 21.0 (H) 11.5 - 15.0 fL    Platelets 619 (L) 112 - 450 E9/L    MPV NOT CALC 7.0 - 12.0 fL    Neutrophils % 98.3 (H) 43.0 - 80.0 %    Lymphocytes % 0.8 (L) 20.0 - 42.0 %    Monocytes % 1.7 (L) 2.0 - 12.0 %    Eosinophils % 0.3 0.0 - 6.0 %    Basophils % 0.2 0.0 - 2.0 %    Neutrophils Absolute 12.54 (H) 1.80 - 7.30 E9/L    Lymphocytes Absolute 0.00 (L) 1.50 - 4.00 E9/L    Monocytes Absolute 0.26 0.10 - 0.95 E9/L    Eosinophils Absolute 0.00 (L) 0.05 - 0.50 E9/L    Basophils Absolute 0.00 0.00 - 0.20 E9/L    Anisocytosis 2+     Polychromasia 2+     Hypochromia 2+     Poikilocytes 2+     Schistocytes 1+     Bigler Cells 2+     Ovalocytes 2+     Target Cells 1+    Basic Metabolic Panel w/ Reflex to MG   Result Value Ref Range    Sodium 141 132 - 146 mmol/L    Potassium reflex Magnesium 4.2 3.5 - 5.0 mmol/L    Chloride 102 98 - 107 mmol/L    CO2 29 22 - 29 mmol/L    Anion Gap 10 7 - 16 mmol/L    Glucose 71 (L) 74 - 99 mg/dL    BUN 78 (H) 6 - 23 mg/dL    CREATININE 2.0 (H) 0.7 - 1.2 mg/dL    GFR Non-African American 39 >=60 mL/min/1.73    GFR African American 39     Calcium 11.5 (H) 8.6 - 10.2 mg/dL   Lactate, Sepsis   Result Value Ref Range    Lactic Acid, Sepsis 1.5 0.5 - 1.9 mmol/L   EKG 12 Lead   Result Value Ref Range    Ventricular Rate 82 BPM    Atrial Rate 82 BPM    P-R Interval 162 ms    QRS Duration 82 ms    Q-T Interval 358 ms    QTc Calculation (Bazett) 418 ms    P Axis 76 degrees    R Axis 78 degrees    T Axis 71 degrees       RADIOLOGY:  Interpreted by Radiologist.  CT Head WO Contrast   Final Result   1. There is no acute intracranial abnormality. Specifically, there is no   intracranial hemorrhage. 2. Atrophy and periventricular leukomalacia,         XR CHEST PORTABLE   Final Result   No acute pulmonary process. ------------------------- NURSING NOTES AND VITALS REVIEWED ---------------------------   The nursing notes within the ED encounter and vital signs as below have been reviewed. BP (!) 80/50   Pulse 74   Temp 97.3 °F (36.3 °C)   Resp 12   Ht 6' 1\" (1.854 m)   Wt 161 lb (73 kg)   SpO2 99%   BMI 21.24 kg/m²   Oxygen Saturation Interpretation: Normal      ---------------------------------------------------PHYSICAL EXAM--------------------------------------          Physical Exam  Constitutional:       Appearance: He is ill-appearing. HENT:      Head: Normocephalic and atraumatic. Eyes:      Pupils: Pupils are equal, round, and reactive to light. Comments: Pupils constricted 2mm bilaterally   Cardiovascular:      Rate and Rhythm: Normal rate and regular rhythm. Pulses: Normal pulses. Heart sounds: Normal heart sounds. Pulmonary:      Effort: Pulmonary effort is normal.      Breath sounds: Normal breath sounds. Abdominal:      General: Abdomen is flat. Bowel sounds are normal.   Skin:     General: Skin is dry. Capillary Refill: Capillary refill takes more than 3 seconds. Comments: Decreased skin turgor,    Neurological:      Comments: Nonverbal, withdraws to pain          ------------------------------ ED COURSE/MEDICAL DECISION MAKING----------------------  Medications   0.9 % sodium chloride bolus (0 mLs Intravenous Stopped 21 1252)   LORazepam (ATIVAN) injection 1 mg (1 mg Intravenous Given 21 1243)   dextrose 50 % IV solution (12.5 g Intravenous Given 21 1257)         ED COURSE:       Medical Decision Makin yo male with PMHx of cancer on Hospice , comes in with altered mental status after fall. Hypotensive, emaciated, nonverbal on exam. 1.5L cc fluid given.  Initial concern for sepsis vs head injury. CT head negative for head injury. Leucocytosis, hypoglycemia, CRISTEL stage 2 noted on exam. Family would like to continue [facility]. Hospice consulted. Family would like to continue with hospice. Counseling: The emergency provider has spoken with the family member patient and sister and discussed todays results, in addition to providing specific details for the plan of care and counseling regarding the diagnosis and prognosis. Questions are answered at this time and they are agreeable with the plan.      --------------------------------- IMPRESSION AND DISPOSITION ---------------------------------    IMPRESSION  1.  Hypotension, unspecified hypotension type        DISPOSITION  Disposition: Discharge to nursing home  Patient condition is poor             Hemanth Iraheta MD  Resident  08/18/21 2753

## 2021-08-17 NOTE — CARE COORDINATION
Social Work/ Discharge Planning:    Pt presents to the ED secondary to failure to thrive. Pt has hx of lung cancer and prostate cancer and is on oxygen. Pt is from [address] [place] [address]. GÓMEZ noted hospice consult. SW met with pt and and sister/ primary decision maker, Brooklynn Rema, and discuss hospice care for pt. Pt's sister in agreement, freedom of choice provided and pt's sister would like referral to [facility]. GÓMEZ made referral to RAMÓN Vaughan from [facility] in ED. Discussion had with pt's sister and plan will be for pt to return to [facility] with [facility] via [facility]. ETA 5pm.  Charge RN aware. GÓMEZ informed by pt's sister that the  at [facility] had already briefly discussed hospice care for pt and they would use [facility]. Umu Sampson from [facility], reports there is already an order for hospice at the facility and they will be calling [facility] once pt returns.

## 2021-08-17 NOTE — PROGRESS NOTES
[facility]     Liaison Information Visit Note              Patient Name: Aggie Darnell   :  1940  MRN:  [id number]  Admit date:  2021   Hospital Admitting Physician:  No admitting provider for patient encounter. PCP:  Tracy Lr MD  Primary Insurance: Payor: Nj Parker /  /  /    Emergency Contact:      Contact/Relation:   /         Phone:     Advance Directive  Patient has completed an advance directive   Discussed with: Family member  DPOA-HC Name-Relation:    Phone:     Terminal Diagnosis met prostate cancer as confirmed by Dr. 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 Hospice care is a service that is covered by most insurance plans. The following levels of hospice care were discussed including, routine level of hospice care at private home or facility, which patient/family is responsible for any room and board fees at the facility, and general in patient level of care (GIP) at the [facility] for short term symptom management.   Per Medicare guidelines, a patient is considered appropriate for GIP if they have uncontrolled symptoms such as pain, agitation,

## 2023-07-25 NOTE — CARE COORDINATION
Met with patient at bedside to discuss transition of care. He lives alone. No assistive devices. He tells me he and his sister check on each other. Independent with ADL's. He has home O2 through [address] [facility]. Call placed to liason to see if he has a nebulizer or Cpap, he was unsure. He stated he was driving pta. He is planning to return home at discharge with no anticipated needs. IV Zithromax and Rocephin continue. Awaiting therapy evals to determine any home going needs.  CM will continue to follow    Lilian GOELN, RN  [facility] Case Management  [phone] [JVD] : no jugular venous distention  [Normal Breath Sounds] : Normal breath sounds [Normal Rate and Rhythm] : normal rate and rhythm [2+] : left 2+ [0] : left 0 [Ankle Swelling (On Exam)] : not present [Varicose Veins Of Lower Extremities] : not present [de-identified] : well appearing in wheelchair [de-identified] : wnl; catheter is place  [FreeTextEntry1] : warm and well perfused; <2s cap refill

## (undated) DEVICE — CYSTO PACK: Brand: MEDLINE INDUSTRIES, INC.

## (undated) DEVICE — CATHETER URET OLV TIP 5FRX70CM FLEXIMA

## (undated) DEVICE — CAMERA STRYKER 1488 HD GEN

## (undated) DEVICE — SPECIMEN CUP W/LID: Brand: DEROYAL

## (undated) DEVICE — SOLUTION IV IRRIG WATER 1000ML POUR BRL 2F7114

## (undated) DEVICE — READY WET SKIN SCRUB TRAY-LF: Brand: MEDLINE INDUSTRIES, INC.

## (undated) DEVICE — CATHETER URET 5FR L70CM TIP 8FR OPN END CONE TIP INJ HUB

## (undated) DEVICE — Z INACTIVE USE 2635503 SOLUTION IRRIG 3000ML ST H2O USP UROMATIC PLAS CONT

## (undated) DEVICE — DISPOSABLE NEEDLE: Brand: DISPOSABLE NEEDLE

## (undated) DEVICE — CATHETER URETH 14FR L16IN RED RUB RND HLLW TIP W/ TWO EYE

## (undated) DEVICE — INJECTOR: Brand: INJECTOR

## (undated) DEVICE — GLOVE SURG SZ 75 STD WHT LTX SYN POLYMER BEAD REINF ANTI RL

## (undated) DEVICE — GOWN,SIRUS,FABRNF,XL,20/CS: Brand: MEDLINE